# Patient Record
Sex: FEMALE | Race: WHITE | NOT HISPANIC OR LATINO | Employment: FULL TIME | URBAN - METROPOLITAN AREA
[De-identification: names, ages, dates, MRNs, and addresses within clinical notes are randomized per-mention and may not be internally consistent; named-entity substitution may affect disease eponyms.]

---

## 2023-05-16 ENCOUNTER — TRANSFERRED RECORDS (OUTPATIENT)
Dept: HEALTH INFORMATION MANAGEMENT | Facility: CLINIC | Age: 38
End: 2023-05-16
Payer: OTHER GOVERNMENT

## 2023-05-18 ENCOUNTER — MEDICAL CORRESPONDENCE (OUTPATIENT)
Dept: HEALTH INFORMATION MANAGEMENT | Facility: CLINIC | Age: 38
End: 2023-05-18
Payer: OTHER GOVERNMENT

## 2023-05-22 ENCOUNTER — TRANSCRIBE ORDERS (OUTPATIENT)
Dept: OTHER | Age: 38
End: 2023-05-22

## 2023-05-22 DIAGNOSIS — C02.9 MALIGNANT NEOPLASM OF TONGUE (H): Primary | ICD-10-CM

## 2023-05-22 NOTE — TELEPHONE ENCOUNTER
FUTURE VISIT INFORMATION:      FUTURE VISIT INFORMATION:    Date: 5/31/23    Time: 3 PM    [place] [facility] [place] INFORMATION:    Referring provider: Dr. 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 Ruby Huggins at [facility]    RECORDS REQUESTED FROM:       Clinic name Comments Records Status Imaging Status   [facility] *req for recs 5/22/23 5/16/23 OV with -  Dr. Ruby Huggins Pending    Send to scan 5/22    [facility]  MRN: [id number] 5/12/23 + 5/5/23 OV - Niall Montilla-Al Westfall MD  CE    [facility]  [address] [place], [place] [address]  [place] [address]  Phone [phone]  Fax [phone] 5/12/23 Case: [id number] Specimen: Tongue Bx    5/5/23 Case: [id number] Specimen: Tongue Bx    fedex track: [id number] CE    Path   req 5/22                         May 22, 2023 at 2:13 PM - STAT req for recs @ [facility] and send req to [facility] for path [facility]  * UPDATE 6 PM - Received OV note from [facility] and send to scanning -[place]

## 2023-05-23 DIAGNOSIS — C02.9 PRIMARY SQUAMOUS CELL CARCINOMA OF TONGUE (H): Primary | ICD-10-CM

## 2023-05-24 ENCOUNTER — ANCILLARY PROCEDURE (OUTPATIENT)
Dept: CT IMAGING | Facility: CLINIC | Age: 38
End: 2023-05-24
Attending: OTOLARYNGOLOGY
Payer: OTHER GOVERNMENT

## 2023-05-24 DIAGNOSIS — C02.9 PRIMARY SQUAMOUS CELL CARCINOMA OF TONGUE (H): ICD-10-CM

## 2023-05-24 PROCEDURE — 70491 CT SOFT TISSUE NECK W/DYE: CPT | Mod: TC | Performed by: RADIOLOGY

## 2023-05-24 RX ORDER — IOPAMIDOL 755 MG/ML
500 INJECTION, SOLUTION INTRAVASCULAR ONCE
Status: COMPLETED | OUTPATIENT
Start: 2023-05-24 | End: 2023-05-24

## 2023-05-24 RX ADMIN — IOPAMIDOL 100 ML: 755 INJECTION, SOLUTION INTRAVASCULAR at 15:04

## 2023-05-24 RX ADMIN — Medication 50 ML: at 15:04

## 2023-05-24 NOTE — TELEPHONE ENCOUNTER
Action May 24, 2023 3:23 PM Deepa Mason Taken Slides from Briseida received and taken to 5th floor path lab for review.

## 2023-05-25 ENCOUNTER — LAB REQUISITION (OUTPATIENT)
Dept: LAB | Facility: CLINIC | Age: 38
End: 2023-05-25
Payer: OTHER GOVERNMENT

## 2023-05-25 PROCEDURE — 88321 CONSLTJ&REPRT SLD PREP ELSWR: CPT | Performed by: STUDENT IN AN ORGANIZED HEALTH CARE EDUCATION/TRAINING PROGRAM

## 2023-05-30 LAB
PATH REPORT.COMMENTS IMP SPEC: ABNORMAL
PATH REPORT.COMMENTS IMP SPEC: ABNORMAL
PATH REPORT.COMMENTS IMP SPEC: YES
PATH REPORT.FINAL DX SPEC: ABNORMAL
PATH REPORT.GROSS SPEC: ABNORMAL
PATH REPORT.MICROSCOPIC SPEC OTHER STN: ABNORMAL
PATH REPORT.RELEVANT HX SPEC: ABNORMAL
PATH REPORT.RELEVANT HX SPEC: ABNORMAL
PATH REPORT.SITE OF ORIGIN SPEC: ABNORMAL

## 2023-05-31 ENCOUNTER — PRE VISIT (OUTPATIENT)
Dept: OTOLARYNGOLOGY | Facility: CLINIC | Age: 38
End: 2023-05-31

## 2023-05-31 ENCOUNTER — THERAPY VISIT (OUTPATIENT)
Dept: SPEECH THERAPY | Facility: CLINIC | Age: 38
End: 2023-05-31
Payer: OTHER GOVERNMENT

## 2023-05-31 ENCOUNTER — PREP FOR PROCEDURE (OUTPATIENT)
Dept: OTOLARYNGOLOGY | Facility: CLINIC | Age: 38
End: 2023-05-31

## 2023-05-31 ENCOUNTER — OFFICE VISIT (OUTPATIENT)
Dept: OTOLARYNGOLOGY | Facility: CLINIC | Age: 38
End: 2023-05-31
Payer: OTHER GOVERNMENT

## 2023-05-31 VITALS
HEART RATE: 52 BPM | OXYGEN SATURATION: 98 % | DIASTOLIC BLOOD PRESSURE: 71 MMHG | TEMPERATURE: 97.5 F | WEIGHT: 229 LBS | SYSTOLIC BLOOD PRESSURE: 128 MMHG

## 2023-05-31 DIAGNOSIS — C02.9 SQUAMOUS CELL CANCER OF TONGUE (H): Primary | ICD-10-CM

## 2023-05-31 DIAGNOSIS — C02.9 PRIMARY SQUAMOUS CELL CARCINOMA OF TONGUE (H): Primary | ICD-10-CM

## 2023-05-31 DIAGNOSIS — R13.11 ORAL PHASE DYSPHAGIA: Primary | ICD-10-CM

## 2023-05-31 PROCEDURE — 99204 OFFICE O/P NEW MOD 45 MIN: CPT | Mod: GC | Performed by: OTOLARYNGOLOGY

## 2023-05-31 PROCEDURE — 99207 PR NO CHARGE LOS: CPT | Performed by: SPEECH-LANGUAGE PATHOLOGIST

## 2023-05-31 RX ORDER — FAMOTIDINE 20 MG/1
TABLET, FILM COATED ORAL
COMMUNITY
Start: 2023-02-20 | End: 2024-08-21

## 2023-05-31 ASSESSMENT — PAIN SCALES - GENERAL: PAINLEVEL: MILD PAIN (2)

## 2023-05-31 NOTE — PROGRESS NOTES
Kayagerard Martolabi was seen for allied health care provider visit for introduction of self and SLP services. Pt with lingual tongue SCC and will undergo partial glossectomy. Provided information on trajectory of care and benefits of SLP services after surgery once medically cleared to participate in PO trials. Formal swallow evaluation indicated post once once medically cleared. All questions answered within scope of practice for pt. Encouraged pt to reach out with any questions or concerns. Time spent with patient 5 minutes.       MALLIKA Brasher MA (music), CCC-SLP   Speech Language Pathologist  XAVIER Trained Vocologist   [facility]  Dept. of Otolaryngology  Department of Rehabilitation Services  [address] [place] [id number]  Email: [email or web address]   Phone: [phone]  Pronouns: she/her/hers

## 2023-05-31 NOTE — NURSING NOTE
Teaching Flowsheet - ENT   Relevant Diagnosis: scc tongue  Teaching Topic: partial glossectomy, neck dissection   Person(s) involved in teaching: patient        Motivation Level:  Asks Questions:   Yes  Eager to Learn:   Yes  Cooperative:   Yes  Receptive (willing/able to accept information):   Yes  Comments: Reviewed pre-op H and P,  NPO prior to  surgery,  pre-op scrub (given Hibiclens)  Reviewed post-op  cares , activity and pain.     Patient demonstrates understanding of the following:  Reason for the appointment, diagnosis and treatment plan:   Yes  Knowledge of proper use of medications and conditions for which they are ordered (with special attention to potential side effects or drug interactions):  stop aspirin products 1 week before surgery Yes  Which situations necessitate calling provider and whom to contact:   Yes  Nutritional needs and diet plan:   Yes  Pain management techniques:   Yes  Patient instructed on hand hygiene:  Yes  How and/when to access community resources:   Yes     Infection Prevention:  Patient   demonstrates understanding of the following:  Surgical procedure site care taught Yes  Signs and symptoms of infection taught Yes  Wound care taught Yes  Instructional Materials Used/Given: pre- op booklet,verbal  Instruction.    Shivani Montalvo, RN, BSN

## 2023-05-31 NOTE — LETTER
2023       RE: Kaya Wagner  [address] [facility] [address]     Dear Colleague,    Thank you for referring your patient, Kaya Wagner, to the [facility] EAR NOSE AND [facility]ROAT CLINIC [place] at [facility]. Please see a copy of my visit note below.    Dear Dr. Huggins:    I had the pleasure of meeting Kaya Wagner in consultation today at the [facility]roat [facility] at your request.     History of Present Illness:   Ms. Wagner is a 37-year-old female who presents for evaluation of a right tongue squamous cell carcinoma.  She first noticed a sore on her right lateral tongue when she was 3 months pregnant, approximately 6 months ago.  She was observed and this lesion was monitored for her pregnancy however it did not resolve.  She notes that it has not grown much however she is noticed some pain that is bothersome at times.  She was then referred to [facility] who performed 2 biopsies of the lesion the initial being fibroma and the second being positive for invasive squamous cell carcinoma.  She was referred to Dr. Huggins however due to insurance limitations she was referred to the [place].  She has not noticed any neck lumps or bumps or any tongue movement abnormalities.  She does feel her speech is somewhat affected due to pain in her tongue.  Of note she has had numerous canker sores in this area in the past however she has had no other symptoms.  She is a non-smoker rarely drinks alcohol.    PMH: Prediabetes, depression, anxiety, PCOS  PSH:  section  Family history: No family history of head or neck cancer    MEDICATIONS:     Current Outpatient Medications   Medication Sig Dispense Refill    famotidine (PEPCID) 20 MG tablet       metFORMIN (GLUCOPHAGE) 1000 MG tablet       sertraline (ZOLOFT) 50 MG tablet Take 50 mg by mouth daily         ALLERGIES:  No Known Allergies    HABITS/SOCIAL HISTORY:  Non-smoker    PAST MEDICAL HISTORY: No past medical history on file.     FAMILY HISTORY:  No family history on file.    REVIEW OF SYSTEMS:  12 point ROS was negative other than the symptoms noted above in the HPI.    PHYSICAL EXAMINATION:   General: laying in bed, no acute distress  /71   Pulse 52   Temp 97.5  F (36.4  C)   Wt 103.9 kg (229 lb)   SpO2 98%   HEAD: normocephalic, atraumatic  Face: symmetrical, no swelling, edema, or erythema, no facial droop  Eyes: eomi, perrl, clear sclera  Ears: no tragal tenderness, external ear canal open and clear bilaterally  Nose: no anterior drainage, intact and midline septum without perforation or hematoma   Mouth: moist, right posterior lateral tongue with roughly 1.5 cm shallow ulceration that is tender to palpation, no jaw or tooth tenderness, tongue midline and symmetric.  This does not appear to extend into the posterior oropharynx.  Oropharynx: tonsils within normal limits, uvula midline, no oropharyngeal erythema  Neck: no LAD, trach midline  Neuro: cranial nerves 2-12 grossly intact    CT neck:FINDINGS: Evaluation of the oral cavity and floor of mouth is limited  by metallic artifact from the patient's dental amalgam. There is a  questionable soft tissue density mass obscuring the right  glossotonsillar sulcus measuring roughly 1.9 x 2.0 cm in size that  could represent the tongue lesion in question. Biopsy note describes a  lesion of the right posterolateral tongue.     There are no definite enlarged lymph nodes in the neck on either side.  Scattered mildly prominent suprahyoid lymph nodes on both sides of the  neck are noted but are morphologically within normal limits in  contour.     No evidence for mucosal space lesion other than the aforementioned  right posterolateral tongue lesion. The salivary glands are  unremarkable. The thyroid gland is within normal limits. The lung  apices are clear. No sinusitis or mastoiditis.                                                                       IMPRESSION:  1. Abnormally prominent soft tissue obscuring the glossotonsillar  sulcus on the right measuring roughly 1.9 x 2.0 cm in size. This may  correspond to the biopsied tongue lesion.  2. No other mucosal space abnormalities.  3. No definite cervical lymphadenopathy.  4. Otherwise, normal soft tissue neck CT.         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  We discussed surgical management of this which would involve a partial glossectomy as well as likely management of the lymph nodes involving a right neck dissection.  She notes that she has a tattoo on her right lateral neck that is very important to her that she would like to avoid if possible in the surgery.  As this is a rather shallow lesion she will not require free flap reconstruction.  We will also place a temporary NG tube during surgery.  She agrees with this plan.  She also have a PET scan and tumor board presentation prior to her surgery.    The patient was seen and discussed with Dr. London    Thank you very much for the opportunity to participate in the care of your patient.      Chani Reynoso MD  Otolaryngology- Head & Neck Surgery      I, Jabier London MD, saw this patient with the resident/fellow and agree with the resident's findings and plan of care as documented in the resident's/fellow's note. I was present with the patient for the entire viewing portion of the endoscopy procedure (including scope insertion and withdrawal) and agree with the interpretation and report as documented by the resident.        Again, thank you for allowing me to participate in the care of your patient.      Sincerely,    Jabier London MD

## 2023-05-31 NOTE — PATIENT INSTRUCTIONS
1. We will call you to schedule date for surgery   2. Please schedule a pre-op physical with their primary MD within 30 days of the procedure.   3. Please avoid blood thinning medications 1 week prior to surgery (Ibuprofen, Aleve, Aspirin, etc.)   4. Please review contents within the surgical packet & use the antiseptic scrub as directed.   5. Please call FROYLAN Parrish with any further questions [phone]

## 2023-05-31 NOTE — LETTER
May 31, 2023      Kaya Wagner  [address] [facility] [address]        To Whom It May Concern:    Kaya Wagner was seen in our clinic today. She will be scheduled for surgery within the next 30 days and will require assistance and care from her  following surgery.     Please do not hesitate to contact our clinic for additional information regarding this request or if additional paperwork is requested.       Sincerely,        Jabier London MD

## 2023-05-31 NOTE — PROGRESS NOTES
Dear Dr. Huggins:    I had the pleasure of meeting Kaya Wagner in consultation today at the [facility] [place] at your request.     History of Present Illness:   Ms. Wagner is a 37-year-old female who presents for evaluation of a right tongue squamous cell carcinoma.  She first noticed a sore on her right lateral tongue when she was 3 months pregnant, approximately 6 months ago.  She was observed and this lesion was monitored for her pregnancy however it did not resolve.  She notes that it has not grown much however she is noticed some pain that is bothersome at times.  She was then referred to [facility] who performed 2 biopsies of the lesion the initial being fibroma and the second being positive for invasive squamous cell carcinoma.  She was referred to Dr. Huggins however due to insurance limitations she was referred to the [place].  She has not noticed any neck lumps or bumps or any tongue movement abnormalities.  She does feel her speech is somewhat affected due to pain in her tongue.  Of note she has had numerous canker sores in this area in the past however she has had no other symptoms.  She is a non-smoker rarely drinks alcohol.    PMH: Prediabetes, depression, anxiety, PCOS  PSH:  section  Family history: No family history of head or neck cancer    MEDICATIONS:     Current Outpatient Medications   Medication Sig Dispense Refill     famotidine (PEPCID) 20 MG tablet        metFORMIN (GLUCOPHAGE) 1000 MG tablet        sertraline (ZOLOFT) 50 MG tablet Take 50 mg by mouth daily         ALLERGIES:  No Known Allergies    HABITS/SOCIAL HISTORY: Non-smoker    PAST MEDICAL HISTORY: No past medical history on file.     FAMILY HISTORY:  No family history on file.    REVIEW OF SYSTEMS:  12 point ROS was negative other than the symptoms noted above in the HPI.    PHYSICAL EXAMINATION:   General: laying in bed, no acute distress  /71   Pulse 52   Temp 97.5  F  (36.4  C)   Wt 103.9 kg (229 lb)   SpO2 98%   HEAD: normocephalic, atraumatic  Face: symmetrical, no swelling, edema, or erythema, no facial droop  Eyes: eomi, perrl, clear sclera  Ears: no tragal tenderness, external ear canal open and clear bilaterally  Nose: no anterior drainage, intact and midline septum without perforation or hematoma   Mouth: moist, right posterior lateral tongue with roughly 1.5 cm shallow ulceration that is tender to palpation, no jaw or tooth tenderness, tongue midline and symmetric.  This does not appear to extend into the posterior oropharynx.  Oropharynx: tonsils within normal limits, uvula midline, no oropharyngeal erythema  Neck: no LAD, trach midline  Neuro: cranial nerves 2-12 grossly intact    CT neck:FINDINGS: Evaluation of the oral cavity and floor of mouth is limited  by metallic artifact from the patient's dental amalgam. There is a  questionable soft tissue density mass obscuring the right  glossotonsillar sulcus measuring roughly 1.9 x 2.0 cm in size that  could represent the tongue lesion in question. Biopsy note describes a  lesion of the right posterolateral tongue.     There are no definite enlarged lymph nodes in the neck on either side.  Scattered mildly prominent suprahyoid lymph nodes on both sides of the  neck are noted but are morphologically within normal limits in  contour.     No evidence for mucosal space lesion other than the aforementioned  right posterolateral tongue lesion. The salivary glands are  unremarkable. The thyroid gland is within normal limits. The lung  apices are clear. No sinusitis or mastoiditis.                                                                      IMPRESSION:  1. Abnormally prominent soft tissue obscuring the glossotonsillar  sulcus on the right measuring roughly 1.9 x 2.0 cm in size. This may  correspond to the biopsied tongue lesion.  2. No other mucosal space abnormalities.  3. No definite cervical lymphadenopathy.  4.  Otherwise, normal soft tissue neck CT.         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  We discussed surgical management of this which would involve a partial glossectomy as well as likely management of the lymph nodes involving a right neck dissection.  She notes that she has a tattoo on her right lateral neck that is very important to her that she would like to avoid if possible in the surgery.  As this is a rather shallow lesion she will not require free flap reconstruction.  We will also place a temporary NG tube during surgery.  She agrees with this plan.  She also have a PET scan and tumor board presentation prior to her surgery.    The patient was seen and discussed with Dr. London    Thank you very much for the opportunity to participate in the care of your patient.      Chani Reynoso MD  Otolaryngology- Head & Neck Surgery      IJabier MD, saw this patient with the resident/fellow and agree with the resident's findings and plan of care as documented in the resident's/fellow's note. I was present with the patient for the entire viewing portion of the endoscopy procedure (including scope insertion and withdrawal) and agree with the interpretation and report as documented by the resident.

## 2023-06-01 ENCOUNTER — TELEPHONE (OUTPATIENT)
Dept: OTOLARYNGOLOGY | Facility: CLINIC | Age: 38
End: 2023-06-01
Payer: OTHER GOVERNMENT

## 2023-06-01 NOTE — TELEPHONE ENCOUNTER
Called patient to schedule surgery with Dr. London    Date of Surgery: 6/8    Location of surgery: [place] [facility]    Pre-Op H&P: PCP Storm Fitzpatrick MD    Pre/Post Imaging:  Not Applicable    Post-Op Appt Date: 1 week    Surgery Packet Mailed: chantel    Additional comments: MERNA Garcia on 6/1/2023 at 1:36 PM

## 2023-06-02 ENCOUNTER — TUMOR CONFERENCE (OUTPATIENT)
Dept: ONCOLOGY | Facility: CLINIC | Age: 38
End: 2023-06-02
Payer: OTHER GOVERNMENT

## 2023-06-02 NOTE — TUMOR CONFERENCE
Head & Neck Tumor Conference Note        Status: New  Staff: Dr. London    Tumor Site: Right lateral tongue   Tumor Pathology: SCC   Tumor Stage: T1 v T2 N0  Tumor Treatment:  n/a    Reason for Review: Review imaging, path, and POC    Brief History: Ms. Wagner is a 37-year-old female who presents for evaluation of a right tongue squamous cell carcinoma.  She first noticed a sore on her right lateral tongue when she was 3 months pregnant, approximately 6 months ago.  She was observed and this lesion was monitored for her pregnancy however it did not resolve.  She notes that it has not grown much however she is noticed some pain that is bothersome at times.  She was then referred to [facility] who performed 2 biopsies of the lesion the initial being fibroma and the second being positive for invasive squamous cell carcinoma.  She was referred to Dr. Huggins however due to insurance limitations she was referred to the [place].  She has not noticed any neck lumps or bumps or any tongue movement abnormalities.  She does feel her speech is somewhat affected due to pain in her tongue.  Of note she has had numerous canker sores in this area in the past however she has had no other symptoms.  She is a non-smoker rarely drinks alcohol.     PMH: Prediabetes, depression, anxiety, PCOS  PSH:  section  Family history: No family history of head or neck cancer    Pertinent PMH: No past medical history on file.     Smoking Hx:   Social History     Tobacco Use     Smoking status: Never     Smokeless tobacco: Never     Imaging:   CT Neck   FINDINGS: Evaluation of the oral cavity and floor of mouth is limited  by metallic artifact from the patient's dental amalgam. There is a  questionable soft tissue density mass obscuring the right  glossotonsillar sulcus measuring roughly 1.9 x 2.0 cm in size that  could represent the tongue lesion in question. Biopsy note describes a  lesion of the right posterolateral  tongue.     There are no definite enlarged lymph nodes in the neck on either side.  Scattered mildly prominent suprahyoid lymph nodes on both sides of the  neck are noted but are morphologically within normal limits in  contour.     No evidence for mucosal space lesion other than the aforementioned  right posterolateral tongue lesion. The salivary glands are  unremarkable. The thyroid gland is within normal limits. The lung  apices are clear. No sinusitis or mastoiditis.                                                                      IMPRESSION:  1. Abnormally prominent soft tissue obscuring the glossotonsillar  sulcus on the right measuring roughly 1.9 x 2.0 cm in size. This may  correspond to the biopsied tongue lesion.  2. No other mucosal space abnormalities.  3. No definite cervical lymphadenopathy.  4. Otherwise, normal soft tissue neck CT.        Radiation dose for this scan was reduced using automated exposure  control, adjustment of the mA and/or kV according to patient size, or  iterative reconstruction technique.     DEVIN MORGAN MD     Pathology:   ***    Tumor Board Recommendation:   Discussion: ***    Plan:   PET Scan  Partial glossectomy, R MRND     Chani Reynoso MD PGY-3  Otolaryngology- Head and Neck Surgery    Documentation / Disclaimer Cancer Tumor Board Note  Cancer tumor board recommendations do not override what is determined to be reasonable care and treatment, which is dependent on the circumstances of a patient's case; the patient's medical, social, and personal concerns; and the clinical judgment of the oncologist [physician].

## 2023-06-02 NOTE — TUMOR CONFERENCE
Called and spoke with patient. She will proceed with PET scan as scheduled on Monday and surgery as scheduled on 6/8 with Dr. London. All questions answered and patient encouraged to call with further questions or concerns.       Shivani Montalvo, RN, BSN

## 2023-06-04 ENCOUNTER — HEALTH MAINTENANCE LETTER (OUTPATIENT)
Age: 38
End: 2023-06-04

## 2023-06-05 ENCOUNTER — HOSPITAL ENCOUNTER (OUTPATIENT)
Dept: PET IMAGING | Facility: CLINIC | Age: 38
Discharge: HOME | End: 2023-06-05
Attending: OTOLARYNGOLOGY | Admitting: OTOLARYNGOLOGY
Payer: OTHER GOVERNMENT

## 2023-06-05 DIAGNOSIS — C02.9 PRIMARY SQUAMOUS CELL CARCINOMA OF TONGUE (H): ICD-10-CM

## 2023-06-05 PROCEDURE — 74177 CT ABD & PELVIS W/CONTRAST: CPT | Mod: 26 | Performed by: RADIOLOGY

## 2023-06-05 PROCEDURE — 71260 CT THORAX DX C+: CPT | Mod: 26 | Performed by: RADIOLOGY

## 2023-06-05 PROCEDURE — 78815 PET IMAGE W/CT SKULL-THIGH: CPT | Mod: PI

## 2023-06-05 PROCEDURE — 74177 CT ABD & PELVIS W/CONTRAST: CPT

## 2023-06-05 PROCEDURE — 250N000011 HC RX IP 250 OP 636: Performed by: OTOLARYNGOLOGY

## 2023-06-05 PROCEDURE — 78815 PET IMAGE W/CT SKULL-THIGH: CPT | Mod: 26 | Performed by: RADIOLOGY

## 2023-06-05 PROCEDURE — 343N000001 HC RX 343: Performed by: OTOLARYNGOLOGY

## 2023-06-05 PROCEDURE — A9552 F18 FDG: HCPCS | Performed by: OTOLARYNGOLOGY

## 2023-06-05 RX ORDER — IOPAMIDOL 755 MG/ML
10-135 INJECTION, SOLUTION INTRAVASCULAR ONCE
Status: COMPLETED | OUTPATIENT
Start: 2023-06-05 | End: 2023-06-05

## 2023-06-05 RX ADMIN — IOPAMIDOL 121 ML: 755 INJECTION, SOLUTION INTRAVENOUS at 14:54

## 2023-06-05 RX ADMIN — FLUDEOXYGLUCOSE F-18 13.69 MILLICURIE: 500 INJECTION, SOLUTION INTRAVENOUS at 13:54

## 2023-06-07 ENCOUNTER — ANESTHESIA EVENT (OUTPATIENT)
Dept: SURGERY | Facility: CLINIC | Age: 38
DRG: 141 | End: 2023-06-07
Payer: OTHER GOVERNMENT

## 2023-06-07 ENCOUNTER — PATIENT OUTREACH (OUTPATIENT)
Dept: OTOLARYNGOLOGY | Facility: CLINIC | Age: 38
End: 2023-06-07
Payer: OTHER GOVERNMENT

## 2023-06-07 NOTE — TELEPHONE ENCOUNTER
Called and spoke with patient regarding the following PET scan results:    IMPRESSION:   1. Hypermetabolic lesion within the right posterior lateral tongue  extending to retromolar trigone, biopsy-proven squamous cell  carcinoma.     2. No cervical lymphadenopathy.     3. No evidence for distant metastatic disease.      Reviewed with patient no evidence of any metastatic disease on PET scan and plan to proceed as scheduled tomorrow with surgery ( right partial glossectomy and neck dissection). All questions answered and patient encouraged to call with further questions or concerns.       Shivani Montalvo, RN, BSN

## 2023-06-08 ENCOUNTER — APPOINTMENT (OUTPATIENT)
Dept: GENERAL RADIOLOGY | Facility: CLINIC | Age: 38
DRG: 141 | End: 2023-06-08
Attending: OTOLARYNGOLOGY
Payer: OTHER GOVERNMENT

## 2023-06-08 ENCOUNTER — ANESTHESIA (OUTPATIENT)
Dept: SURGERY | Facility: CLINIC | Age: 38
DRG: 141 | End: 2023-06-08
Payer: OTHER GOVERNMENT

## 2023-06-08 ENCOUNTER — HOSPITAL ENCOUNTER (INPATIENT)
Facility: CLINIC | Age: 38
LOS: 2 days | Discharge: HOME | DRG: 141 | End: 2023-06-10
Attending: OTOLARYNGOLOGY | Admitting: OTOLARYNGOLOGY
Payer: OTHER GOVERNMENT

## 2023-06-08 DIAGNOSIS — C06.9 SQUAMOUS CELL CARCINOMA OF ORAL CAVITY (H): Primary | ICD-10-CM

## 2023-06-08 LAB
ABO/RH(D): NORMAL
ANTIBODY SCREEN: NEGATIVE
GLUCOSE BLDC GLUCOMTR-MCNC: 114 MG/DL (ref 70–99)
GLUCOSE BLDC GLUCOMTR-MCNC: 134 MG/DL (ref 70–99)
GLUCOSE BLDC GLUCOMTR-MCNC: 140 MG/DL (ref 70–99)
SARS-COV-2 RNA RESP QL NAA+PROBE: NEGATIVE
SPECIMEN EXPIRATION DATE: NORMAL

## 2023-06-08 PROCEDURE — 250N000011 HC RX IP 250 OP 636: Performed by: STUDENT IN AN ORGANIZED HEALTH CARE EDUCATION/TRAINING PROGRAM

## 2023-06-08 PROCEDURE — 258N000003 HC RX IP 258 OP 636

## 2023-06-08 PROCEDURE — 120N000002 HC R&B MED SURG/OB UMMC

## 2023-06-08 PROCEDURE — 74018 RADEX ABDOMEN 1 VIEW: CPT | Mod: 26 | Performed by: RADIOLOGY

## 2023-06-08 PROCEDURE — 258N000003 HC RX IP 258 OP 636: Performed by: ANESTHESIOLOGY

## 2023-06-08 PROCEDURE — 250N000009 HC RX 250: Performed by: OTOLARYNGOLOGY

## 2023-06-08 PROCEDURE — 87635 SARS-COV-2 COVID-19 AMP PRB: CPT | Performed by: STUDENT IN AN ORGANIZED HEALTH CARE EDUCATION/TRAINING PROGRAM

## 2023-06-08 PROCEDURE — 999N000065 XR ABDOMEN PORT 1 VIEW

## 2023-06-08 PROCEDURE — 88332 PATH CONSLTJ SURG EA ADD BLK: CPT | Mod: 26 | Performed by: STUDENT IN AN ORGANIZED HEALTH CARE EDUCATION/TRAINING PROGRAM

## 2023-06-08 PROCEDURE — 360N000077 HC SURGERY LEVEL 4, PER MIN: Performed by: OTOLARYNGOLOGY

## 2023-06-08 PROCEDURE — 250N000011 HC RX IP 250 OP 636: Performed by: PHYSICIAN ASSISTANT

## 2023-06-08 PROCEDURE — 258N000003 HC RX IP 258 OP 636: Performed by: OTOLARYNGOLOGY

## 2023-06-08 PROCEDURE — 272N000001 HC OR GENERAL SUPPLY STERILE: Performed by: OTOLARYNGOLOGY

## 2023-06-08 PROCEDURE — 250N000013 HC RX MED GY IP 250 OP 250 PS 637: Performed by: OTOLARYNGOLOGY

## 2023-06-08 PROCEDURE — 258N000003 HC RX IP 258 OP 636: Performed by: STUDENT IN AN ORGANIZED HEALTH CARE EDUCATION/TRAINING PROGRAM

## 2023-06-08 PROCEDURE — 250N000011 HC RX IP 250 OP 636

## 2023-06-08 PROCEDURE — 250N000013 HC RX MED GY IP 250 OP 250 PS 637

## 2023-06-08 PROCEDURE — 250N000013 HC RX MED GY IP 250 OP 250 PS 637: Performed by: STUDENT IN AN ORGANIZED HEALTH CARE EDUCATION/TRAINING PROGRAM

## 2023-06-08 PROCEDURE — 250N000009 HC RX 250: Performed by: ANESTHESIOLOGY

## 2023-06-08 PROCEDURE — 370N000017 HC ANESTHESIA TECHNICAL FEE, PER MIN: Performed by: OTOLARYNGOLOGY

## 2023-06-08 PROCEDURE — 250N000011 HC RX IP 250 OP 636: Performed by: OTOLARYNGOLOGY

## 2023-06-08 PROCEDURE — 0CB7XZZ EXCISION OF TONGUE, EXTERNAL APPROACH: ICD-10-PCS | Performed by: OTOLARYNGOLOGY

## 2023-06-08 PROCEDURE — 88305 TISSUE EXAM BY PATHOLOGIST: CPT | Mod: 26 | Performed by: STUDENT IN AN ORGANIZED HEALTH CARE EDUCATION/TRAINING PROGRAM

## 2023-06-08 PROCEDURE — 38724 REMOVAL OF LYMPH NODES NECK: CPT | Mod: RT | Performed by: OTOLARYNGOLOGY

## 2023-06-08 PROCEDURE — 07T10ZZ RESECTION OF RIGHT NECK LYMPHATIC, OPEN APPROACH: ICD-10-PCS | Performed by: OTOLARYNGOLOGY

## 2023-06-08 PROCEDURE — 88331 PATH CONSLTJ SURG 1 BLK 1SPC: CPT | Mod: TC | Performed by: OTOLARYNGOLOGY

## 2023-06-08 PROCEDURE — 250N000011 HC RX IP 250 OP 636: Performed by: ANESTHESIOLOGY

## 2023-06-08 PROCEDURE — 999N000141 HC STATISTIC PRE-PROCEDURE NURSING ASSESSMENT: Performed by: OTOLARYNGOLOGY

## 2023-06-08 PROCEDURE — 41120 PARTIAL REMOVAL OF TONGUE: CPT | Mod: 51 | Performed by: OTOLARYNGOLOGY

## 2023-06-08 PROCEDURE — 250N000025 HC SEVOFLURANE, PER MIN: Performed by: OTOLARYNGOLOGY

## 2023-06-08 PROCEDURE — 88307 TISSUE EXAM BY PATHOLOGIST: CPT | Mod: 26 | Performed by: STUDENT IN AN ORGANIZED HEALTH CARE EDUCATION/TRAINING PROGRAM

## 2023-06-08 PROCEDURE — 88331 PATH CONSLTJ SURG 1 BLK 1SPC: CPT | Mod: 26 | Performed by: STUDENT IN AN ORGANIZED HEALTH CARE EDUCATION/TRAINING PROGRAM

## 2023-06-08 PROCEDURE — 710N000010 HC RECOVERY PHASE 1, LEVEL 2, PER MIN: Performed by: OTOLARYNGOLOGY

## 2023-06-08 PROCEDURE — 88305 TISSUE EXAM BY PATHOLOGIST: CPT | Mod: TC | Performed by: OTOLARYNGOLOGY

## 2023-06-08 PROCEDURE — 86901 BLOOD TYPING SEROLOGIC RH(D): CPT | Performed by: ANESTHESIOLOGY

## 2023-06-08 PROCEDURE — 88309 TISSUE EXAM BY PATHOLOGIST: CPT | Mod: 26 | Performed by: STUDENT IN AN ORGANIZED HEALTH CARE EDUCATION/TRAINING PROGRAM

## 2023-06-08 RX ORDER — HYDROMORPHONE HYDROCHLORIDE 4 MG/ML
INJECTION, SOLUTION INTRAMUSCULAR; INTRAVENOUS; SUBCUTANEOUS PRN
Status: DISCONTINUED | OUTPATIENT
Start: 2023-06-08 | End: 2023-06-08

## 2023-06-08 RX ORDER — DEXAMETHASONE SODIUM PHOSPHATE 4 MG/ML
10 INJECTION, SOLUTION INTRA-ARTICULAR; INTRALESIONAL; INTRAMUSCULAR; INTRAVENOUS; SOFT TISSUE ONCE
Status: CANCELLED | OUTPATIENT
Start: 2023-06-08

## 2023-06-08 RX ORDER — CHLORHEXIDINE GLUCONATE ORAL RINSE 1.2 MG/ML
15 SOLUTION DENTAL 3 TIMES DAILY
Status: DISCONTINUED | OUTPATIENT
Start: 2023-06-08 | End: 2023-06-10 | Stop reason: HOSPADM

## 2023-06-08 RX ORDER — NALOXONE HYDROCHLORIDE 0.4 MG/ML
0.4 INJECTION, SOLUTION INTRAMUSCULAR; INTRAVENOUS; SUBCUTANEOUS
Status: DISCONTINUED | OUTPATIENT
Start: 2023-06-08 | End: 2023-06-10 | Stop reason: HOSPADM

## 2023-06-08 RX ORDER — ONDANSETRON 2 MG/ML
4 INJECTION INTRAMUSCULAR; INTRAVENOUS EVERY 6 HOURS PRN
Status: DISCONTINUED | OUTPATIENT
Start: 2023-06-08 | End: 2023-06-10 | Stop reason: HOSPADM

## 2023-06-08 RX ORDER — PROPOFOL 10 MG/ML
INJECTION, EMULSION INTRAVENOUS PRN
Status: DISCONTINUED | OUTPATIENT
Start: 2023-06-08 | End: 2023-06-08

## 2023-06-08 RX ORDER — ONDANSETRON 2 MG/ML
INJECTION INTRAMUSCULAR; INTRAVENOUS PRN
Status: DISCONTINUED | OUTPATIENT
Start: 2023-06-08 | End: 2023-06-08

## 2023-06-08 RX ORDER — ONDANSETRON 2 MG/ML
4 INJECTION INTRAMUSCULAR; INTRAVENOUS EVERY 30 MIN PRN
Status: CANCELLED | OUTPATIENT
Start: 2023-06-08

## 2023-06-08 RX ORDER — OXYCODONE HYDROCHLORIDE 5 MG/1
5 TABLET ORAL EVERY 4 HOURS PRN
Status: DISCONTINUED | OUTPATIENT
Start: 2023-06-08 | End: 2023-06-10 | Stop reason: HOSPADM

## 2023-06-08 RX ORDER — DEXAMETHASONE SODIUM PHOSPHATE 4 MG/ML
INJECTION, SOLUTION INTRA-ARTICULAR; INTRALESIONAL; INTRAMUSCULAR; INTRAVENOUS; SOFT TISSUE PRN
Status: DISCONTINUED | OUTPATIENT
Start: 2023-06-08 | End: 2023-06-08

## 2023-06-08 RX ORDER — OXYCODONE HYDROCHLORIDE 10 MG/1
10 TABLET ORAL EVERY 4 HOURS PRN
Status: DISCONTINUED | OUTPATIENT
Start: 2023-06-08 | End: 2023-06-10 | Stop reason: HOSPADM

## 2023-06-08 RX ORDER — PROCHLORPERAZINE MALEATE 10 MG
10 TABLET ORAL EVERY 6 HOURS PRN
Status: DISCONTINUED | OUTPATIENT
Start: 2023-06-08 | End: 2023-06-10 | Stop reason: HOSPADM

## 2023-06-08 RX ORDER — POLYETHYLENE GLYCOL 3350 17 G/17G
17 POWDER, FOR SOLUTION ORAL DAILY
Status: DISCONTINUED | OUTPATIENT
Start: 2023-06-09 | End: 2023-06-10 | Stop reason: HOSPADM

## 2023-06-08 RX ORDER — ACETAMINOPHEN 325 MG/1
975 TABLET ORAL EVERY 8 HOURS
Status: DISCONTINUED | OUTPATIENT
Start: 2023-06-08 | End: 2023-06-10 | Stop reason: HOSPADM

## 2023-06-08 RX ORDER — SODIUM CHLORIDE, SODIUM LACTATE, POTASSIUM CHLORIDE, CALCIUM CHLORIDE 600; 310; 30; 20 MG/100ML; MG/100ML; MG/100ML; MG/100ML
INJECTION, SOLUTION INTRAVENOUS CONTINUOUS
Status: DISCONTINUED | OUTPATIENT
Start: 2023-06-08 | End: 2023-06-10 | Stop reason: HOSPADM

## 2023-06-08 RX ORDER — AMPICILLIN AND SULBACTAM 1; .5 G/1; G/1
1.5 INJECTION, POWDER, FOR SOLUTION INTRAMUSCULAR; INTRAVENOUS SEE ADMIN INSTRUCTIONS
Status: DISCONTINUED | OUTPATIENT
Start: 2023-06-08 | End: 2023-06-08 | Stop reason: HOSPADM

## 2023-06-08 RX ORDER — LIDOCAINE 40 MG/G
CREAM TOPICAL
Status: DISCONTINUED | OUTPATIENT
Start: 2023-06-08 | End: 2023-06-10 | Stop reason: HOSPADM

## 2023-06-08 RX ORDER — SODIUM CHLORIDE, SODIUM LACTATE, POTASSIUM CHLORIDE, CALCIUM CHLORIDE 600; 310; 30; 20 MG/100ML; MG/100ML; MG/100ML; MG/100ML
INJECTION, SOLUTION INTRAVENOUS CONTINUOUS PRN
Status: DISCONTINUED | OUTPATIENT
Start: 2023-06-08 | End: 2023-06-08

## 2023-06-08 RX ORDER — NICOTINE POLACRILEX 4 MG
15-30 LOZENGE BUCCAL
Status: DISCONTINUED | OUTPATIENT
Start: 2023-06-08 | End: 2023-06-10 | Stop reason: HOSPADM

## 2023-06-08 RX ORDER — SODIUM CHLORIDE, SODIUM LACTATE, POTASSIUM CHLORIDE, CALCIUM CHLORIDE 600; 310; 30; 20 MG/100ML; MG/100ML; MG/100ML; MG/100ML
INJECTION, SOLUTION INTRAVENOUS CONTINUOUS
Status: DISCONTINUED | OUTPATIENT
Start: 2023-06-08 | End: 2023-06-08 | Stop reason: HOSPADM

## 2023-06-08 RX ORDER — ACETAMINOPHEN 325 MG/1
650 TABLET ORAL EVERY 4 HOURS PRN
Status: DISCONTINUED | OUTPATIENT
Start: 2023-06-11 | End: 2023-06-10 | Stop reason: HOSPADM

## 2023-06-08 RX ORDER — ONDANSETRON 4 MG/1
4 TABLET, ORALLY DISINTEGRATING ORAL EVERY 6 HOURS PRN
Status: DISCONTINUED | OUTPATIENT
Start: 2023-06-08 | End: 2023-06-10 | Stop reason: HOSPADM

## 2023-06-08 RX ORDER — HYDROMORPHONE HCL IN WATER/PF 6 MG/30 ML
0.2 PATIENT CONTROLLED ANALGESIA SYRINGE INTRAVENOUS
Status: DISCONTINUED | OUTPATIENT
Start: 2023-06-08 | End: 2023-06-10

## 2023-06-08 RX ORDER — FENTANYL CITRATE 50 UG/ML
25 INJECTION, SOLUTION INTRAMUSCULAR; INTRAVENOUS EVERY 5 MIN PRN
Status: CANCELLED | OUTPATIENT
Start: 2023-06-08

## 2023-06-08 RX ORDER — ONDANSETRON 4 MG/1
4 TABLET, ORALLY DISINTEGRATING ORAL EVERY 30 MIN PRN
Status: CANCELLED | OUTPATIENT
Start: 2023-06-08

## 2023-06-08 RX ORDER — HYDROMORPHONE HCL IN WATER/PF 6 MG/30 ML
0.2 PATIENT CONTROLLED ANALGESIA SYRINGE INTRAVENOUS EVERY 5 MIN PRN
Status: DISCONTINUED | OUTPATIENT
Start: 2023-06-08 | End: 2023-06-08 | Stop reason: HOSPADM

## 2023-06-08 RX ORDER — FENTANYL CITRATE 50 UG/ML
50 INJECTION, SOLUTION INTRAMUSCULAR; INTRAVENOUS EVERY 5 MIN PRN
Status: DISCONTINUED | OUTPATIENT
Start: 2023-06-08 | End: 2023-06-08 | Stop reason: HOSPADM

## 2023-06-08 RX ORDER — BISACODYL 10 MG
10 SUPPOSITORY, RECTAL RECTAL DAILY PRN
Status: DISCONTINUED | OUTPATIENT
Start: 2023-06-08 | End: 2023-06-10 | Stop reason: HOSPADM

## 2023-06-08 RX ORDER — HYDROMORPHONE HCL IN WATER/PF 6 MG/30 ML
0.4 PATIENT CONTROLLED ANALGESIA SYRINGE INTRAVENOUS EVERY 5 MIN PRN
Status: DISCONTINUED | OUTPATIENT
Start: 2023-06-08 | End: 2023-06-08 | Stop reason: HOSPADM

## 2023-06-08 RX ORDER — FENTANYL CITRATE 50 UG/ML
INJECTION, SOLUTION INTRAMUSCULAR; INTRAVENOUS PRN
Status: DISCONTINUED | OUTPATIENT
Start: 2023-06-08 | End: 2023-06-08

## 2023-06-08 RX ORDER — NALOXONE HYDROCHLORIDE 0.4 MG/ML
0.2 INJECTION, SOLUTION INTRAMUSCULAR; INTRAVENOUS; SUBCUTANEOUS
Status: DISCONTINUED | OUTPATIENT
Start: 2023-06-08 | End: 2023-06-10 | Stop reason: HOSPADM

## 2023-06-08 RX ORDER — ONDANSETRON 4 MG/1
4 TABLET, ORALLY DISINTEGRATING ORAL EVERY 30 MIN PRN
Status: DISCONTINUED | OUTPATIENT
Start: 2023-06-08 | End: 2023-06-08 | Stop reason: HOSPADM

## 2023-06-08 RX ORDER — DEXTROSE MONOHYDRATE 25 G/50ML
25-50 INJECTION, SOLUTION INTRAVENOUS
Status: DISCONTINUED | OUTPATIENT
Start: 2023-06-08 | End: 2023-06-10 | Stop reason: HOSPADM

## 2023-06-08 RX ORDER — HYDROMORPHONE HCL IN WATER/PF 6 MG/30 ML
0.4 PATIENT CONTROLLED ANALGESIA SYRINGE INTRAVENOUS
Status: DISCONTINUED | OUTPATIENT
Start: 2023-06-08 | End: 2023-06-10

## 2023-06-08 RX ORDER — AMPICILLIN AND SULBACTAM 2; 1 G/1; G/1
3 INJECTION, POWDER, FOR SOLUTION INTRAMUSCULAR; INTRAVENOUS
Status: COMPLETED | OUTPATIENT
Start: 2023-06-08 | End: 2023-06-08

## 2023-06-08 RX ORDER — CHLORHEXIDINE GLUCONATE ORAL RINSE 1.2 MG/ML
SOLUTION DENTAL PRN
Status: DISCONTINUED | OUTPATIENT
Start: 2023-06-08 | End: 2023-06-08 | Stop reason: HOSPADM

## 2023-06-08 RX ORDER — ENOXAPARIN SODIUM 100 MG/ML
40 INJECTION SUBCUTANEOUS EVERY 24 HOURS
Status: DISCONTINUED | OUTPATIENT
Start: 2023-06-09 | End: 2023-06-10 | Stop reason: HOSPADM

## 2023-06-08 RX ORDER — ONDANSETRON 2 MG/ML
4 INJECTION INTRAMUSCULAR; INTRAVENOUS EVERY 30 MIN PRN
Status: DISCONTINUED | OUTPATIENT
Start: 2023-06-08 | End: 2023-06-08 | Stop reason: HOSPADM

## 2023-06-08 RX ORDER — LIDOCAINE HYDROCHLORIDE 20 MG/ML
INJECTION, SOLUTION INFILTRATION; PERINEURAL PRN
Status: DISCONTINUED | OUTPATIENT
Start: 2023-06-08 | End: 2023-06-08

## 2023-06-08 RX ORDER — CEFAZOLIN SODIUM/WATER 2 G/20 ML
2 SYRINGE (ML) INTRAVENOUS
Status: CANCELLED | OUTPATIENT
Start: 2023-06-08

## 2023-06-08 RX ORDER — FENTANYL CITRATE 50 UG/ML
25 INJECTION, SOLUTION INTRAMUSCULAR; INTRAVENOUS EVERY 5 MIN PRN
Status: DISCONTINUED | OUTPATIENT
Start: 2023-06-08 | End: 2023-06-08 | Stop reason: HOSPADM

## 2023-06-08 RX ORDER — CEFAZOLIN SODIUM/WATER 2 G/20 ML
2 SYRINGE (ML) INTRAVENOUS SEE ADMIN INSTRUCTIONS
Status: CANCELLED | OUTPATIENT
Start: 2023-06-08

## 2023-06-08 RX ORDER — AMOXICILLIN 250 MG
1 CAPSULE ORAL 2 TIMES DAILY
Status: DISCONTINUED | OUTPATIENT
Start: 2023-06-08 | End: 2023-06-10 | Stop reason: HOSPADM

## 2023-06-08 RX ORDER — SODIUM CHLORIDE, SODIUM LACTATE, POTASSIUM CHLORIDE, CALCIUM CHLORIDE 600; 310; 30; 20 MG/100ML; MG/100ML; MG/100ML; MG/100ML
INJECTION, SOLUTION INTRAVENOUS CONTINUOUS
Status: CANCELLED | OUTPATIENT
Start: 2023-06-08

## 2023-06-08 RX ADMIN — HYDROMORPHONE HYDROCHLORIDE 0.2 MG: 4 INJECTION, SOLUTION INTRAMUSCULAR; INTRAVENOUS; SUBCUTANEOUS at 14:31

## 2023-06-08 RX ADMIN — SODIUM CHLORIDE, POTASSIUM CHLORIDE, SODIUM LACTATE AND CALCIUM CHLORIDE: 600; 310; 30; 20 INJECTION, SOLUTION INTRAVENOUS at 15:50

## 2023-06-08 RX ADMIN — OXYCODONE HYDROCHLORIDE 10 MG: 10 TABLET ORAL at 19:43

## 2023-06-08 RX ADMIN — HYDROMORPHONE HYDROCHLORIDE 0.2 MG: 0.2 INJECTION, SOLUTION INTRAMUSCULAR; INTRAVENOUS; SUBCUTANEOUS at 17:59

## 2023-06-08 RX ADMIN — PROPOFOL 200 MG: 10 INJECTION, EMULSION INTRAVENOUS at 10:36

## 2023-06-08 RX ADMIN — HYDROMORPHONE HYDROCHLORIDE 0.4 MG: 0.2 INJECTION, SOLUTION INTRAMUSCULAR; INTRAVENOUS; SUBCUTANEOUS at 15:53

## 2023-06-08 RX ADMIN — FENTANYL CITRATE 50 MCG: 50 INJECTION, SOLUTION INTRAMUSCULAR; INTRAVENOUS at 14:38

## 2023-06-08 RX ADMIN — Medication 50 MG: at 10:36

## 2023-06-08 RX ADMIN — AMPICILLIN SODIUM AND SULBACTAM SODIUM 3 G: 2; 1 INJECTION, POWDER, FOR SOLUTION INTRAMUSCULAR; INTRAVENOUS at 10:42

## 2023-06-08 RX ADMIN — SODIUM CHLORIDE, POTASSIUM CHLORIDE, SODIUM LACTATE AND CALCIUM CHLORIDE: 600; 310; 30; 20 INJECTION, SOLUTION INTRAVENOUS at 13:19

## 2023-06-08 RX ADMIN — AMPICILLIN SODIUM AND SULBACTAM SODIUM 1.5 G: 2; 1 INJECTION, POWDER, FOR SOLUTION INTRAMUSCULAR; INTRAVENOUS at 12:42

## 2023-06-08 RX ADMIN — ACETAMINOPHEN 975 MG: 325 TABLET, FILM COATED ORAL at 23:33

## 2023-06-08 RX ADMIN — HYDROMORPHONE HYDROCHLORIDE 0.2 MG: 0.2 INJECTION, SOLUTION INTRAMUSCULAR; INTRAVENOUS; SUBCUTANEOUS at 21:49

## 2023-06-08 RX ADMIN — FENTANYL CITRATE 25 MCG: 50 INJECTION, SOLUTION INTRAMUSCULAR; INTRAVENOUS at 14:47

## 2023-06-08 RX ADMIN — SENNOSIDES AND DOCUSATE SODIUM 1 TABLET: 50; 8.6 TABLET ORAL at 19:43

## 2023-06-08 RX ADMIN — OXYCODONE HYDROCHLORIDE 10 MG: 10 TABLET ORAL at 15:04

## 2023-06-08 RX ADMIN — SODIUM CHLORIDE, POTASSIUM CHLORIDE, SODIUM LACTATE AND CALCIUM CHLORIDE: 600; 310; 30; 20 INJECTION, SOLUTION INTRAVENOUS at 10:03

## 2023-06-08 RX ADMIN — FENTANYL CITRATE 25 MCG: 50 INJECTION, SOLUTION INTRAMUSCULAR; INTRAVENOUS at 14:53

## 2023-06-08 RX ADMIN — ACETAMINOPHEN 975 MG: 325 TABLET, FILM COATED ORAL at 15:04

## 2023-06-08 RX ADMIN — SODIUM CHLORIDE, SODIUM LACTATE, POTASSIUM CHLORIDE, CALCIUM CHLORIDE: 600; 310; 30; 20 INJECTION, SOLUTION INTRAVENOUS at 11:00

## 2023-06-08 RX ADMIN — LIDOCAINE HYDROCHLORIDE 100 MG: 20 INJECTION, SOLUTION INFILTRATION; PERINEURAL at 10:36

## 2023-06-08 RX ADMIN — FENTANYL CITRATE 100 MCG: 50 INJECTION, SOLUTION INTRAMUSCULAR; INTRAVENOUS at 10:36

## 2023-06-08 RX ADMIN — HYDROMORPHONE HYDROCHLORIDE 0.4 MG: 0.2 INJECTION, SOLUTION INTRAMUSCULAR; INTRAVENOUS; SUBCUTANEOUS at 15:09

## 2023-06-08 RX ADMIN — REMIFENTANIL HYDROCHLORIDE 0.05 MCG/KG/MIN: 1 INJECTION, POWDER, LYOPHILIZED, FOR SOLUTION INTRAVENOUS at 10:46

## 2023-06-08 RX ADMIN — FENTANYL CITRATE 50 MCG: 50 INJECTION, SOLUTION INTRAMUSCULAR; INTRAVENOUS at 11:19

## 2023-06-08 RX ADMIN — CHLORHEXIDINE GLUCONATE 15 ML: 1.2 SOLUTION ORAL at 19:42

## 2023-06-08 RX ADMIN — DEXAMETHASONE SODIUM PHOSPHATE 4 MG: 4 INJECTION, SOLUTION INTRA-ARTICULAR; INTRALESIONAL; INTRAMUSCULAR; INTRAVENOUS; SOFT TISSUE at 10:52

## 2023-06-08 RX ADMIN — ONDANSETRON 4 MG: 2 INJECTION INTRAMUSCULAR; INTRAVENOUS at 13:33

## 2023-06-08 RX ADMIN — SODIUM CHLORIDE, POTASSIUM CHLORIDE, SODIUM LACTATE AND CALCIUM CHLORIDE: 600; 310; 30; 20 INJECTION, SOLUTION INTRAVENOUS at 17:25

## 2023-06-08 RX ADMIN — SUGAMMADEX 200 MG: 100 INJECTION, SOLUTION INTRAVENOUS at 14:10

## 2023-06-08 RX ADMIN — MIDAZOLAM 2 MG: 1 INJECTION INTRAMUSCULAR; INTRAVENOUS at 10:15

## 2023-06-08 ASSESSMENT — ACTIVITIES OF DAILY LIVING (ADL)
ADLS_ACUITY_SCORE: 22
DIFFICULTY_COMMUNICATING: NO
DOING_ERRANDS_INDEPENDENTLY_DIFFICULTY: NO
DIFFICULTY_EATING/SWALLOWING: NO
ADLS_ACUITY_SCORE: 22
ADLS_ACUITY_SCORE: 20
TOILETING_ISSUES: NO
WEAR_GLASSES_OR_BLIND: YES
FALL_HISTORY_WITHIN_LAST_SIX_MONTHS: NO
WALKING_OR_CLIMBING_STAIRS_DIFFICULTY: NO
ADLS_ACUITY_SCORE: 22
CONCENTRATING,_REMEMBERING_OR_MAKING_DECISIONS_DIFFICULTY: NO
ADLS_ACUITY_SCORE: 22
DRESSING/BATHING_DIFFICULTY: NO

## 2023-06-08 ASSESSMENT — LIFESTYLE VARIABLES: TOBACCO_USE: 0

## 2023-06-08 ASSESSMENT — ENCOUNTER SYMPTOMS: SEIZURES: 0

## 2023-06-08 NOTE — PLAN OF CARE
Arrived from: PACU  Belongings/meds: Remains with patient, no meds  2 RN Skin Assessment Completed by: Haley Harper   Non-intact findings documented (yes/no/NA): Left neck incision site BRAVO with JPx1, right tongue incision. NG tube in place    Status: S/p Right partial glossectomy, nasogastric tube placement  right modfied radical neck dissection  Vitals: VSS on RA  Neuros: A&Ox4, strength 5/5   IV: PIV infusing  ml/hr   Labs/Electrolytes: BG checks   Resp/trach: WNL  Diet: Full liquid, Gluten free diet  Bowel status: LBM today   : Voids without difficulties   Skin: Left neck incision site BRAVO with JPx1, right tongue incision. NG tube in place   Pain: PRN IV Dilaudid and Oxy given for pain   Activity: SBA/GB  Social:  and family visited at bedside-supportive   Plan: PT/OT consulted, Continue to monitor and follow POC

## 2023-06-08 NOTE — ANESTHESIA PREPROCEDURE EVALUATION
Anesthesia Pre-Procedure Evaluation    Patient: Kaya Wagner   MRN: [id number] : 1985        Procedure : Procedure(s):  Right partial glossectomy, possible nasogastric tube placement  right modfied radical neck dissection          History reviewed. No pertinent past medical history.   Past Surgical History:   Procedure Laterality Date     AS ESOPHAGOSCOPY, DIAGNOSTIC        SECTION        Allergies   Allergen Reactions     Dust Mites      Gluten Meal Diarrhea, Nausea, Nausea and Vomiting and Other (See Comments)     Vomiting and diarrhea       Pollen Extract       Social History     Tobacco Use     Smoking status: Never     Smokeless tobacco: Never   Vaping Use     Vaping status: Not on file   Substance Use Topics     Alcohol use: Not Currently      Wt Readings from Last 1 Encounters:   23 103.9 kg (229 lb)        Anesthesia Evaluation   Pt has had prior anesthetic. Type: Regional.        ROS/MED HX  ENT/Pulmonary:    (-) tobacco use, asthma and sleep apnea   Neurologic: Comment: Hx of Narcolepsy    (+) migraines,  (-) no seizures   Cardiovascular:    (-) hypertension   METS/Exercise Tolerance: >4 METS    Hematologic:    (-) history of blood clots, anemia and history of blood transfusion   Musculoskeletal:    (-) muscular dystrophy   GI/Hepatic:    (-) GERD   Renal/Genitourinary:    (-) renal disease, nephrolithiasis, BPH and History of transplant   Endo:    (-) Type I DM and Type II DM   Psychiatric/Substance Use:     (+) psychiatric history anxiety and depression     Infectious Disease:  - neg infectious disease ROS     Malignancy: Comment: SCC of tongue      Other:            Physical Exam    Airway        Mallampati: II       Respiratory Devices and Support         Dental       (+) Completely normal teeth      Cardiovascular          Rhythm and rate: regular     Pulmonary           breath sounds clear to auscultation           OUTSIDE LABS:  CBC: No results found for: WBC, HGB, HCT,  PLT  BMP:   Lab Results   Component Value Date     (H) 06/08/2023     COAGS: No results found for: PTT, INR, FIBR  POC: No results found for: BGM, HCG, HCGS  HEPATIC: No results found for: ALBUMIN, PROTTOTAL, ALT, AST, GGT, ALKPHOS, BILITOTAL, BILIDIRECT, KARMA  OTHER: No results found for: PH, LACT, A1C, RY, PHOS, MAG, LIPASE, AMYLASE, TSH, T4, T3, CRP, SED    Anesthesia Plan    ASA Status:  2      Anesthesia Type: General.     - Airway: ETT   Induction: Propofol, Intravenous.   Maintenance: Balanced.        Consents    Anesthesia Plan(s) and associated risks, benefits, and realistic alternatives discussed. Questions answered and patient/representative(s) expressed understanding.     - Discussed: Risks, Benefits and Alternatives for BOTH SEDATION and the PROCEDURE were discussed     - Discussed with:  Patient      - Extended Intubation/Ventilatory Support Discussed: No.      - Patient is DNR/DNI Status: No    Use of blood products discussed: Yes.     - Discussed with: Patient.     Postoperative Care    Pain management: IV analgesics.   PONV prophylaxis: Dexamethasone or Solumedrol, Ondansetron (or other 5HT-3)     Comments:                Markie Kwok MD

## 2023-06-08 NOTE — LETTER
Kaya Wagner MRN# [id number]   YOB: 1985 Age: 37 year old     Date of Admission:  6/8/23  Date of Discharge:  6/10/2023  Admitting Physician:  Jabier London MD  Discharging Physician: Jabier London MD (Contact: [phone])  Discharging Service:  Otolaryngology  Hospitalization Status: Outpatient     Primary Care Provider: Storm Fitzpatrick     To Whom it May Concern:            You have been identified as the Primary Care Provider for Kaya Wagner, who was recently admitted to the [facility].  Thank you for the referral to our hospital.  It is our goal to provide the highest quality of care for our patients, including planning for seamless continuity of care by providing you with timely, accurate and concise information.  After reviewing the following combined discharge summary and final progress note, please contact us if you have any remaining questions.  The Discharging Physician will be the best informed, with their contact information listed above.  If unable to reach them, or if you have received this letter in error, please call [phone] and someone will try to help you.

## 2023-06-08 NOTE — ANESTHESIA CARE TRANSFER NOTE
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    Anesthesia Type:   General     Note:    Oropharynx: oropharynx clear of all foreign objects and spontaneously breathing  Level of Consciousness: awake  Oxygen Supplementation: face mask  Level of Supplemental Oxygen (L/min / FiO2): 6  Independent Airway: airway patency satisfactory and stable  Dentition: dentition unchanged  Vital Signs Stable: post-procedure vital signs reviewed and stable  Report to RN Given: handoff report given  Patient transferred to: PACU    Handoff Report: Identifed the Patient, Identified the Reponsible Provider, Reviewed the pertinent medical history, Discussed the surgical course, Reviewed Intra-OP anesthesia mangement and issues during anesthesia, Set expectations for post-procedure period and Allowed opportunity for questions and acknowledgement of understanding      Vitals:  Vitals Value Taken Time   BP     Temp     Pulse     Resp     SpO2 99 % 06/08/23 1421   Vitals shown include unvalidated device data.    Electronically Signed By: HUANG Bautista CRNA  June 8, 2023  2:22 PM

## 2023-06-08 NOTE — OR NURSING
Notified 6A, unit slotted for post op transfer, requested private room & refrigerator for breast milk.

## 2023-06-08 NOTE — BRIEF OP NOTE
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]  Post-operative diagnosis Same as pre-operative diagnosis    Procedure: Procedure(s):  Right partial glossectomy, nasogastric tube placement  right modfied radical neck dissection  Surgeon: Surgeon(s) and Role:     * Jabier London MD - Primary     * Mari Garcia MD - Resident - Assisting  Anesthesia: General   Estimated Blood Loss: 40 mL    Drains:  PHILLIP x1   Specimens:   ID Type Source Tests Collected by Time Destination   1 : Right Partial Glossectomy Tissue Other SURGICAL PATHOLOGY EXAM Jabier London MD 6/8/2023 11:28 AM    2 : additional deep margin Tissue Other SURGICAL PATHOLOGY EXAM Jabier London MD 6/8/2023 11:36 AM    3 : right level 1B Tissue Other SURGICAL PATHOLOGY EXAM Jabier London MD 6/8/2023 12:27 PM    4 : right level 2A Tissue Other SURGICAL PATHOLOGY EXAM Jabier London MD 6/8/2023  1:08 PM    5 : right level 3 Tissue Other SURGICAL PATHOLOGY EXAM Jabier London MD 6/8/2023  1:09 PM    6 : right level 4 Tissue Other SURGICAL PATHOLOGY EXAM Jabier London MD 6/8/2023  1:09 PM    7 : right level 2B Tissue Other SURGICAL PATHOLOGY EXAM Jabier London MD 6/8/2023  1:12 PM    8 : posterior lateral reresection Tissue Other SURGICAL PATHOLOGY EXAM Jabier London MD 6/8/2023  1:25 PM    9 : Posterior lateral margin #2 Tissue Other SURGICAL PATHOLOGY EXAM Jabier London MD 6/8/2023  1:25 PM      Findings: Right lateral oral tongue ulcerative lesion. Additional posterolateral margin sent, negative for malignancy. Right neck dissection levels 1B-4 completed.  Complications: None  Implants: * No implants in log *

## 2023-06-08 NOTE — ANESTHESIA PROCEDURE NOTES
Airway       Patient location during procedure: OR       Procedure Start/Stop Times: 6/8/2023 10:40 AM  Staff -        Other Anesthesia Staff: Tasha Hoyt       Performed By: SRNA  Consent for Airway        Urgency: elective  Indications and Patient Condition       Indications for airway management: denae-procedural         Mask difficulty assessment: 1 - vent by mask    Final Airway Details       Final airway type: endotracheal airway       Successful airway: ETT - single  Endotracheal Airway Details        ETT size (mm): 6.0       Cuffed: yes       Successful intubation technique: direct laryngoscopy       DL Blade Type: MAC 3       Grade View of Cords: 1       Adjucts: stylet       Position: Left       Measured from: lips       Secured at (cm): 24       Bite block used: None    Post intubation assessment        Placement verified by: capnometry, equal breath sounds and chest rise        Number of attempts at approach: 1       Number of other approaches attempted: 0       Secured with: pink tape       Ease of procedure: easy       Dentition: Intact and Unchanged    Medication(s) Administered   Medication Administration Time: 6/8/2023 10:40 AM    Additional Comments       6.0 ETT per ENT request

## 2023-06-08 NOTE — ANESTHESIA POSTPROCEDURE EVALUATION
Patient: Kaya Wagner    Procedure: Procedure(s):  Right partial glossectomy, nasogastric tube placement  right modfied radical neck dissection       Anesthesia Type:  General    Note:  Disposition: Outpatient   Postop Pain Control: Uneventful            Sign Out: Well controlled pain   PONV: No   Neuro/Psych: Uneventful            Sign Out: Acceptable/Baseline neuro status   Airway/Respiratory: Uneventful            Sign Out: Acceptable/Baseline resp. status   CV/Hemodynamics: Uneventful            Sign Out: Acceptable CV status; No obvious hypovolemia; No obvious fluid overload   Other NRE: NONE   DID A NON-ROUTINE EVENT OCCUR? No           Last vitals:  Vitals Value Taken Time   /83 06/08/23 1500   Temp 36.8  C (98.2  F) 06/08/23 1420   Pulse 70 06/08/23 1515   Resp 17 06/08/23 1515   SpO2 96 % 06/08/23 1515   Vitals shown include unvalidated device data.    Electronically Signed By: Markie Kwok MD  June 8, 2023  3:16 PM

## 2023-06-08 NOTE — OP NOTE
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      PROCEDURES PERFORMED:    1.  Right partial glossectomy.  2.  Right modified radical neck dissection, levels 1B-4.  3.  Nasogastric tube placement.      COMPLICATIONS:  None.       ESTIMATED BLOOD LOSS:  40 mL.       SPECIMENS:  Right partial glossectomy and right levels 1B, 2B, and 2A-4 sent for permanent pathology.      ANESTHESIA:  General.     OPERATIVE FINDINGS:  Right lateral oral tongue ulcerative lesion consistent with biopsy-proven squamous cell carcinoma.  Initial posterolateral margin positive with additional intraoperative posterolateral margin sent and confirmed negative for malignancy.  No enlarged nodes noted in right neck dissection levels 1B-4.     INDICATIONS FOR PROCEDURE:  Kaya Wagner is a 37-year-old woman recently diagnosed with squamous cell carcinoma of the right oral tongue.  Staging imaging was performed which demonstrated a 1.0 cm FDG avid lesion of the right posterior lateral tongue with no evidence of regional or distant disease.  Surgical management with partial glossectomy and selective neck dissection was discussed, and after discussion of the risks, benefits, and alternatives to surgery, the patient wished to proceed.     DESCRIPTION OF PROCEDURE:  The patient was met in the preoperative area and informed consent was verified.  The patient was then brought back to the operating room and placed supine on the operating room table.  General anesthesia was induced and the patient was orotracheally intubated without difficulty with the tube secured on the left.  Monitoring lines were placed as appropriate.  The patient's neck was prepped and draped in the usual sterile fashion.  An institutional timeout was performed and  everyone was in agreement with the patient identification, the procedure to be performed, and the site.     Starting with the partial glossectomy, the lesion was exposed with a blue cheek retractor and side biter.  A suture was placed through the midline anterior tongue for retraction.  The above findings were noted.  Monopolar cautery was used to carlos out appropriate 1-cm margins from the edges of the ulcerative tumor.  The lesion was then removed in its entirety and passed off the field for permanent pathology.  Hemostasis was achieved with bipolar cautery and clips.  Margins were taken sharply and sent for intraoperative frozen analysis.  The posterolateral margin was initially called with concern for malignancy, and so an additional resection and margin were sent in this quadrant.  After the final margins were confirmed negative, a xeroform bolster was fashioned and sutured to the tongue with silk sutures.  A nasogastric tube was placed and secured with a transseptal suture.     Moving to the neck dissection after confirmation of the primary tumor's depth of invasion, a transverse incision was marked in the right neck and infiltrated with 1:100,000 epinephrine.  Incision was made with a #15 blade and carried down through the subcutaneous tissue and platysma with monopolar cautery.  Superior and inferior subplatysmal flaps were raised to the level of the mandible and clavicle, respectively.  A #15 blade was used to carefully incise the fascia overlying the left submandibular gland.  The marginal mandibular nerve was identified and traced anteriorly and posteriorly.  The fascia was divided under this nerve and reflected superiorly in order to protect it.  The ramona packet was then released from the underside of the mandible.  The anterior belly of the digastric muscle was identified and traced in an anterior to posterior direction.  The fibrofatty tissue was then retracted laterally off the mylohyoid muscle until  its free margin was reached posteriorly.  This muscle was then retracted anteriorly with identification of the lingual nerve and submandibular ganglion, submandibular duct, and hypoglossal nerve.  The submandibular ganglion was clipped and divided.  [place]'s duct was then also divided and ligated.  The facial vessels were clipped at the submandibular gland.  The ramona packet continued to be mobilized posteriorly until the posterior belly of the digastric muscle was reached.  This tissue was then sent off the field for permanent pathology as level 1B.  Next, we turned our attention to the level 2A-4 dissection.  The fascia was released off the sternocleidomastoid muscle with monopolar cautery.  The anterior border of the SCM continued to be skeletonized until the floor of the neck was reached.  The spinal accessory nerve was identified and dissected superiorly towards the internal jugular vein.  The cervical rootlets were identified and we transitioned to a more anteriorly oriented dissection.  Inferiorly, the omohyoid was identified.  The contents of level IV were clipped to avoid a chyle leak.  The specimen continued to be dissected medially off the great vessels until the strap musculature was reached, then sent for permanent pathology.  Attention was then turned to level 2B.  The ramona contents were grasped with an Allis clamp and removed using scissors, ensuring preservation of the spinal accessory nerve.  This specimen was separately sent for permanent pathology.  Hemostasis was ensured with bipolar cautery.  A valsalva was performed with no evidence of chyle leak.  A 10-Fr Miguel-Downey drain was placed and secured with a 3-0 nylon suture.  The platysma was closed with a series of 3-0 Vicryl sutures.  The skin was closed with 3-0 Vicryls for the deep dermis and a simple running 4-0 nylon suture.  The incision was then covered with bacitracin.  This concluded the procedure.  There were no intraoperative  complications and all counts were correct.  The patient was then turned back to the Anesthesia team for uneventful wake-up and extubation.     Dr. London was present and participated in the entirety of the procedure.     Mari Garcia MD PGY-5  Otolaryngology - Head & Neck Surgery

## 2023-06-09 ENCOUNTER — APPOINTMENT (OUTPATIENT)
Dept: OCCUPATIONAL THERAPY | Facility: CLINIC | Age: 38
DRG: 141 | End: 2023-06-09
Attending: OTOLARYNGOLOGY
Payer: OTHER GOVERNMENT

## 2023-06-09 ENCOUNTER — APPOINTMENT (OUTPATIENT)
Dept: SPEECH THERAPY | Facility: CLINIC | Age: 38
DRG: 141 | End: 2023-06-09
Attending: OTOLARYNGOLOGY
Payer: OTHER GOVERNMENT

## 2023-06-09 LAB
ANION GAP SERPL CALCULATED.3IONS-SCNC: 10 MMOL/L (ref 7–15)
BUN SERPL-MCNC: 12.3 MG/DL (ref 6–20)
CALCIUM SERPL-MCNC: 9 MG/DL (ref 8.6–10)
CHLORIDE SERPL-SCNC: 102 MMOL/L (ref 98–107)
CREAT SERPL-MCNC: 0.75 MG/DL (ref 0.51–0.95)
DEPRECATED HCO3 PLAS-SCNC: 27 MMOL/L (ref 22–29)
ERYTHROCYTE [DISTWIDTH] IN BLOOD BY AUTOMATED COUNT: 13.1 % (ref 10–15)
GFR SERPL CREATININE-BSD FRML MDRD: >90 ML/MIN/1.73M2
GLUCOSE BLDC GLUCOMTR-MCNC: 136 MG/DL (ref 70–99)
GLUCOSE BLDC GLUCOMTR-MCNC: 138 MG/DL (ref 70–99)
GLUCOSE BLDC GLUCOMTR-MCNC: 151 MG/DL (ref 70–99)
GLUCOSE BLDC GLUCOMTR-MCNC: 151 MG/DL (ref 70–99)
GLUCOSE BLDC GLUCOMTR-MCNC: 153 MG/DL (ref 70–99)
GLUCOSE SERPL-MCNC: 114 MG/DL (ref 70–99)
HCT VFR BLD AUTO: 35.6 % (ref 35–47)
HGB BLD-MCNC: 11.6 G/DL (ref 11.7–15.7)
MCH RBC QN AUTO: 29.2 PG (ref 26.5–33)
MCHC RBC AUTO-ENTMCNC: 32.6 G/DL (ref 31.5–36.5)
MCV RBC AUTO: 90 FL (ref 78–100)
PLATELET # BLD AUTO: 249 10E3/UL (ref 150–450)
POTASSIUM SERPL-SCNC: 4.6 MMOL/L (ref 3.4–5.3)
RBC # BLD AUTO: 3.97 10E6/UL (ref 3.8–5.2)
SODIUM SERPL-SCNC: 139 MMOL/L (ref 136–145)
WBC # BLD AUTO: 10.5 10E3/UL (ref 4–11)

## 2023-06-09 PROCEDURE — 999N000147 HC STATISTIC PT IP EVAL DEFER

## 2023-06-09 PROCEDURE — 258N000003 HC RX IP 258 OP 636: Performed by: STUDENT IN AN ORGANIZED HEALTH CARE EDUCATION/TRAINING PROGRAM

## 2023-06-09 PROCEDURE — 92610 EVALUATE SWALLOWING FUNCTION: CPT | Mod: GN

## 2023-06-09 PROCEDURE — 250N000011 HC RX IP 250 OP 636: Performed by: STUDENT IN AN ORGANIZED HEALTH CARE EDUCATION/TRAINING PROGRAM

## 2023-06-09 PROCEDURE — 97165 OT EVAL LOW COMPLEX 30 MIN: CPT | Mod: GO

## 2023-06-09 PROCEDURE — 36415 COLL VENOUS BLD VENIPUNCTURE: CPT | Performed by: STUDENT IN AN ORGANIZED HEALTH CARE EDUCATION/TRAINING PROGRAM

## 2023-06-09 PROCEDURE — 250N000009 HC RX 250: Performed by: STUDENT IN AN ORGANIZED HEALTH CARE EDUCATION/TRAINING PROGRAM

## 2023-06-09 PROCEDURE — 97530 THERAPEUTIC ACTIVITIES: CPT | Mod: GO

## 2023-06-09 PROCEDURE — 92526 ORAL FUNCTION THERAPY: CPT | Mod: GN

## 2023-06-09 PROCEDURE — 120N000002 HC R&B MED SURG/OB UMMC

## 2023-06-09 PROCEDURE — 97110 THERAPEUTIC EXERCISES: CPT | Mod: GO

## 2023-06-09 PROCEDURE — 250N000011 HC RX IP 250 OP 636

## 2023-06-09 PROCEDURE — 250N000013 HC RX MED GY IP 250 OP 250 PS 637

## 2023-06-09 PROCEDURE — 80048 BASIC METABOLIC PNL TOTAL CA: CPT | Performed by: STUDENT IN AN ORGANIZED HEALTH CARE EDUCATION/TRAINING PROGRAM

## 2023-06-09 PROCEDURE — 250N000013 HC RX MED GY IP 250 OP 250 PS 637: Performed by: STUDENT IN AN ORGANIZED HEALTH CARE EDUCATION/TRAINING PROGRAM

## 2023-06-09 PROCEDURE — 85027 COMPLETE CBC AUTOMATED: CPT | Performed by: STUDENT IN AN ORGANIZED HEALTH CARE EDUCATION/TRAINING PROGRAM

## 2023-06-09 RX ORDER — GINSENG 100 MG
CAPSULE ORAL 3 TIMES DAILY
Status: COMPLETED | OUTPATIENT
Start: 2023-06-09 | End: 2023-06-09

## 2023-06-09 RX ORDER — MINERAL OIL/HYDROPHIL PETROLAT
OINTMENT (GRAM) TOPICAL 3 TIMES DAILY
Status: DISCONTINUED | OUTPATIENT
Start: 2023-06-10 | End: 2023-06-10 | Stop reason: HOSPADM

## 2023-06-09 RX ORDER — DEXAMETHASONE SODIUM PHOSPHATE 4 MG/ML
8 INJECTION, SOLUTION INTRA-ARTICULAR; INTRALESIONAL; INTRAMUSCULAR; INTRAVENOUS; SOFT TISSUE EVERY 8 HOURS
Status: COMPLETED | OUTPATIENT
Start: 2023-06-09 | End: 2023-06-10

## 2023-06-09 RX ADMIN — ACETAMINOPHEN 975 MG: 325 TABLET, FILM COATED ORAL at 08:38

## 2023-06-09 RX ADMIN — DEXAMETHASONE SODIUM PHOSPHATE 8 MG: 4 INJECTION, SOLUTION INTRA-ARTICULAR; INTRALESIONAL; INTRAMUSCULAR; INTRAVENOUS; SOFT TISSUE at 08:38

## 2023-06-09 RX ADMIN — CHLORHEXIDINE GLUCONATE 15 ML: 1.2 SOLUTION ORAL at 08:38

## 2023-06-09 RX ADMIN — CHLORHEXIDINE GLUCONATE 15 ML: 1.2 SOLUTION ORAL at 13:36

## 2023-06-09 RX ADMIN — BACITRACIN: 500 OINTMENT TOPICAL at 09:13

## 2023-06-09 RX ADMIN — BACITRACIN: 500 OINTMENT TOPICAL at 13:36

## 2023-06-09 RX ADMIN — OXYCODONE HYDROCHLORIDE 10 MG: 10 TABLET ORAL at 03:41

## 2023-06-09 RX ADMIN — ACETAMINOPHEN 975 MG: 325 TABLET, FILM COATED ORAL at 15:55

## 2023-06-09 RX ADMIN — ONDANSETRON 4 MG: 4 TABLET, ORALLY DISINTEGRATING ORAL at 00:23

## 2023-06-09 RX ADMIN — HYDROMORPHONE HYDROCHLORIDE 0.2 MG: 0.2 INJECTION, SOLUTION INTRAMUSCULAR; INTRAVENOUS; SUBCUTANEOUS at 00:34

## 2023-06-09 RX ADMIN — OXYCODONE HYDROCHLORIDE 10 MG: 10 TABLET ORAL at 19:59

## 2023-06-09 RX ADMIN — OXYCODONE HYDROCHLORIDE 10 MG: 10 TABLET ORAL at 13:36

## 2023-06-09 RX ADMIN — HYDROMORPHONE HYDROCHLORIDE 0.4 MG: 0.2 INJECTION, SOLUTION INTRAMUSCULAR; INTRAVENOUS; SUBCUTANEOUS at 02:10

## 2023-06-09 RX ADMIN — DEXAMETHASONE SODIUM PHOSPHATE 8 MG: 4 INJECTION, SOLUTION INTRA-ARTICULAR; INTRALESIONAL; INTRAMUSCULAR; INTRAVENOUS; SOFT TISSUE at 15:55

## 2023-06-09 RX ADMIN — SODIUM CHLORIDE, POTASSIUM CHLORIDE, SODIUM LACTATE AND CALCIUM CHLORIDE: 600; 310; 30; 20 INJECTION, SOLUTION INTRAVENOUS at 02:08

## 2023-06-09 RX ADMIN — SENNOSIDES AND DOCUSATE SODIUM 1 TABLET: 50; 8.6 TABLET ORAL at 08:38

## 2023-06-09 RX ADMIN — SENNOSIDES AND DOCUSATE SODIUM 1 TABLET: 50; 8.6 TABLET ORAL at 19:49

## 2023-06-09 RX ADMIN — OXYCODONE HYDROCHLORIDE 10 MG: 10 TABLET ORAL at 08:38

## 2023-06-09 RX ADMIN — SODIUM CHLORIDE, POTASSIUM CHLORIDE, SODIUM LACTATE AND CALCIUM CHLORIDE: 600; 310; 30; 20 INJECTION, SOLUTION INTRAVENOUS at 22:04

## 2023-06-09 RX ADMIN — ENOXAPARIN SODIUM 40 MG: 40 INJECTION SUBCUTANEOUS at 11:45

## 2023-06-09 RX ADMIN — BACITRACIN: 500 OINTMENT TOPICAL at 19:49

## 2023-06-09 RX ADMIN — HYDROMORPHONE HYDROCHLORIDE 0.2 MG: 0.2 INJECTION, SOLUTION INTRAMUSCULAR; INTRAVENOUS; SUBCUTANEOUS at 12:34

## 2023-06-09 RX ADMIN — HYDROMORPHONE HYDROCHLORIDE 0.2 MG: 0.2 INJECTION, SOLUTION INTRAMUSCULAR; INTRAVENOUS; SUBCUTANEOUS at 10:13

## 2023-06-09 RX ADMIN — CHLORHEXIDINE GLUCONATE 15 ML: 1.2 SOLUTION ORAL at 19:49

## 2023-06-09 RX ADMIN — SODIUM CHLORIDE, POTASSIUM CHLORIDE, SODIUM LACTATE AND CALCIUM CHLORIDE: 600; 310; 30; 20 INJECTION, SOLUTION INTRAVENOUS at 11:45

## 2023-06-09 RX ADMIN — HYDROMORPHONE HYDROCHLORIDE 0.2 MG: 0.2 INJECTION, SOLUTION INTRAMUSCULAR; INTRAVENOUS; SUBCUTANEOUS at 05:07

## 2023-06-09 ASSESSMENT — ACTIVITIES OF DAILY LIVING (ADL)
ADLS_ACUITY_SCORE: 20
DEPENDENT_IADLS:: INDEPENDENT
ADLS_ACUITY_SCORE: 23
ADLS_ACUITY_SCORE: 20
ADLS_ACUITY_SCORE: 20
ADLS_ACUITY_SCORE: 23
ADLS_ACUITY_SCORE: 20
ADLS_ACUITY_SCORE: 23
ADLS_ACUITY_SCORE: 20
ADLS_ACUITY_SCORE: 23
ADLS_ACUITY_SCORE: 20

## 2023-06-09 NOTE — PROGRESS NOTES
06/09/23 1324   Appointment Info   Signing Clinician's Name / Credentials (SLP) Estefani Zheng MA CCC-SLP   General Information   Onset of Illness/Injury or Date of Surgery 06/08/23   Referring Physician Mari Garcia MD   Patient/Family Therapy Goal Statement (SLP) None stated   Pertinent History of Current Problem Pt is a 37-year-old woman with a past medical history of prediabetes, depression, anxiety, and PCOS recently diagnosed with a 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 Clinical swallow eval completed per MD order.   General Observations Upon SLP arrival, pt positioned upright in bed, awake, and willing to participate. Per discussion w/ ENT, pt OK for full liquids at this time.   Type of Evaluation   Type of Evaluation Swallow Evaluation   Oral Motor   Oral Musculature anomalies present  (right partal glossectomy)   Structural Abnormalities none present   Mucosal Quality good   Dentition (Oral Motor)   Dentition (Oral Motor) natural dentition   Facial Symmetry (Oral Motor)   Facial Symmetry (Oral Motor) WNL   Lip Function (Oral Motor)   Lip Range of Motion (Oral Motor) WNL   Tongue Function (Oral Motor)   Tongue ROM (Oral Motor) depression is impaired;elevation is impaired;protrusion is impaired;lateralization is impaired;retraction is impaired   Lateralization, Tongue ROM Impairment (Oral Motor) bilateral;minimal impairment   Jaw Function (Oral Motor)   Jaw Function (Oral Motor) WNL   Cough/Swallow/Gag Reflex (Oral Motor)   Volitional Throat Clear/Cough (Oral Motor) reduced strength   Vocal Quality/Secretion Management (Oral Motor)   Vocal Quality (Oral Motor) WFL   General Swallowing Observations   Past History of Dysphagia No hx of dysphagia per pt or chart review.   Current Diet/Method of Nutritional Intake (General Swallowing Observations, NIS) full liquid  diet;nasogastric tube (NG)   Swallowing Evaluation Clinical swallow evaluation   Clinical Swallow Evaluation   Feeding Assistance no assistance needed   Clinical Swallow Evaluation Textures Trialed thin liquids;pureed   Clinical Swallow Eval: Thin Liquid Texture Trial   Mode of Presentation, Thin Liquids cup;self-fed   Volume of Liquid or Food Presented 4 oz   Oral Phase of Swallow effortful AP movement   Pharyngeal Phase of Swallow intact   Diagnostic Statement No overt s/sx of aspiration.   Clinical Swallow Evaluation: Puree Solid Texture Trial   Mode of Presentation, Puree spoon;self-fed   Volume of Puree Presented 2 oz   Oral Phase, Puree effortful AP movement   Pharyngeal Phase, Puree intact   Diagnostic Statement No overt s/sx of aspiration.   Esophageal Phase of Swallow   Patient reports or presents with symptoms of esophageal dysphagia No   Swallowing Recommendations   Diet Consistency Recommendations full liquid diet   Supervision Level for Intake patient independent   Medication Administration Recommendations, Swallowing (SLP) As tolerated   Instrumental Assessment Recommendations instrumental evaluation not recommended at this time   General Therapy Interventions   Planned Therapy Interventions Dysphagia Treatment   Dysphagia treatment Oropharyngeal exercise training;Modified diet education;Instruction of safe swallow strategies;Compensatory strategies for swallowing   Clinical Impression   Criteria for Skilled Therapeutic Interventions Met (SLP Eval) Yes, treatment indicated   SLP Diagnosis Mild oropharyngeal dysphagia   Problem List (SLP) Below baseline swallow mechanism   Risks & Benefits of therapy have been explained evaluation/treatment results reviewed;risks/benefits reviewed;care plan/treatment goals reviewed;current/potential barriers reviewed;participants voiced agreement with care plan;participants included;patient   Clinical Impression Comments Clinical swallow eval completed per MD order. Pt  presents w/ mild oropharyngeal dysphagia s/p right partal glossectomy. Oral mech exam remarkable for reduced cough strength and lateralization, elevation, depression, and protrusion of the tongue. Pt assessed w/ thin liquids via cup and puree. Oral phase remarkable for effortful A-P transfer. Pharyngeal phase unremarkable, no overt s/sx of aspiration across trials. Per ENT, recommend full liquid diet, meds OK as tolerated. Ensure pt is fully upright and alert, taking small sips/bites at a slow rate. SLP to follow.   SLP Total Evaluation Time   Eval: oral/pharyngeal swallow function, clinical swallow Minutes ([id number]) 14   SLP Discharge Planning   SLP Plan Diet tolerance/upgrade (check w/ ENT to determine when OK for solids)   SLP Discharge Recommendation home with outpatient speech therapy   SLP Rationale for DC Rec Dysphagia   SLP Brief overview of current status  Per ENT, recommend full liquid diet, meds OK as tolerated. Ensure pt is fully upright and alert, taking small sips/bites at a slow rate. SLP to follow.   Total Session Time   Total Session Time (sum of timed and untimed services) 19

## 2023-06-09 NOTE — CARE PLAN
Neuro: A&O x 4. Afebrile. Pleasant affect. Calls appropriately.   Cardiac: Denies dizziness, chest pain, or palpitations.   Respiratory: Sating well on RA. LS clear. Denies cough.   GI/: Good UOP. Denies nausea.   Endocrine: AC/HS Blood sugar checks with sliding scale insulin administration  Diet/Appetite: Patient tolerates liquid diet. Patient has been having Ice cream for meals    LDA: Patient has a patent PIV on the left infusing LR  Activity: Patient is a one person assist and able to make needs known  Pain: Patient complains of pain and received x 2 of dilaudid and x 2 of oxycodone for pain management.      Plan: Continue to monitor and alert team with any changes or concerns.

## 2023-06-09 NOTE — PROGRESS NOTES
"Otolaryngology - Head & Neck Surgery Progress Note  6/9/2023    S/Intvl: No acute events overnight. Afebrile and vitally stable on room air. Voiding, no bowel movement yet since surgery. She reports her pain is acceptably controlled, currently 5/10. Her neck and tongue are equally uncomfortable. She has noticed a sensation of her right tongue being very large in her mouth. Received multiple doses of IV Dilaudid overnight in addition to Tylenol and oxycodone. She has been drinking water and ate ice cream yesterday evening without difficulty.    O:  /61 (BP Location: Left arm, Patient Position: Semi-Chapman's, Cuff Size: Adult Large)   Pulse 57   Temp 98.1  F (36.7  C) (Axillary)   Resp 20   Ht 1.626 m (5' 4\")   Wt 103.6 kg (228 lb 6.3 oz)   LMP  (LMP Unknown)   SpO2 98%   Breastfeeding Yes   BMI 39.20 kg/m    General: Resting comfortably in bed, in no acute distress.  Face: Symmetric, no swelling, edema, or erythema, no facial droop.  Mouth: Moist mucous membranes, tongue midline, xeroform bolster in place and sutured to the right oral tongue defect, no evidence of purulence, progression of right tongue edema.  Neck: No palpable cervical LAD, trachea midline, right neck incision intact, PHILLIP drain x1 in place with appropriate serosanguinous output.  Respiratory: Breathing non-labored on RA, no stridor, no stertor, no accessory muscle use.     Intake/Output Summary (Last 24 hours) at 6/9/2023 0811  Last data filed at 6/9/2023 0635  Gross per 24 hour   Intake 2028.33 ml   Output 555 ml   Net 1473.33 ml     Drains:   Neck PHILLIP: 80/35    Labs:  BMP  Recent Labs   Lab 06/09/23  0627 06/08/23  2148 06/08/23  1753 06/08/23  0813     --   --   --    POTASSIUM 4.6  --   --   --    CHLORIDE 102  --   --   --    RY 9.0  --   --   --    CO2 27  --   --   --    BUN 12.3  --   --   --    CR 0.75  --   --   --    * 134* 140* 114*     CBC  Recent Labs   Lab 06/09/23  0627   WBC 10.5   RBC 3.97   HGB 11.6* "   HCT 35.6   MCV 90   MCH 29.2   MCHC 32.6   RDW 13.1        A/P: Kaya Wagner is a 37-year-old woman with a past medical history of prediabetes, depression, anxiety, and PCOS recently diagnosed with a 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    Neuro:  - Pain control: scheduled Tylenol, PRN oxycodone, PRN Dilaudid    HEENT:  - Wound cares: bactracin TID x24 hours, then Aquaphor TID thereafter for neck incision  - Intraoral bolster to remain in place  - Peridex TID  - Added Decadron 8 mg Q8H x3 doses given right tongue edema    Respiratory:  - Supplemental O2 PRN to keep sats >92%    CV:  - No issues    FEN/GI:  - Full liquid diet  - NG tube in place with AXR confirming gastric positioning, to leave in place for now pending oral intake  - Bowel regimen: senna-docusate BID, daily Miralax    :  - Voiding independently  - mIVF:  mL/hr, can discontinue when tolerating sufficient oral intake    Endo:  - Hx prediabetes  - Medium sliding scale insulin    ID:  - Peridex TID    Heme:  - LVX 40 mg daily    PPX:  - SCDs  - IS    Consults: OT, PT, SLP.    Dispo: To home pending control of pain on oral medications and sufficient oral intake (or conversion to tube feeds if unable to meet nutritional needs orally), likely 1-2 days.    Patient and above plan were discussed with staff surgeon, Dr. London.    Mari Garcia MD PGY-5  Otolaryngology - Head & Neck Surgery

## 2023-06-09 NOTE — CONSULTS
Care Management Initial Consult    General Information  Assessment completed with: Patient,    Type of CM/SW Visit: Initial Assessment    Primary Care Provider verified and updated as needed: Yes   Readmission within the last 30 days: no previous admission in last 30 days      Reason for Consult: discharge planning  Advance Care Planning:            Communication Assessment  Patient's communication style: spoken language (English or Bilingual)    Hearing Difficulty or Deaf: no   Wear Glasses or Blind: yes    Cognitive  Cognitive/Neuro/Behavioral: WDL  Level of Consciousness: alert  Arousal Level: opens eyes spontaneously  Orientation: oriented x 4     Best Language: 0 - No aphasia  Speech: garbled, spontaneous, logical    Living Environment:   People in home: child(eleuterio), dependent, spouse, other (see comments) (2 daughters , a 10 weeks old and 4 years old, her mom and her )  spouse, mom, 2 kids  Current living Arrangements: house      Able to return to prior arrangements: yes       Family/Social Support:  Care provided by: self  Provides care for: child(eleuterio) (dependants, 10 weeks old and a 4 years old)  Marital Status:   , Parent(s)  Dionisio       Description of Support System: Supportive, Involved    Support Assessment: Adequate family and caregiver support    Current Resources:   Patient receiving home care services: No     Community Resources:  none  Equipment currently used at home: none  Supplies currently used at home:  none    Employment/Financial:  Employment Status: employed full-time     Employment/ Comments:  is in   Financial Concerns: No concerns identified   Referral to Financial Worker: No       Does the patient's insurance plan have a 3 day qualifying hospital stay waiver?  No    Lifestyle & Psychosocial Needs:  Social Determinants of Health     Tobacco Use: Low Risk  (6/9/2023)    Patient History      Smoking Tobacco Use: Never      Smokeless Tobacco Use:  Never      Passive Exposure: Not on file   Alcohol Use: Not on file   Financial Resource Strain: Not on file   Food Insecurity: Not on file   Transportation Needs: Not on file   Physical Activity: Not on file   Stress: Not on file   Social Connections: Not on file   Intimate Partner Violence: Not on file   Depression: Not at risk (5/31/2023)    PHQ-2      PHQ-2 Score: 0   Housing Stability: Not on file       Functional Status:  Prior to admission patient needed assistance:   Dependent ADLs:: Independent  Dependent IADLs:: Independent       Mental Health Status:  Mental Health Status: Current Concern (PTSD, anxienty, depression,)  Mental Health Management: Individual Therapy, Other (see comment) (followed by [facility] Psychologist)    Chemical Dependency Status:  Chemical Dependency Status: No Current Concerns             Values/Beliefs:  Spiritual, Cultural Beliefs, Nondenominational Practices, Values that affect care: yes  Description of Beliefs that Will Affect Care: Andrey            Additional Information:    Met with patient today for initial assessment.   Patient is a 37 years old,  and has 2 daughters : a 10 weeks old and a 4 years old. Patient lives in a the [place] address in file  with her , her 2 daughters and her mom. So currently  and mom are taking care of the kids. Patient was independent with hr care and taking care of her 2 dependant daughters prior to admission. 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 She drives an I independent with mobility. Patient's  is in the  so insurance is [employer] insurance.  Patient said she has anxiety, depression, and PTSD. She is being followed by [facility] psychologist.Denied any substance abuse.    Patient's PCP is Storm Fitzpatrick MD from [facility]. Called patient registration and updated PCP.    RN CC will continue to follow up.    Jennifer Campbell RN, PHN, BSN   Float  Nurse Care Coordinator  Covering for [address]  Phone [phone]

## 2023-06-09 NOTE — PLAN OF CARE
Status: S/p Right partial glossectomy, nasogastric tube placement  right modfied radical neck dissection  Vitals: VSS, RA  Neuros: Intact.  IV: PIV infusing LR at 100 ml/hr.  Labs/Electrolytes: AM labs  Resp/trach: LS CTA, RA.  Diet: Tolerating clears, minimal po intake.   Bowel status: LBM 6/8. C/o nausea relieved with zofran. Passing flatus, BS +.  : Voids spon to b/r  Skin: R neck incision BRAVO, PHILLIP x1, R tongue incision. NG tube clamped.  Pain: C/o pain 4-8/10, prn IV dilaudid and oxycodone with relief.  Activity: SBA GB.   Social: Pt independently updating family. Assisted pt with breast pump set up, pt calling when needing to pump. Dumping breast milk d/t narcotic administration.  Plan: Continue current POC.

## 2023-06-09 NOTE — PLAN OF CARE
Physical Therapy: Orders received. Chart reviewed and discussed with care team.? Physical Therapy not indicated due to patient mobilizing independently. OT following and will assist with any discharge needs.? Defer discharge recommendations to OT and medical team.? Will complete orders.

## 2023-06-10 VITALS
OXYGEN SATURATION: 96 % | BODY MASS INDEX: 38.99 KG/M2 | DIASTOLIC BLOOD PRESSURE: 52 MMHG | RESPIRATION RATE: 16 BRPM | WEIGHT: 228.4 LBS | HEIGHT: 64 IN | TEMPERATURE: 97.9 F | HEART RATE: 50 BPM | SYSTOLIC BLOOD PRESSURE: 128 MMHG

## 2023-06-10 LAB
GLUCOSE BLDC GLUCOMTR-MCNC: 135 MG/DL (ref 70–99)
GLUCOSE BLDC GLUCOMTR-MCNC: 144 MG/DL (ref 70–99)
GLUCOSE BLDC GLUCOMTR-MCNC: 148 MG/DL (ref 70–99)

## 2023-06-10 PROCEDURE — 258N000003 HC RX IP 258 OP 636: Performed by: STUDENT IN AN ORGANIZED HEALTH CARE EDUCATION/TRAINING PROGRAM

## 2023-06-10 PROCEDURE — 250N000009 HC RX 250

## 2023-06-10 PROCEDURE — 250N000013 HC RX MED GY IP 250 OP 250 PS 637

## 2023-06-10 PROCEDURE — 250N000013 HC RX MED GY IP 250 OP 250 PS 637: Performed by: STUDENT IN AN ORGANIZED HEALTH CARE EDUCATION/TRAINING PROGRAM

## 2023-06-10 PROCEDURE — 250N000011 HC RX IP 250 OP 636: Performed by: STUDENT IN AN ORGANIZED HEALTH CARE EDUCATION/TRAINING PROGRAM

## 2023-06-10 RX ORDER — CHLORHEXIDINE GLUCONATE ORAL RINSE 1.2 MG/ML
15 SOLUTION DENTAL 3 TIMES DAILY
Qty: 473 ML | Refills: 0 | Status: SHIPPED | OUTPATIENT
Start: 2023-06-10 | End: 2024-02-18

## 2023-06-10 RX ORDER — AMOXICILLIN 250 MG
1 CAPSULE ORAL 2 TIMES DAILY
Qty: 50 TABLET | Refills: 0 | Status: SHIPPED | OUTPATIENT
Start: 2023-06-10 | End: 2024-02-18

## 2023-06-10 RX ORDER — MINERAL OIL/HYDROPHIL PETROLAT
OINTMENT (GRAM) TOPICAL 3 TIMES DAILY
Qty: 50 G | Refills: 0 | Status: SHIPPED | OUTPATIENT
Start: 2023-06-10 | End: 2024-02-18

## 2023-06-10 RX ORDER — OXYCODONE HYDROCHLORIDE 5 MG/1
5-10 TABLET ORAL EVERY 4 HOURS PRN
Qty: 40 TABLET | Refills: 0 | Status: SHIPPED | OUTPATIENT
Start: 2023-06-10 | End: 2024-02-18

## 2023-06-10 RX ORDER — POLYETHYLENE GLYCOL 3350 17 G/17G
17 POWDER, FOR SOLUTION ORAL DAILY
Qty: 510 G | Refills: 0 | Status: SHIPPED | OUTPATIENT
Start: 2023-06-10 | End: 2024-02-18

## 2023-06-10 RX ADMIN — WHITE PETROLATUM: 1.75 OINTMENT TOPICAL at 09:06

## 2023-06-10 RX ADMIN — ACETAMINOPHEN 975 MG: 325 TABLET, FILM COATED ORAL at 08:03

## 2023-06-10 RX ADMIN — TOPICAL ANESTHETIC 0.5 ML: 200 SPRAY DENTAL; PERIODONTAL at 04:33

## 2023-06-10 RX ADMIN — OXYCODONE HYDROCHLORIDE 10 MG: 10 TABLET ORAL at 13:16

## 2023-06-10 RX ADMIN — SODIUM CHLORIDE, POTASSIUM CHLORIDE, SODIUM LACTATE AND CALCIUM CHLORIDE: 600; 310; 30; 20 INJECTION, SOLUTION INTRAVENOUS at 08:01

## 2023-06-10 RX ADMIN — SENNOSIDES AND DOCUSATE SODIUM 1 TABLET: 50; 8.6 TABLET ORAL at 08:03

## 2023-06-10 RX ADMIN — OXYCODONE HYDROCHLORIDE 10 MG: 10 TABLET ORAL at 00:01

## 2023-06-10 RX ADMIN — ACETAMINOPHEN 975 MG: 325 TABLET, FILM COATED ORAL at 00:01

## 2023-06-10 RX ADMIN — OXYCODONE HYDROCHLORIDE 10 MG: 10 TABLET ORAL at 09:00

## 2023-06-10 RX ADMIN — OXYCODONE HYDROCHLORIDE 10 MG: 10 TABLET ORAL at 04:30

## 2023-06-10 RX ADMIN — DEXAMETHASONE SODIUM PHOSPHATE 8 MG: 4 INJECTION, SOLUTION INTRA-ARTICULAR; INTRALESIONAL; INTRAMUSCULAR; INTRAVENOUS; SOFT TISSUE at 00:03

## 2023-06-10 RX ADMIN — CHLORHEXIDINE GLUCONATE 15 ML: 1.2 SOLUTION ORAL at 08:04

## 2023-06-10 ASSESSMENT — ACTIVITIES OF DAILY LIVING (ADL)
ADLS_ACUITY_SCORE: 20

## 2023-06-10 NOTE — PLAN OF CARE
Status: POD#2 R partial glossectomy and NG tube placement  Vitals: VSS on RA   Neuros: Numbness to R tongue, otherwise intact.  IV: 1 PIV SL, 1 PIV infusing LR at 100 ml/hr   Labs/Electrolytes: ACHS BG checks  Resp/trach: LSC   Diet: Full liquid diet. Swallows whole pills one at a time.  Bowel status: LBM 6/8. Passing gas.  : Voiding spontaneously   Skin: R neck incision w/1 neck PHILLIP with 5ml serosanguinous output.   Pain: Tongue and neck pain managed with scheduled tylenol and PRN oxycodone. PRN Numbing spray given x1 for sore throat.   Activity: SBA, GB   Social: Pt uses breast pump, at bedside.   Plan: Continue to monitor and follow POC.

## 2023-06-10 NOTE — PLAN OF CARE
Status: S/p Right partial glossectomy, nasogastric tube placement, right modfied radical neck dissection 6/8.  Vitals: VSS, RA.  Neuros: Intact except R mouth numbness, unchanged.  IV: PIV removed at discharge.   Resp/trach: LS CTA.  Diet: Transitioned to pureed.   Bowel status: BS+, LBM 6/8.  : Voiding spontaneously.  Skin: R neck incision sutured, BRAVO/ R neck PHILLIP sutured in place. R tongue incision with bolster, WDL.   Pain: Managed with PRN oxycodone and scheduled tylenol.   Activity: Up ad radha.   Social: Family present for discharge.  Plan: Follow up with ENT outpatient. Appointment scheduled.   Discharge time/date: 1400 on Jazlyn 10, 2023  Walked or Wheelchair: Wheelchair.  PIV removed: Yes.  Reviewed AVS with patient: Yes. Reviewed with patient and family.  Medication due times added to AVS in EPIC: Yes.  Verbalized understanding of discharge with teachback: Yes.  Medications retrieved from pharmacy: Yes.  Supplies sent home: PHILLIP education handout, extra cups to collect PHILLIP drainage.   Belongings from security with patient: N/A.

## 2023-06-10 NOTE — PLAN OF CARE
Occupational Therapy Discharge Summary    Reason for therapy discharge:    Discharged to home.    Progress towards therapy goal(s). See goals on Care Plan in [facility] electronic health record for goal details.  Goals partially met.  Barriers to achieving goals:   discharge from facility.    Therapy recommendation(s):    Continue home exercise program.

## 2023-06-10 NOTE — DISCHARGE SUMMARY
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 A PET/CT was completed with no evidence of regional or distant metastatic disease. She was recommended to undergo surgical resection of her tumor with possible neck dissection given depth of invasion. She wished to proceed and underwent right partal glossectomy, right selective neck dissection levels 1B-4, and nasogastric tube placement on 6/8/2023.    Hospital Course: The patient was admitted to the hospital and underwent the above mentioned procedure. She tolerated the procedure without any intra- or denae-operative complications. Please see the operative report for full details of the procedure. The patient was admitted for post-operative monitoring. Her postoperative course was uneventful. Her pain was controlled on oral medications by the time of discharge. A Miguel-Downey drain was placed with appropriate instructions for care at home. Her local wound cares included Aquaphor for the incision and Peridex for oral rinses. Her cardiac and pulmonary status remained stable throughout hospitalization. A nasogastric tube was placed prophylactically but not required as she tolerated a full liquid diet throughout hospitalization. She was progressed to a pureed diet at discharge. She was voiding independently and discharged on an appropriate bowel regimen for the duration of her opioid usage. She was discharged to  home on POD #2 with appropriate follow-up in clinic scheduled for this coming week.    Discharge Exam:  Vitals:    06/09/23 0802 06/09/23 1500 06/09/23 2320 06/10/23 0723   BP: 125/62 120/59 122/63 128/52   BP Location: Left arm Left arm Left arm Left arm   Patient Position:       Cuff Size:       Pulse: 71 61 53 50   Resp: 20 16 16 16   Temp: 98  F (36.7  C) 97.7  F (36.5  C) 97.9  F (36.6  C) 97.9  F (36.6  C)   TempSrc: Axillary Axillary Axillary Axillary   SpO2: 95% 95% 94% 96%   Weight:       Height:         General: Resting comfortably in bed, in no acute distress.  Face: Symmetric, no swelling, edema, or erythema, no facial droop. NG tube removed at discharge.  Mouth: Moist mucous membranes, tongue midline, xeroform bolster in place and sutured to the right oral tongue defect, no evidence of purulence, significantly improved appearance of right tongue edema.  Neck: No palpable cervical LAD, trachea midline, right neck incision intact, PHILLIP drain x1 in place with appropriate minimal serosanguinous output.   Respiratory: Breathing non-labored on RA, no stridor, no stertor, no accessory muscle use.     Discharge Medications:     Medication List      Started    chlorhexidine 0.12 % solution  Commonly known as: PERIDEX  15 mLs, Swish & Spit, 3 TIMES DAILY     mineral oil-hydrophilic petrolatum external ointment  Topical, 3 TIMES DAILY     oxyCODONE 5 MG tablet  Commonly known as: ROXICODONE  5-10 mg, Oral, EVERY 4 HOURS PRN     polyethylene glycol 17 GM/Dose powder  Commonly known as: MIRALAX  17 g, Oral, DAILY     senna-docusate 8.6-50 MG tablet  Commonly known as: SENOKOT-S/PERICOLACE  1 tablet, Oral, 2 TIMES DAILY          Discharge Procedure Orders   Reason for your hospital stay   Order Comments: You were in the hospital to have surgery on your tongue and neck.     Activity   Order Comments: Your activity upon discharge: return to your regular activity as tolerated. Avoid strenuous activity or heavy lifting  greater than 15-20 pounds for two weeks after surgery.     Order Specific Question Answer Comments   Is discharge order? Yes      Adult [facility] Follow-up and recommended labs and tests   Order Comments: Follow-up with Dr. London on 6/14/2023 as scheduled. If your drain is putting out less than 30 mL/day prior to this clinic visit, you may call our clinic to schedule a nursing visit to get it removed during the week. Please call the clinic with any questions/concerns: [phone].    Otolaryngology/ENT Clinic:  [facility]  [address] [place], [place] [id number]     Tubes and drains   Order Comments: You are going home with the following tubes or drains: right neck PHILLIP drain. Two times daily, strip the drain tubing by holding it in both hands and pulling your hands away from each other to milk fluid towards the bulb. Take the bulb off suction by opening the stopper and empty the fluid. Measure this fluid and record the output, then it may be disposed of in a sink or the toilet. Squeeze the bulb and replace the stopper to put the bulb back on suction. When the output is less than 30 mL in 24 hours, please call our clinic to get this drain removed.     Wound care and dressings   Order Comments: Instructions to care for your wound at home: continue to use the Aquaphor ointment three times daily to the neck incision. Continue to use the prescribed Peridex mouth rinse three times daily. The bolster will remain in place on the right side of your tongue until you see Dr. London in clinic on Wednesday.     Diet   Order Comments: Follow this diet upon discharge: you may transition from a full liquid diet to a pureed diet on discharge. Pureed foods do not require any chewing and include foods such as yogurt, soups/broths, nutrition shakes, pureed fruits and vegetables, cooked cereals, mashed potatoes, popsicles, and ice cream.     Order Specific Question Answer  Comments   Is discharge order? Yes      Dispo: To home in good condition. All of the patient's questions/concerns have been addressed at this time.     Mari Garcia MD PGY-5  Otolaryngology-Head & Neck Surgery  Please contact ENT by dialing [phone] and entering job code [phone].

## 2023-06-10 NOTE — PLAN OF CARE
Status: POD 1 R partial glossectomy and NG tube placement  Vitals: VSS on RA   Neuros: Intact   IV: 1 PIV SL, 1 PIV infusing LR at 100 ml/hr   Labs/Electrolytes: BG checks   Resp/trach: LSC   Diet: Full liquid diet, fair intake. Tolerating PO meds.   Bowel status: LBM 6/8, bowel meds given   : Voiding spontaneously   Skin: Numbness to R tongue incision. R neck incision WDL. Neck PHILLIP with serosanguinous output.   Pain: Tongue and neck pain managed with scheduled tylenol and prn oxycodone. Numbing spray ordered for sore throat.   Activity: SBA GB   Social: Family visited this evening   Plan: Pt uses breast pump, at bedside.

## 2023-06-11 NOTE — CARE PLAN
Last refill Clarification on Rx.   Last office visit 1/9/23    Please resend this RX with Clarification of directions.   Script set to eprescribe  per protocol.      Patient takes:  6am 100mg   12pm 200mg  6pm 100mg    Midnight 300mg   Speech Language Therapy Discharge Summary    Reason for therapy discharge:    Discharged to home with outpatient therapy.    Progress towards therapy goal(s). See goals on Care Plan in [facility] electronic health record for goal details.  Goals not met.  Barriers to achieving goals:   discharge from facility.    Therapy recommendation(s):    Per ENT, recommend full liquid diet, meds OK as tolerated. Ensure pt is fully upright and alert, taking small sips/bites at a slow rate. Pt to follow-up at ENT clinic.

## 2023-06-12 ENCOUNTER — ALLIED HEALTH/NURSE VISIT (OUTPATIENT)
Dept: OTOLARYNGOLOGY | Facility: CLINIC | Age: 38
End: 2023-06-12
Payer: OTHER GOVERNMENT

## 2023-06-12 ENCOUNTER — NURSE TRIAGE (OUTPATIENT)
Dept: NURSING | Facility: CLINIC | Age: 38
End: 2023-06-12
Payer: OTHER GOVERNMENT

## 2023-06-12 DIAGNOSIS — C02.9 TONGUE CANCER (H): Primary | ICD-10-CM

## 2023-06-12 PROCEDURE — 99024 POSTOP FOLLOW-UP VISIT: CPT | Performed by: STUDENT IN AN ORGANIZED HEALTH CARE EDUCATION/TRAINING PROGRAM

## 2023-06-12 RX ORDER — PREDNISONE 5 MG/ML
20 SOLUTION ORAL DAILY
Status: DISCONTINUED | OUTPATIENT
Start: 2023-06-12 | End: 2023-06-12

## 2023-06-12 RX ORDER — OXYCODONE HCL 5 MG/5 ML
5-10 SOLUTION, ORAL ORAL EVERY 4 HOURS PRN
Qty: 500 ML | Refills: 0 | Status: SHIPPED | OUTPATIENT
Start: 2023-06-12 | End: 2023-06-15

## 2023-06-12 RX ORDER — ACETAMINOPHEN 160 MG/5ML
1000 LIQUID ORAL EVERY 4 HOURS PRN
Qty: 1000 ML | Refills: 3 | Status: SHIPPED | OUTPATIENT
Start: 2023-06-12 | End: 2024-08-21

## 2023-06-12 NOTE — TELEPHONE ENCOUNTER
"    Reason for Disposition    Sounds like a serious complication to the triager    Additional Information    Negative: Sounds like a life-threatening emergency to the triager    Negative: Chest pain    Negative: Difficulty breathing    Negative: Acting confused (e.g., disoriented, slurred speech) or excessively sleepy    Negative: Surgical incision symptoms and questions    Negative: Pain or burning with passing urine (urination) and male    Negative: Pain or burning with passing urine (urination) and female    Negative: Constipation    Negative: New or worsening leg (calf, thigh) pain    Negative: New or worsening leg swelling    Negative: Dizziness is severe, or persists > 24 hours after surgery    Negative: Symptoms arising from use of a urinary catheter (Thorne or Coude)    Negative: Cast problems or questions    Negative: Medication question    Negative: Bright red, wide-spread, sunburn-like rash    Negative: SEVERE headache and after spinal (epidural) anesthesia    Negative: Vomiting and abdomen looks much more swollen than usual    Negative: Vomiting and persists > 4 hours    Negative: Drinking very little and dehydration suspected (e.g., no urine > 12 hours, very dry mouth, very lightheaded)    Negative: Patient sounds very sick or weak to the triager    Answer Assessment - Initial Assessment Questions  1. SYMPTOM: \"What's the main symptom you're concerned about?\" (e.g., pain, fever, vomiting)   tonque swollen. Thicker, dry mouth, hard to swallow, decreased appetite  2. ONSET: \"When did *No Answer*  start?\"   yesterday mid day  3. SURGERY: \"What surgery did you have?\"   mass removed from tonque  4. DATE of SURGERY: \"When was the surgery?\"   Thursday June 8  5. ANESTHESIA: \" What type of anesthesia did you have?\" (e.g., general, spinal, epidural, local)     unknown  6. PAIN: \"Is there any pain?\" If Yes, ask: \"How bad is it?\"  (Scale 1-10; or mild, moderate, severe)      8/10 taking tylenol and oxycodone last " "dose 4 am  7. FEVER: \"Do you have a fever?\" If Yes, ask: \"What is your temperature, how was it measured, and when did it start?\"    98.6   8:30 am  8. VOMITING: \"Is there any vomiting?\" If Yes, ask: \"How many times?\"    none  9. BLEEDING: \"Is there any bleeding?\" If Yes, ask: \"How much?\" and \"Where?\"none    *No Answer*  10. OTHER SYMPTOMS: \"Do you have any other symptoms?\" (e.g., drainage from wound, painful urination, constipation)    no    Protocols used: POST-OP SYMPTOMS AND [id number]      "

## 2023-06-12 NOTE — NURSING NOTE
Patient in clinic today for PHILLIP drain removal. Drain output in last 24 hours was 10 ml, indicating PHILLIP removal as it was less than 30 ml in 24 hours. Incision site evaluated and it was clean, dry and intact without evidence of redness, swelling or drainage. PHILLIP site was cleansed, suture around tubing was removed, drain bulb was opened, and drain was removed easily. Patient educated on signs and symptoms of infection, site care and provided number to call with further questions or concerns. Patient verbalized understanding and was encouraged to call with any further questions or concerns    Shivani Montalvo, RN, BSN

## 2023-06-12 NOTE — TELEPHONE ENCOUNTER
Called and spoke with patient regarding her symptoms. Patient will return to clinic today for PHILLIP drain removal and we will evaluate her tongue at the same time.     Patient and spouse agreeable.     Shivani Montalvo, RN, BSN

## 2023-06-13 LAB
PATH REPORT.COMMENTS IMP SPEC: ABNORMAL
PATH REPORT.COMMENTS IMP SPEC: ABNORMAL
PATH REPORT.COMMENTS IMP SPEC: YES
PATH REPORT.FINAL DX SPEC: ABNORMAL
PATH REPORT.GROSS SPEC: ABNORMAL
PATH REPORT.INTRAOP OBS SPEC DOC: ABNORMAL
PATH REPORT.MICROSCOPIC SPEC OTHER STN: ABNORMAL
PATH REPORT.RELEVANT HX SPEC: ABNORMAL
PATHOLOGY SYNOPTIC REPORT: ABNORMAL
PHOTO IMAGE: ABNORMAL

## 2023-06-14 ENCOUNTER — THERAPY VISIT (OUTPATIENT)
Dept: SPEECH THERAPY | Facility: CLINIC | Age: 38
End: 2023-06-14
Payer: OTHER GOVERNMENT

## 2023-06-14 ENCOUNTER — PATIENT OUTREACH (OUTPATIENT)
Dept: ONCOLOGY | Facility: CLINIC | Age: 38
End: 2023-06-14

## 2023-06-14 ENCOUNTER — OFFICE VISIT (OUTPATIENT)
Dept: OTOLARYNGOLOGY | Facility: CLINIC | Age: 38
End: 2023-06-14
Payer: OTHER GOVERNMENT

## 2023-06-14 VITALS
SYSTOLIC BLOOD PRESSURE: 133 MMHG | HEIGHT: 64 IN | DIASTOLIC BLOOD PRESSURE: 79 MMHG | HEART RATE: 72 BPM | BODY MASS INDEX: 37.05 KG/M2 | TEMPERATURE: 97.8 F | WEIGHT: 217 LBS | OXYGEN SATURATION: 95 %

## 2023-06-14 DIAGNOSIS — C06.9 SQUAMOUS CELL CARCINOMA OF ORAL CAVITY (H): ICD-10-CM

## 2023-06-14 DIAGNOSIS — C02.9 PRIMARY SQUAMOUS CELL CARCINOMA OF TONGUE (H): Primary | ICD-10-CM

## 2023-06-14 DIAGNOSIS — R13.11 ORAL PHASE DYSPHAGIA: Primary | ICD-10-CM

## 2023-06-14 DIAGNOSIS — C02.9 TONGUE CANCER (H): ICD-10-CM

## 2023-06-14 PROCEDURE — 92610 EVALUATE SWALLOWING FUNCTION: CPT | Mod: GN | Performed by: SPEECH-LANGUAGE PATHOLOGIST

## 2023-06-14 PROCEDURE — 99024 POSTOP FOLLOW-UP VISIT: CPT | Performed by: OTOLARYNGOLOGY

## 2023-06-14 ASSESSMENT — PAIN SCALES - GENERAL: PAINLEVEL: MILD PAIN (2)

## 2023-06-14 NOTE — NURSING NOTE
"Chief Complaint   Patient presents with     RECHECK     1 week post op      Blood pressure 133/79, pulse 72, temperature 97.8  F (36.6  C), height 1.626 m (5' 4\"), weight 98.4 kg (217 lb), SpO2 95 %, currently breastfeeding.    Jonathan Moses LPN    "

## 2023-06-14 NOTE — PATIENT INSTRUCTIONS
Continue with liquids trying to use protein drinks to help get enough nutrition    Ok to start taking purees (applesauce, pudding, yogurt, mashed potatoes, baby food texture)   -place spoon on the left side of the mouth   -take a spoonful and swallow it down and then follow it up with a sip to help wash it down    Try to time eating/drinking 20-30 min after taking a dose of pain medication      Next appointment:  Tuesday 6/20 at 2:30pm, 4th floor [facility]      MALLIKA Brasher (carlos), MA, CCC-SLP   Speech Language Pathologist  [employer] Trained Vocologist   [facility]  Dept. of Otolaryngology  Department of Rehabilitation Services  [address] [place] [id number]  Email: [email or web address]   Phone: [phone]  Pronouns: she/her/hers

## 2023-06-14 NOTE — PROGRESS NOTES
"SPEECH LANGUAGE PATHOLOGY EVALUATION    Subjective   Presenting condition or subjective complaint: Pt presents today in conjunction with ENT clinic visit and is seen per MD request for dysphagia. Per MD, she will likely need post op XRT.   Date of onset: 06/08/23 (date of surgery)    Relevant medical history: Prediabetes, depression, anxiety, PCOS  Dates & types of surgery: Right partial glossectomy, right modified radical neck dissection on 6/8/23    Prior therapy history for the same diagnosis, illness or injury: Seen by IP SLP team with recommendation for full liquid diet, meds as tolerated.    Living Environment  Social support: , who is present today        Patient goals for therapy: To swallow without pain    Pain assessment: pt endorses mouth/tongue pain     Objective     SWALLOW EVALUTION  Dysphagia history: Pt reports she has been on a full liquid diet using Premier protein shakes to help maintain caloric intake. She denies coughing/TC and feeling of stasis. She is taking pills with thin liquid without difficulty. Endorses odynophagia and tongue pain. She tried some purees after coming home from the hospital, but reports \"it was too much and hurt too much\" so she hasn't tried these anymore.    Current Diet/Method of Nutritional Intake: full liquid diet        CLINICAL SWALLOW EVALUATION  Oral Motor Function: Dentition: natural dentition  Secretion management: WFL  Mucosal quality: adequate  Mandibular function: impaired likely d/t pain  Oral labial function: WFL  Lingual function: impaired strength and ROM  Velar function: intact   Buccal function: intact  Laryngeal function: cough, volitional swallow, voicing WFL     Level of assist required for feeding: no assistance needed   Textures Trialed:   Clinical Swallow Eval: Thin Liquids  Mode of presentation: cup, self-fed   Volume presented: 3oz  Preparatory Phase: WFL  Oral Phase: WFL  Pharyngeal phase of swallow: intact    Diagnostic statement: No " clinical s/sx of penetration/aspiration. Pt endorses odynophagia.    Clinical Swallow Eval: Purees  Mode of presentation: spoon, self-fed   Volume presented: 2 tablespoons; pt denied further trials d/t pain  Preparatory Phase: WFL  Oral Phase: pt benefited from placing spoon on the left side   Pharyngeal phase of swallow: feeling of something stuck in the mouth and throat   Strategies trialed during procedure: [facility]WARD SLP CLINICAL EVAL STRATEGIES: liquid wash   Diagnostic statement: No clinical s/sx of penetration/aspiration. Pt endorsed mild feeling of oral and pharyngeal stasis relieved with a liquid wash.     ESOPHAGEAL PHASE OF SWALLOW  no observed or reported concerns related to esophageal function     SWALLOW ASSESSMENT CLINICAL IMPRESSIONS AND RATIONALE  Diet Consistency Recommendations: thin liquids (level 0), pureed (level 4)    Recommended Feeding/Eating Techniques: place food on left side of mouth, slow rate of intake, alternate food and liquid intake, time PO after pain medication  Medication Administration Recommendations: whole with thin liquid  Instrumental Assessment Recommendations: instrumental evaluation not recommended at this time     Assessment & Plan   CLINICAL IMPRESSIONS   Medical Diagnosis: Squamous cell carcinoma of oral cavity    Treatment Diagnosis: Mild oral dysphagia   Impression/Assessment: Pt is a 37 year old female presenting today with mild oral dysphagia s/p partial glossectomy complaints. Identified deficits interfere with their ability to consume an oral diet and maintain nutrition as compared to previous level of function. Recommend diet advancement to purees and thin liquids.     PLAN OF CARE  Treatment Interventions:  Swallowing dysfunction and/or oral function for feeding    Prognosis to achieve stated therapy goals is good   Rehab potential is impacted by: pending medical intervention    Long Term Goals   SLP Goal 1  Goal Identifier: PO  Goal Description: 1. Pt will tolerate  regular, moist textures and thin liquids with no overt clinical s/sx of penetration/aspiration in 100% of PO trials.  Rationale: To maximize safety, ease and/or independence of oral intake  Target Date: 09/12/23  SLP Goal 2  Goal Identifier: Exercise  Goal Description: 2. Pt will maximize swallow function by completing 10 reps of 5/5 lingual strengthening/ROM exercises daily with 100% accuracy.  Rationale: To maximize safety, ease and/or independence of oral intake  Target Date: 09/12/23      Frequency of Treatment: 2x/month  Duration of Treatment: 3 months     Education Assessment:   Learner/Method: Patient;Significant Other    Risks and benefits of evaluation/treatment have been explained.   Patient/Family/caregiver agrees with Plan of Care.     Evaluation Time:     SLP Eval: oral/pharyngeal swallow function, clinical minutes (25840): 20      Signing Clinician: Christina Morrison SLP

## 2023-06-14 NOTE — LETTER
6/14/2023       RE: Kaya Wagner  [address] [facility] [address]     Dear Colleague,    Thank you for referring your patient, Kaya Wagner, to the [facility] [place] at [facility]. Please see a copy of my visit note below.    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  Margins were negative but there was small nerve perineural invasion present.    INTERVAL HISTORY: The patient is been doing fairly well but still has the bolster and she has been taking clear liquids and full liquids.  She has been using Peridex as well which she says has not been burning too much    PHYSICAL EXAMINATION: On examination, the bolster was removed which revealed the raw surface of the resected tongue on the right side.  It looks to be granulating as expected.  Her neck incision also looks excellent and the sutures were removed.    IMPRESSION: Doing well    PLAN: The patient will require postoperative radiation therapy, we will set up the appointment for this.  She also will be seen by speech speech and swallowing therapy to start advancing her diet.  I will see her back in about 6 weeks.    Jabier London M.D.

## 2023-06-15 NOTE — PROGRESS NOTES
New Patient Oncology Nurse Navigator Note     Referring provider: Dr. Jabier London     Referring Clinic/Organization: [facility]     Referred to (specialty): Radiation Oncology    Requested provider (if applicable): [facility] [place]: [phone]      Date Referral Received: 2023     Evaluation for : SCC base of tongue     Clinical History (per Nurse review of records provided):    **BOOK MARKED**   NOTES:  2023:  ENT follow up w/Dr. London and SLP  2023:  OP Note  2023:  ENT tumor board discussion    IMAGIN2023:  CT c/a/p & PET CT  2023:  CT Soft Tissue Neck    PATHOLOGY:  2023   Final Diagnosis  A. ORAL CAVITY, RIGHT PARTIAL GLOSSECTOMY:  - INVASIVE KERATINIZING SQUAMOUS CELL CARCINOMA, MODERATELY DIFFERENTIATED  - Tumor size: 2.0 cm x DOI: 11 mm  - Lymphovascular invasion is not identified  - Perineural invasion is present, focal (small nerves)  - Postero-lateral resection margin involved by carcinoma  - All other margins are negative for carcinoma (closest deep margin is 1mm)  - AJCC Pathologic Stage: pT3, N0     B. ADDITIONAL DEEP MARGIN:  - Negative for carcinoma     C. LYMPH NODE, RIGHT LEVEL 1B, EXCISION:  - Five lymph nodes, negative for carcinoma (0/5)  - Benign salivary gland tissue     D. LYMPH NODE, RIGHT LEVEL 2A, EXCISION:  - Thirteen lymph nodes, negative for carcinoma (0/13)     E. LYMPH NODE, RIGHT LEVEL 3, EXCISION:  -Eleven lymph nodes, negative for carcinoma (0/11)     F. LYMPH NODE, RIGHT LEVEL 4, EXCISION:  - Fourteen lymph nodes, negative for carcinoma (0/14)     G. LYMPH NODE, RIGHT LEVEL 2B, EXCISION:  - Two lymph nodes, negative for carcinoma (0/2)     H. POSTERIOR LATERAL, RERESECTION:  - Negative for high grade dysplasia/carcinoma     I. POSTERIOR LATERAL MARGIN #2:  - Negative for high grade dysplasia/carcinoma     Electronically signed by Cece Velez MD on 2023 at 11:30       Clinical Assessment /  Barriers to Care (Per Nurse): none noted       Records Location (Care Everywhere, Media, etc.): EPIC     Records Needed: none     Additional testing needed prior to consult: none

## 2023-06-17 NOTE — PROGRESS NOTES
Head and Neck Surgery  6/12/23    She is POD 4 from partial glossectomy and neck dissection with Dr London. Came in today for drain removal and concerns regarding tongue swelling/pain. I was asked to see her.    They noticed significant tongue swelling yesterday that has improved this am. No respiratory complaints. Has not been eating. Using pain meds regularly.     On exam, anticipated post op tongue swelling. Bolster in place. No fluid collections in neck.     A/P:  Doing as expected after surgery. We will transition her medications to liquids to improve tolerance. We will give her a few days of steroids which may help the swelling. She will continue to follow up with Dr London.     Ang Raya MD

## 2023-06-20 ENCOUNTER — THERAPY VISIT (OUTPATIENT)
Dept: SPEECH THERAPY | Facility: CLINIC | Age: 38
End: 2023-06-20
Payer: OTHER GOVERNMENT

## 2023-06-20 DIAGNOSIS — C06.9 SQUAMOUS CELL CARCINOMA OF ORAL CAVITY (H): ICD-10-CM

## 2023-06-20 DIAGNOSIS — R13.11 ORAL PHASE DYSPHAGIA: Primary | ICD-10-CM

## 2023-06-20 PROCEDURE — 92526 ORAL FUNCTION THERAPY: CPT | Mod: GN | Performed by: SPEECH-LANGUAGE PATHOLOGIST

## 2023-06-21 DIAGNOSIS — C02.9 PRIMARY SQUAMOUS CELL CARCINOMA OF TONGUE (H): Primary | ICD-10-CM

## 2023-06-21 NOTE — PROGRESS NOTES
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  Margins were negative but there was small nerve perineural invasion present.    INTERVAL HISTORY: The patient is been doing fairly well but still has the bolster and she has been taking clear liquids and full liquids.  She has been using Peridex as well which she says has not been burning too much    PHYSICAL EXAMINATION: On examination, the bolster was removed which revealed the raw surface of the resected tongue on the right side.  It looks to be granulating as expected.  Her neck incision also looks excellent and the sutures were removed.    IMPRESSION: Doing well    PLAN: The patient will require postoperative radiation therapy, we will set up the appointment for this.  She also will be seen by speech speech and swallowing therapy to start advancing her diet.  I will see her back in about 6 weeks.    Jabier London M.D.

## 2023-06-22 NOTE — TELEPHONE ENCOUNTER
RECORDS STATUS - ALL OTHER DIAGNOSIS      RECORDS RECEIVED FROM: Epic   DATE RECEIVED:    NOTES STATUS DETAILS   OFFICE NOTE from referring provider Jabier Kolb MD: 6/14/23   DISCHARGE SUMMARY from hospital [facility] 6/8/23   OPERATIVE REPORT Epic 6/8/23: Partial Glossectomy   MEDICATION LIST [facility]    LABS     PATHOLOGY REPORTS [facility]/ - [facility]  6/8/23: Surg Path  Path Consult  5/25/23:  Briseida  5/12/23: [id number]  --------------------------------------------------    Briseida  5/5/23: [id number]   ANYTHING RELATED TO DIAGNOSIS Epic 6/10/23   IMAGING (NEED IMAGES & REPORT)     CT SCANS PACS Epic   XR PACS Epic   PET PACS Epic

## 2023-06-23 ENCOUNTER — TUMOR CONFERENCE (OUTPATIENT)
Dept: ONCOLOGY | Facility: CLINIC | Age: 38
End: 2023-06-23
Payer: OTHER GOVERNMENT

## 2023-06-23 ENCOUNTER — ANCILLARY PROCEDURE (OUTPATIENT)
Dept: GENERAL RADIOLOGY | Facility: CLINIC | Age: 38
End: 2023-06-23
Payer: OTHER GOVERNMENT

## 2023-06-23 ENCOUNTER — THERAPY VISIT (OUTPATIENT)
Dept: SPEECH THERAPY | Facility: CLINIC | Age: 38
End: 2023-06-23
Payer: OTHER GOVERNMENT

## 2023-06-23 DIAGNOSIS — C02.9 PRIMARY SQUAMOUS CELL CARCINOMA OF TONGUE (H): ICD-10-CM

## 2023-06-23 DIAGNOSIS — R13.11 ORAL PHASE DYSPHAGIA: Primary | ICD-10-CM

## 2023-06-23 PROCEDURE — 74230 X-RAY XM SWLNG FUNCJ C+: CPT | Mod: GC | Performed by: RADIOLOGY

## 2023-06-23 PROCEDURE — 92611 MOTION FLUOROSCOPY/SWALLOW: CPT | Mod: GN | Performed by: SPEECH-LANGUAGE PATHOLOGIST

## 2023-06-23 PROCEDURE — 92526 ORAL FUNCTION THERAPY: CPT | Mod: GN | Performed by: SPEECH-LANGUAGE PATHOLOGIST

## 2023-06-23 RX ORDER — BARIUM SULFATE 400 MG/ML
SUSPENSION ORAL ONCE
Status: COMPLETED | OUTPATIENT
Start: 2023-06-23 | End: 2023-06-23

## 2023-06-23 RX ADMIN — BARIUM SULFATE: 400 SUSPENSION ORAL at 13:30

## 2023-06-23 NOTE — PROGRESS NOTES
SPEECH LANGUAGE PATHOLOGY EVALUATION    See electronic medical record for Abuse and Falls Screening details.    Subjective      Presenting condition or subjective complaint:   Pt presents today for video swallow study baseline exam prior to onset of radiation therapy.  Date of onset: 06/08/23 (date of surgery)    Relevant medical history:   Kaya Wagner is a 37-year-old woman who underwent surgery for a T3N0 SCC of the right tongue. She was recommended to undergo post-operative radiation. She was seen for clinical swallow evaluation on 6/14/23 and cleared for soft foods.   Dates & types of surgery:  6/8/23: Right partal glossectomy, right modified radical neck dissection    Prior diagnostic imaging/testing results:     Clinical swallow evaluation, no previous swallowing exams.   Prior therapy history for the same diagnosis, illness or injury:    No previous speech therapy reported.     Living Environment  Social support:   Lives at home with her  and children.     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  Current Diet/Method of Nutritional Intake: thin liquids (level 0), soft & bite-sized (level 6)        VIDEOFLUOROSCOPIC SWALLOW STUDY  Radiologist: Resident  Views Taken: left lateral, A/P   Physical location of procedure: [facility]  Patient sitting upright on chair/stool     VFSS textures trialed:   VFSS Eval: Thin Liquids  Mode of Presentation: cup, self-fed   Order of Presentation: series 1, 2, 9, 12 (A/P)  Preparatory Phase: WFL  Oral Phase: WFL  Bolus Location When Swallow Initiated: valleculae  Pharyngeal Phase: WFL  Rosenbeck's Penetration Aspiration Scale: 2 - contrast enters airway, remains above the vocal  cords, no residue remains (penetration)  Diagnostic Statement: Premature pharyngeal entry noted with flash penetration.  The penetrated material is cleared with the force of the swallow.  No pharyngeal residue after the completed swallow.    VFSS Eval: Mildly Thick Liquids  Mode of Presentation: cup, self-fed   Order of Presentation: Series 3, 4  Preparatory Phase: WFL  Oral Phase: WFL  Bolus Location When Swallow Initiated: valleculae  Pharyngeal Phase: WFL  Rosenbeck's Penetration Aspiration Scale: 1 - no aspiration, contrast does not enter airway  Diagnostic Statement: No aspiration/penetration noted on mildly thick liquid trials.  No pharyngeal residue after the completed swallow.  Adequate hyolaryngeal excursion noted.  Complete epiglottic inversion demonstrated.    VFSS Eval: Purees  Mode of Presentation: spoon, self-fed   Order of Presentation: series 5, 6, 11 (A/P)  Preparatory Phase: prolonged bolus preparation, poor bolus control  Oral Phase: impaired AP movement  Bolus Location When Swallow Initiated: valleculae  Pharyngeal Phase: WFL  Rosenbeck s Penetration Aspiration Scale: 1 - no aspiration, contrast does not enter airway  Diagnostic Statement: No aspiration/penetration noted on purée consistency trial.  Pharyngeal phase of the swallow is intact.  Patient demonstrates prolonged oral phase with decreased oral bolus cohesion due to lingual deficit.    VFSS Eval: Solids  Mode of Presentation: self-fed   Order of Presentation: series 7, 8  Preparatory Phase: prolonged bolus preparation, insufficient mastication  Oral Phase: impaired AP movement, residue in oral cavity  Bolus Location When Swallow Initiated: posterior angle of ramus  Pharyngeal Phase: WFL   Rosenbeck s Penetration Aspiration Scale: 1 - no aspiration, contrast does not enter airway  Diagnostic Statement: No aspiration/penetration noted on solid consistency trials.  Patient demonstrates decreased bolus cohesion due to lingual deficits.   Trace residue noted on the palate after the first swallow.  Patient is able to clear this with subsequent dry swallow.    VFSS Eval: Barium Tablet  Mode of Presentation: self-fed   Order of Presentation: series 10  Preparatory Phase: WFL  Oral Phase: WFL  Bolus Location When Swallow Initiated: posterior angle of ramus  Pharyngeal Phase: WFL  Rosenbeck s Penetration Aspiration Scale: 1 - no aspiration, contrast does not enter airway  Diagnostic Statement: Barium tablet passed through oral and pharyngeal phases easily.     ESOPHAGEAL PHASE OF SWALLOW  esophageal sweep performed during today's videofluoroscopic exam  with adequate bolus transit noted     SWALLOW ASSESSMENT CLINICAL IMPRESSIONS AND RATIONALE  Diet Consistency Recommendations: thin liquids (level 0), soft & bite-sized (level 6)  (medically cleared for soft foods)   Recommended Feeding/Eating Techniques: slow rate of intake, alternate food and liquid intake, effortful (hard) swallow, check mouth for oral residue/pocketing   Medication Administration Recommendations: Whole with liquid  Instrumental Assessment Recommendations: Repeat video swallow study if significant deficits reported after completion of radiation therapy     Assessment & Plan   CLINICAL IMPRESSIONS   Medical Diagnosis: Squamous cell carcinoma of oral cavity    Treatment Diagnosis: Oral dysphagia   Impression/Assessment: Pt is a 37 year old female with dysphagia complaints. The following significant findings have been identified: impaired swallowing, characterized by decreased oral bolus cohesion.  Patient also at high risk for radiation-induced fibrosis of the swallow musculature.  Identified deficits interfere with their ability to consume an oral diet as compared to previous level of function.    PLAN OF CARE  Treatment Interventions:  Swallowing dysfunction and/or oral function for feeding    Prognosis to achieve stated therapy goals is good   Rehab potential is impacted by: radiation  therapy    Long Term Goals   SLP Goal 1  Goal Identifier: PO  Goal Description: Pt will tolerate least restricitive diet while adhering to safe swallow precautions and without pulmonary compromise across 6 months time.  Rationale: To maximize safety, ease and/or independence of oral intake  Target Date: 09/21/23  SLP Goal 2  Goal Identifier: Exercise  Goal Description: Pt will improve and/or maintain ROM and strength of BOT, jaw and pharynx by completing 10 repetitions of 5/5 exercises 3 times daily with minimal written or verbal cues.  Rationale: To maximize safety, ease and/or independence of oral intake  Target Date: 09/21/23      Frequency of Treatment: once weekly  Duration of Treatment: 3 months     Recommended Referrals to Other Professionals: Dietician  Education Assessment:   Learner/Method: Patient    Risks and benefits of evaluation/treatment have been explained.   Patient/Family/caregiver agrees with Plan of Care.     Evaluation Time:     SLP Eval: VideoFluoroscopic Swallow function Minutes (50017): 15     Present: Not applicable     Signing Clinician: Dona Prabhakar SLP

## 2023-06-23 NOTE — TUMOR CONFERENCE
Head & Neck Tumor Conference Note         Status: established  Staff: Dr. London     Tumor Site: Right lateral tongue   Tumor Pathology: SCC   Tumor Stage: pT3N0  Tumor Treatment:   23 Partial glossectomy, R MRND      Reason for Review: Review imaging, path, and POC     Brief History: Ms. Wagner is a 37-year-old female who presents for evaluation of a right tongue squamous cell carcinoma.  She first noticed a sore on her right lateral tongue when she was 3 months pregnant, approximately 6 months ago.  She was observed and this lesion was monitored for her pregnancy however it did not resolve.  She notes that it has not grown much however she is noticed some pain that is bothersome at times.  She was then referred to [facility] who performed 2 biopsies of the lesion the initial being fibroma and the second being positive for invasive squamous cell carcinoma.  She was referred to Dr. Huggins however due to insurance limitations she was referred to the [facility].  She has not noticed any neck lumps or bumps or any tongue movement abnormalities.  She does feel her speech is somewhat affected due to pain in her tongue.  Of note she has had numerous canker sores in this area in the past however she has had no other symptoms.  She is a non-smoker rarely drinks alcohol. She underwent partial glossectomy with a neck dissection last week and has pathology available for review.      PMH: Prediabetes, depression, anxiety, PCOS  PSH:  section  Family history: No family history of head or neck cancer     Pertinent PMH:   Past Medical History   No past medical history on file.         Smoking Hx:   Social History           Tobacco Use     Smoking status: Never     Smokeless tobacco: Never      Imaging:   CT Neck   FINDINGS: Evaluation of the oral cavity and floor of mouth is limited  by metallic artifact from the patient's dental amalgam. There is a  questionable soft tissue density mass obscuring the  right  glossotonsillar sulcus measuring roughly 1.9 x 2.0 cm in size that  could represent the tongue lesion in question. Biopsy note describes a  lesion of the right posterolateral tongue.     There are no definite enlarged lymph nodes in the neck on either side.  Scattered mildly prominent suprahyoid lymph nodes on both sides of the  neck are noted but are morphologically within normal limits in  contour.     No evidence for mucosal space lesion other than the aforementioned  right posterolateral tongue lesion. The salivary glands are  unremarkable. The thyroid gland is within normal limits. The lung  apices are clear. No sinusitis or mastoiditis.                                                                      IMPRESSION:  1. Abnormally prominent soft tissue obscuring the glossotonsillar  sulcus on the right measuring roughly 1.9 x 2.0 cm in size. This may  correspond to the biopsied tongue lesion.  2. No other mucosal space abnormalities.  3. No definite cervical lymphadenopathy.  4. Otherwise, normal soft tissue neck CT.        Radiation dose for this scan was reduced using automated exposure  control, adjustment of the mA and/or kV according to patient size, or  iterative reconstruction technique.     DEVIN MORGAN MD      Pathology:   A. ORAL CAVITY, RIGHT PARTIAL GLOSSECTOMY:  - INVASIVE KERATINIZING SQUAMOUS CELL CARCINOMA, MODERATELY DIFFERENTIATED  - Tumor size: 2.0 cm x DOI: 11 mm  - Lymphovascular invasion is not identified  - Perineural invasion is present, focal (small nerves)  - Postero-lateral resection margin involved by carcinoma  - All other margins are negative for carcinoma (closest deep margin is 1mm)  - AJCC Pathologic Stage: pT3, N0     B. ADDITIONAL DEEP MARGIN:  - Negative for carcinoma     C. LYMPH NODE, RIGHT LEVEL 1B, EXCISION:  - Five lymph nodes, negative for carcinoma (0/5)  - Benign salivary gland tissue     D. LYMPH NODE, RIGHT LEVEL 2A, EXCISION:  - Thirteen lymph nodes,  negative for carcinoma (0/13)     E. LYMPH NODE, RIGHT LEVEL 3, EXCISION:  -Eleven lymph nodes, negative for carcinoma (0/11)     F. LYMPH NODE, RIGHT LEVEL 4, EXCISION:  - Fourteen lymph nodes, negative for carcinoma (0/14)     G. LYMPH NODE, RIGHT LEVEL 2B, EXCISION:  - Two lymph nodes, negative for carcinoma (0/2)     H. POSTERIOR LATERAL, RERESECTION:  - Negative for high grade dysplasia/carcinoma     I. POSTERIOR LATERAL MARGIN #2:  - Negative for high grade dysplasia/carcinoma     Tumor Board Recommendation:   Discussion: Pathology reveals invasive carcinoma with moderate differentiation. PNI is focal on small nerves, on the main specimen the posterolateral margin was intially positive then reresected and cleared.      Plan:   Adjuvant radiation       Chani Reynoso MD PGY-3  Otolaryngology- Head and Neck Surgery     Documentation / Disclaimer Cancer Tumor Board Note  Cancer tumor board recommendations do not override what is determined to be reasonable care and treatment, which is dependent on the circumstances of a patient's case; the patient's medical, social, and personal concerns; and the clinical judgment of the oncologist [physician].

## 2023-06-23 NOTE — TUMOR CONFERENCE
Called and spoke with patient regarding the following pathology results:      Final Diagnosis  A. ORAL CAVITY, RIGHT PARTIAL GLOSSECTOMY:  - INVASIVE KERATINIZING SQUAMOUS CELL CARCINOMA, MODERATELY DIFFERENTIATED  - Tumor size: 2.0 cm x DOI: 11 mm  - Lymphovascular invasion is not identified  - Perineural invasion is present, focal (small nerves)  - Postero-lateral resection margin involved by carcinoma  - All other margins are negative for carcinoma (closest deep margin is 1mm)  - AJCC Pathologic Stage: pT3, N0  B. ADDITIONAL DEEP MARGIN:  - Negative for carcinoma  C. LYMPH NODE, RIGHT LEVEL 1B, EXCISION:  - Five lymph nodes, negative for carcinoma (0/5)  - Benign salivary gland tissue  D. LYMPH NODE, RIGHT LEVEL 2A, EXCISION:  - Thirteen lymph nodes, negative for carcinoma (0/13)  E. LYMPH NODE, RIGHT LEVEL 3, EXCISION:  -Eleven lymph nodes, negative for carcinoma (0/11)  F. LYMPH NODE, RIGHT LEVEL 4, EXCISION:  - Fourteen lymph nodes, negative for carcinoma (0/14)  G. LYMPH NODE, RIGHT LEVEL 2B, EXCISION:  - Two lymph nodes, negative for carcinoma (0/2)      Reviewed that recommendation to proceed with post op radiation. Patient in agreement. She is scheduled to see radiation on Wednesday 6/28 and already saw dental yesterday. She has 2 cavities to be filled but not additional dental work needed.     Patient will return for follow-up exam with Dr. London next week as scheduled.     Patient encouraged to call with further questions or concerns.       Shivani Montalvo, RN, BSN

## 2023-06-24 NOTE — PROGRESS NOTES
RADIATION ONCOLOGY CONSULTATION  DATE OF VISIT: 2023    Name: Kaya Wagner MRN: [id number]   : 1985   Date of Service: 2023 Referring: Dr. London     Reason for consultation: Squamous cell carcinoma of the oral cavity    History of Present Illness   Ms. Wagner is a 37 year old female with a PMH of prediabetes, depression/anxiety, PCOS who presents to the radiation oncology clinic to discuss radiotherapy for squamous cell carcinoma of the tongue, T3 (DOI>1 cm: DOI 11 mm) N0 M0.    Oncologic History:  2023, CT neck:    Soft tissue density obscuring the right glossotonsillar sulcus measuring 1.9 x 2 cm    No definite enlarged lymph nodes on either side of the neck    2023, surgical pathology:    Tongue biopsy: Basosquamous cell carcinoma with focal keratinization.  Depth of invasion: At least 2.2 mm.    2023, initial consultation with Dr. Lnodon:     Patient first noticed a sore on the right lateral tongue on 2022.  Occurred when patient was 3 months pregnant.  Lesion monitored during pregnancy and never resolved.  Patient feels that speech is somewhat affected from tongue pain.  Initially referred to an outside ENT but due to insurance limitations, subsequently referred to [facility].  Patient is a non-smoker.  Rarely drinks alcohol.    Physical exam: Right posterolateral tongue lesion with roughly 1.5 cm shallow ulceration.  Tender to palpation.  No jaw or tooth tenderness.  Tongue midline and symmetric.  Does not extend into the posterior oropharynx.  Oropharynx is WNL.  No oropharyngeal erythema.    2023, PET/CT:    Asymmetric hypermetabolic area on the right posterolateral tongue.  1.6 cm.  SUV max 12.9.      Extends to right retromolar trigone.  SUV max 7.7.    No cervical lymphadenopathy    No evidence of distant metastatic disease    2023: Patient undergoes right partial glossectomy & right modified radical neck dissection of levels 1B-4    2023,  surgical pathology:    Right partial glossectomy: Invasive keratinizing squamous cell carcinoma, moderately differentiated.  2 cm in greatest dimension.  Depth of invasion: 11 mm.  No LVSI.  Perineural invasion present.  Posterolateral resection margin involved by carcinoma.  All other margins negative for carcinoma, closest deep margin is 1 mm.  PT3N0    Right level 1B, 2A, 2B, 3, 4 all negative for carcinoma.    Posterolateral margin reresection: Negative for carcinoma    Posterolateral margin negative for carcinoma    2023, video swallow test: No tracheal aspiration identified    2023, ENT tumor conference: Patient notices sore on the right lateral tongue around 2022.  This occurred when patient was 3 months pregnant.  The lesion was monitored during pregnancy and never resolved.  Patient feels that her speech is somewhat affected due to tongue pain.      Consensus: Adjuvant radiation    On interview, the patient was accompanied by her  Dionisio.  The patient reports that she is recovering well from surgery.  She has not been on any pain meds for over a week.  She does report some tenderness over her right neck.  The patient is scheduled to have 2 fillings performed with a dentist on 2023, and she is aware of needing trays and guards which she plans to get done this week as expeditiously as she can.  The patient continues to work with speech therapy.  The patient and her  live in [place].  They have 3-month-old  at home in addition to a 4-year-old child.  Dionisio has been working remotely for his job, which is located in [place], to be at home to help care for his wife. Dionisio did specify that his employer has been supportive of his remote work, but he did ask if the department could provide a letter to corroborate that he is working remotely and help support his wife through head and neck surgery and future radiotherapy.    Past Medical History:  Past Medical History:    Diagnosis Date     Squamous cell cancer of tongue (H)      Past Surgical History:  Past Surgical History:   Procedure Laterality Date     AS ESOPHAGOSCOPY, DIAGNOSTIC        SECTION       DISSECTION RADICAL NECK MODIFIED Right 2023    Procedure: right modfied radical neck dissection;  Surgeon: Jabier London MD;  Location: [facility] OR     GLOSSECTOMY PARTIAL Right 2023    Procedure: Right partial glossectomy, nasogastric tube placement;  Surgeon: Jabier London MD;  Location: [facility] OR     Chemotherapy History: None    Radiation History: None    Pregnant: No    Implanted Cardiac Devices: None    Autoimmune History: None    Medications:  Current Outpatient Medications   Medication     acetaminophen (TYLENOL) 160 MG/5ML solution     chlorhexidine (PERIDEX) 0.12 % solution     famotidine (PEPCID) 20 MG tablet     metFORMIN (GLUCOPHAGE) 1000 MG tablet     mineral oil-hydrophilic petrolatum (AQUAPHOR) external ointment     oxyCODONE (ROXICODONE) 5 MG tablet     polyethylene glycol (MIRALAX) 17 GM/Dose powder     senna-docusate (SENOKOT-S/PERICOLACE) 8.6-50 MG tablet     sertraline (ZOLOFT) 50 MG tablet     No current facility-administered medications for this visit.     Allergies:  Allergies   Allergen Reactions     Dust Mites      Gluten Meal Diarrhea, Nausea, Nausea and Vomiting and Other (See Comments)     Vomiting and diarrhea       Maple Tree Unknown     Pollen Extract      Social History:  Social History     Socioeconomic History     Marital status:      Spouse name: Not on file     Number of children: Not on file     Years of education: Not on file     Highest education level: Not on file   Occupational History     Not on file   Tobacco Use     Smoking status: Never     Smokeless tobacco: Never   Substance and Sexual Activity     Alcohol use: Not Currently     Drug use: Never     Sexual activity: Not on file   Other Topics Concern     Not on file   Social History Narrative     Not on file      Social Determinants of Health     Financial Resource Strain: Not on file   Food Insecurity: Not on file   Transportation Needs: Not on file   Physical Activity: Not on file   Stress: Not on file   Social Connections: Not on file   Intimate Partner Violence: Not on file   Housing Stability: Not on file     Family History:  History reviewed. No pertinent family history.     Review of Systems   A 12-point review of systems was performed. Pertinent findings are noted in the HPI.    Physical Exam   ECOG Status: 0    Vitals:  Wt 99.3 kg (219 lb)   LMP  (LMP Unknown)   BMI 37.59 kg/m      Gen: Alert, in no acute distress, very mild dysarthria  HEENT: Oral cavity with well-healed right partial glossectomy resection, no mucosal lesions noted, tongue protrudes past incisors  Neck: Healing surgical incision, no adenopathy or nodularity  Pulm: No wheezing, stridor or respiratory distress  CV: Extremities are warm and well-perfused, no cyanosis, no pedal edema  Musculoskeletal: Normal bulk and tone  Skin: Normal color and turgor  Neuro: A/Ox3, CN II-XII intact, normal gait    Imaging/Path/Labs   Imaging: Please refer to history of present illness    Path: Please refer to history of present illness    Assessment    Ms. Wagner is a 37 year old female with a PMH of prediabetes, depression/anxiety, PCOS who presents to the Radiation Oncology clinic to discuss adjuvant radiotherapy for squamous cell carcinoma of the tongue, T3 (DOI>1 cm: DOI 11 mm) N0 M0.    Plan   1.  We recommend adjuvant radiotherapy to the primary tumor and involved lymph node levels in the head and neck.    2.  Because the patient is now postoperative with an R0 resection, we would recommend treating with 60 Gray in 30 fractions (200 cGy/fraction) to the operative bed.  Because the depth of invasion is 11 mm (e.g. greater than 1 cm), we also recommend treating bilateral ramona levels 1B-4.    3.  We discussed in detail the natural history of oropharyngeal  cancer    5. Side effects of radiation therapy include, but are not limited to, dry mouth, changes in taste sensation, dysphagia that may require a feeding tube, voice changes such as hoarseness, mucositis, skin changes including blisters, bleeding, infection, fibrosis and stiffness in the head and neck, lymphedema, hypothyroidism, dental complications including caries and compromised oral cavity vascular supply, and rare risks such as osteoradionecrosis, injury to the spinal cord or other nerves or second malignancy. The patient is aware that the side effects of radiation therapy may be severe and permanent, although we expect that such risks would be low and that they are outweighed by the benefit of treatment.  Patient was given the opportunity to ask questions, which were answered.     Patient was seen and discussed with my attending physician, Dr. Dias.    Twin Hoover MD, MS PGY-2  PGY-2 Radiation Oncology  Department of Radiation Oncology  [facility]  Phone: [phone]    ATTENDING ATTESTATION  Ms. Wagner was seen and examined by me. Note above by Dr. Hoover was reviewed and edited by me and reflects our mutual findings and plan of care.  Kaya Wagner is a 37-year-old woman with new diagnosis of pT3 pN0 cM0 squamous cell carcinoma of the right lateral oral tongue.  Pathology shows tumor size measuring 2.0 cm in maximum dimension with depth of invasion 11 mm.  There was no lymphovascular invasion but perineural invasion was present involving small nerves.  The posterior lateral resection margin was involved by carcinoma although reresection was negative for carcinoma.  A total of 45 lymph nodes from right levels 1B- 4 were negative for metastatic disease.    Given tumor depth of invasion and perineural invasion, she is considered at increased risk for local regional recurrence.  Because depth of invasion was 11 mm, this places her contralateral neck at increased risk for  ramona recurrence.  We reviewed the rationale for recommending adjuvant radiation as well as the anticipated course of therapy, expected acute toxicities, potential long-term risks and expected outcome from treatment.  The patient and her  asked several questions which were answered such that they verbalized understanding of the issues.  Informed consent was obtained.    We will schedule treatment planning simulation once she is cleared with the dentist.    She is not breast-feeding.  She was prescribed gabapentin to start on the first day of radiation with dose taper up to 900 mg 3 times daily as part of a pain control regimen.  We discussed holding off placing a feeding tube until such time as she may need one.    We appreciate the opportunity to participate in Ms. Wagner's care.  Please do not hesitate to contact me should you have any questions regarding her visit with us today.    105 minutes spent by me on the date of the encounter doing chart review, history and exam, documentation and further activities per the note.    Mari Dias MD  Department of Radiation Oncology  [facility]    CC  Patient Care Team:  Storm Fitzpatrick MD as PCP - General (Family Medicine)  Jabier London MD as Assigned Surgical Provider  Mari Dias MD as MD (Radiation Oncology)  Jabier London MD as MD (Otolaryngology)

## 2023-06-28 ENCOUNTER — THERAPY VISIT (OUTPATIENT)
Dept: SPEECH THERAPY | Facility: CLINIC | Age: 38
End: 2023-06-28
Payer: OTHER GOVERNMENT

## 2023-06-28 ENCOUNTER — OFFICE VISIT (OUTPATIENT)
Dept: OTOLARYNGOLOGY | Facility: CLINIC | Age: 38
End: 2023-06-28
Payer: OTHER GOVERNMENT

## 2023-06-28 ENCOUNTER — OFFICE VISIT (OUTPATIENT)
Dept: RADIATION ONCOLOGY | Facility: CLINIC | Age: 38
End: 2023-06-28
Attending: OTOLARYNGOLOGY
Payer: OTHER GOVERNMENT

## 2023-06-28 ENCOUNTER — PRE VISIT (OUTPATIENT)
Dept: RADIATION ONCOLOGY | Facility: CLINIC | Age: 38
End: 2023-06-28

## 2023-06-28 VITALS — BODY MASS INDEX: 37.59 KG/M2 | WEIGHT: 219 LBS

## 2023-06-28 VITALS
SYSTOLIC BLOOD PRESSURE: 128 MMHG | OXYGEN SATURATION: 98 % | DIASTOLIC BLOOD PRESSURE: 69 MMHG | HEART RATE: 51 BPM | WEIGHT: 218 LBS | HEIGHT: 64 IN | BODY MASS INDEX: 37.22 KG/M2

## 2023-06-28 DIAGNOSIS — R13.11 ORAL PHASE DYSPHAGIA: ICD-10-CM

## 2023-06-28 DIAGNOSIS — C02.9 PRIMARY SQUAMOUS CELL CARCINOMA OF TONGUE (H): ICD-10-CM

## 2023-06-28 DIAGNOSIS — C02.9 PRIMARY SQUAMOUS CELL CARCINOMA OF TONGUE (H): Primary | ICD-10-CM

## 2023-06-28 DIAGNOSIS — C06.9 SQUAMOUS CELL CARCINOMA OF ORAL CAVITY (H): ICD-10-CM

## 2023-06-28 PROCEDURE — 99205 OFFICE O/P NEW HI 60 MIN: CPT | Mod: GC | Performed by: RADIOLOGY

## 2023-06-28 PROCEDURE — G2212 PROLONG OUTPT/OFFICE VIS: HCPCS | Performed by: RADIOLOGY

## 2023-06-28 PROCEDURE — 99024 POSTOP FOLLOW-UP VISIT: CPT | Mod: GC | Performed by: OTOLARYNGOLOGY

## 2023-06-28 PROCEDURE — G0463 HOSPITAL OUTPT CLINIC VISIT: HCPCS | Performed by: RADIOLOGY

## 2023-06-28 PROCEDURE — 92526 ORAL FUNCTION THERAPY: CPT | Mod: GN | Performed by: SPEECH-LANGUAGE PATHOLOGIST

## 2023-06-28 RX ORDER — GABAPENTIN 300 MG/1
300 CAPSULE ORAL 3 TIMES DAILY
Qty: 270 CAPSULE | Refills: 4 | Status: SHIPPED | OUTPATIENT
Start: 2023-06-28 | End: 2024-02-18

## 2023-06-28 ASSESSMENT — ENCOUNTER SYMPTOMS
CONSTITUTIONAL NEGATIVE: 1
GASTROINTESTINAL NEGATIVE: 1
CARDIOVASCULAR NEGATIVE: 1
NEUROLOGICAL NEGATIVE: 1
NERVOUS/ANXIOUS: 1
DEPRESSION: 1
RESPIRATORY NEGATIVE: 1
MUSCULOSKELETAL NEGATIVE: 1
EYES NEGATIVE: 1

## 2023-06-28 ASSESSMENT — PAIN SCALES - GENERAL: PAINLEVEL: NO PAIN (0)

## 2023-06-28 NOTE — PROGRESS NOTES
Head and Neck Cancer Clinic Progress Note  June 28, 2023    Tumor Site: Right lateral tongue  Tumor Pathology: SCC   Tumor Stage: pT3N0  Tumor Treatment:   6/8/23 - Partial glossectomy, R MRND (IB-IV)     Brief History: Ms. Wagner is a 37-year-old female with a history of a right tongue squamous cell carcinoma.  She first noticed a sore on her right lateral tongue when she was 3 months pregnant, approximately 6 months ago. She was observed and this lesion was monitored for her pregnancy however it did not resolve. She underwent partial glossectomy with b/l neck dissection on 6/8and has pathology available for review.     Subjective/Intvl events: No issues since prior visit. Soft diet intake is going well.    Pathology:   A. ORAL CAVITY, RIGHT PARTIAL GLOSSECTOMY:  - INVASIVE KERATINIZING SQUAMOUS CELL CARCINOMA, MODERATELY DIFFERENTIATED  - Tumor size: 2.0 cm x DOI: 11 mm  - Lymphovascular invasion is not identified  - Perineural invasion is present, focal (small nerves)  - Postero-lateral resection margin involved by carcinoma  - All other margins are negative for carcinoma (closest deep margin is 1mm)  - AJCC Pathologic Stage: pT3, N0     B. ADDITIONAL DEEP MARGIN:  - Negative for carcinoma     C. LYMPH NODE, RIGHT LEVEL 1B, EXCISION:  - Five lymph nodes, negative for carcinoma (0/5)  - Benign salivary gland tissue     D. LYMPH NODE, RIGHT LEVEL 2A, EXCISION:  - Thirteen lymph nodes, negative for carcinoma (0/13)     E. LYMPH NODE, RIGHT LEVEL 3, EXCISION:  -Eleven lymph nodes, negative for carcinoma (0/11)     F. LYMPH NODE, RIGHT LEVEL 4, EXCISION:  - Fourteen lymph nodes, negative for carcinoma (0/14)     G. LYMPH NODE, RIGHT LEVEL 2B, EXCISION:  - Two lymph nodes, negative for carcinoma (0/2)     H. POSTERIOR LATERAL, RERESECTION:  - Negative for high grade dysplasia/carcinoma     I. POSTERIOR LATERAL MARGIN #2:  - Negative for high grade dysplasia/carcinoma    O: /69 (BP Location: Right arm, Patient  "Position: Sitting, Cuff Size: Adult Large)   Pulse 51   Ht 1.626 m (5' 4\")   Wt 98.9 kg (218 lb)   LMP  (LMP Unknown)   SpO2 98%   BMI 37.42 kg/m    General: Alert and oriented x 3, No acute distress. Accompanied.  Oral cavity: Mucosa moist. Right tongue raw surface appears well-healed. Neck: incision well-healed with one small vicryl stitch tail protruding; this was cut.  Pulmonary: Breathing non-labored, no stridor, no accessory muscle use.    A/P: Kaya Wagner is a 37 year old female with a past medical history of pT3N0 right tongue SCC now s/p wide local excision with right MRND (IB-IV). We will see her back in 2-3 months after the completion of radiation therapy, or earlier if any issues arise.    - RTC in 2-3 months after completion of radiation    -- Patient and above plan discussed with Dr. Surya Triplett MD  Otolaryngology-Head & Neck Surgery PGY3      I, Jabier London MD, saw this patient with the resident/fellow and agree with the resident's findings and plan of care as documented in the resident's/fellow's note.      "

## 2023-06-28 NOTE — PROGRESS NOTES
HPI    INITIAL PATIENT ASSESSMENT    Diagnosis: Cancer    Prior radiation therapy: None    Prior chemotherapy: None    Prior hormonal therapy:No    Pain Eval:  Denies    Psychosocial  Living arrangements: Lives with  with children  Fall Risk: independent   referral needs: Not needed    Advanced Directive: No  Implantable Cardiac Device? No      Review of Systems   Constitutional: Negative.    HENT: Negative.    Eyes: Negative.    Respiratory: Negative.    Cardiovascular: Negative.    Gastrointestinal: Negative.    Genitourinary: Negative.    Musculoskeletal: Negative.    Skin: Negative.    Neurological: Negative.    Endo/Heme/Allergies: Negative.    Psychiatric/Behavioral: Positive for depression. The patient is nervous/anxious.

## 2023-06-28 NOTE — PATIENT INSTRUCTIONS
1. Please follow-up in clinic after completion of radiation.   2. Please call the ENT clinic with any questions,concerns, new or worsening symptoms.    -Clinic number is [phone]   - Shivani's direct line (Dr. London's nurse) [phone]

## 2023-06-28 NOTE — NURSING NOTE
"Chief Complaint   Patient presents with     RECHECK     2 week follow up       Blood pressure 128/69, pulse 51, height 1.626 m (5' 4\"), weight 98.9 kg (218 lb), SpO2 98 %, currently breastfeeding.    Susan Quintana, EMT    "

## 2023-06-28 NOTE — LETTER
2023         RE: Kaya Wagner  [address] [facility] [place] [address]        Dear Colleague,    Thank you for referring your patient, Kaay Wagner, to the [facility] RADIATION ONCOLOGY. Please see a copy of my visit note below.    RADIATION ONCOLOGY CONSULTATION  DATE OF VISIT: 2023    Name: Kaya Wagner MRN: [id number]   : 1985   Date of Service: 2023 Referring: Dr. London     Reason for consultation: Squamous cell carcinoma of the oral cavity    History of Present Illness   Ms. Wagner is a 37 year old female with a PMH of prediabetes, depression/anxiety, PCOS who presents to the radiation oncology clinic to discuss radiotherapy for squamous cell carcinoma of the tongue, T3 (DOI>1 cm: DOI 11 mm) N0 M0.    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  Depth of invasion: At least 2.2 mm.    2023, initial consultation with Dr. London:   Patient first noticed a sore on the right lateral tongue on 2022.  Occurred when patient was 3 months pregnant.  Lesion monitored during pregnancy and never resolved.  Patient feels that speech is somewhat affected from tongue pain.  Initially referred to an outside ENT but due to insurance limitations, subsequently referred to [facility].  Patient is a non-smoker.  Rarely drinks alcohol.  Physical exam: Right posterolateral tongue lesion with roughly 1.5 cm shallow ulceration.  Tender to palpation.  No jaw or tooth tenderness.  Tongue midline and symmetric.  Does not extend into the posterior oropharynx.  Oropharynx is WNL.  No oropharyngeal erythema.    2023, PET/CT:  Asymmetric hypermetabolic area on the right posterolateral tongue.  1.6 cm.  SUV max 12.9.    Extends to  right retromolar trigone.  SUV max 7.7.  No cervical lymphadenopathy  No evidence of distant metastatic disease    2023: Patient undergoes right partial glossectomy & right modified radical neck dissection of levels 1B-4    2023, surgical pathology:  Right partial glossectomy: Invasive keratinizing squamous cell carcinoma, moderately differentiated.  2 cm in greatest dimension.  Depth of invasion: 11 mm.  No LVSI.  Perineural invasion present.  Posterolateral resection margin involved by carcinoma.  All other margins negative for carcinoma, closest deep margin is 1 mm.  PT3N0  Right level 1B, 2A, 2B, 3, 4 all negative for carcinoma.  Posterolateral margin reresection: Negative for carcinoma  Posterolateral margin negative for carcinoma    2023, video swallow test: No tracheal aspiration identified    2023, ENT tumor conference: Patient notices sore on the right lateral tongue around 2022.  This occurred when patient was 3 months pregnant.  The lesion was monitored during pregnancy and never resolved.  Patient feels that her speech is somewhat affected due to tongue pain.    Consensus: Adjuvant radiation    On interview, the patient was accompanied by her  Dionisio.  The patient reports that she is recovering well from surgery.  She has not been on any pain meds for over a week.  She does report some tenderness over her right neck.  The patient is scheduled to have 2 fillings performed with a dentist on 2023, and she is aware of needing trays and guards which she plans to get done this week as expeditiously as she can.  The patient continues to work with speech therapy.  The patient and her  live in [place].  They have 3-month-old  at home in addition to a 4-year-old child.  Dionisio has been working remotely for his job, which is located in [place], to be at home to help care for his wife. Dionisio did specify that his employer has been supportive of his remote work,  but he did ask if the department could provide a letter to corroborate that he is working remotely and help support his wife through head and neck surgery and future radiotherapy.    Past Medical History:  Past Medical History:   Diagnosis Date    Squamous cell cancer of tongue (H)      Past Surgical History:  Past Surgical History:   Procedure Laterality Date    AS ESOPHAGOSCOPY, DIAGNOSTIC       SECTION      DISSECTION RADICAL NECK MODIFIED Right 2023    Procedure: right modfied radical neck dissection;  Surgeon: Jabier London MD;  Location: [facility] OR    GLOSSECTOMY PARTIAL Right 2023    Procedure: Right partial glossectomy, nasogastric tube placement;  Surgeon: Jabier London MD;  Location: [facility] OR     Chemotherapy History: None    Radiation History: None    Pregnant: No    Implanted Cardiac Devices: None    Autoimmune History: None    Medications:  Current Outpatient Medications   Medication    acetaminophen (TYLENOL) 160 MG/5ML solution    chlorhexidine (PERIDEX) 0.12 % solution    famotidine (PEPCID) 20 MG tablet    metFORMIN (GLUCOPHAGE) 1000 MG tablet    mineral oil-hydrophilic petrolatum (AQUAPHOR) external ointment    oxyCODONE (ROXICODONE) 5 MG tablet    polyethylene glycol (MIRALAX) 17 GM/Dose powder    senna-docusate (SENOKOT-S/PERICOLACE) 8.6-50 MG tablet    sertraline (ZOLOFT) 50 MG tablet     No current facility-administered medications for this visit.     Allergies:  Allergies   Allergen Reactions    Dust Mites     Gluten Meal Diarrhea, Nausea, Nausea and Vomiting and Other (See Comments)     Vomiting and diarrhea      Maple Tree Unknown    Pollen Extract      Social History:  Social History     Socioeconomic History    Marital status:      Spouse name: Not on file    Number of children: Not on file    Years of education: Not on file    Highest education level: Not on file   Occupational History    Not on file   Tobacco Use    Smoking status: Never    Smokeless tobacco:  Never   Substance and Sexual Activity    Alcohol use: Not Currently    Drug use: Never    Sexual activity: Not on file   Other Topics Concern    Not on file   Social History Narrative    Not on file     Social Determinants of Health     Financial Resource Strain: Not on file   Food Insecurity: Not on file   Transportation Needs: Not on file   Physical Activity: Not on file   Stress: Not on file   Social Connections: Not on file   Intimate Partner Violence: Not on file   Housing Stability: Not on file     Family History:  History reviewed. No pertinent family history.     Review of Systems   A 12-point review of systems was performed. Pertinent findings are noted in the HPI.    Physical Exam   ECOG Status: 0    Vitals:  Wt 99.3 kg (219 lb)   LMP  (LMP Unknown)   BMI 37.59 kg/m      Gen: Alert, in no acute distress, very mild dysarthria  HEENT: Oral cavity with well-healed right partial glossectomy resection, no mucosal lesions noted, tongue protrudes past incisors  Neck: Healing surgical incision, no adenopathy or nodularity  Pulm: No wheezing, stridor or respiratory distress  CV: Extremities are warm and well-perfused, no cyanosis, no pedal edema  Musculoskeletal: Normal bulk and tone  Skin: Normal color and turgor  Neuro: A/Ox3, CN II-XII intact, normal gait    Imaging/Path/Labs   Imaging: Please refer to history of present illness    Path: Please refer to history of present illness    Assessment    Ms. Wagner is a 37 year old female with a PMH of prediabetes, depression/anxiety, PCOS who presents to the Radiation Oncology clinic to discuss adjuvant radiotherapy for squamous cell carcinoma of the tongue, T3 (DOI>1 cm: DOI 11 mm) N0 M0.    Plan   1.  We recommend adjuvant radiotherapy to the primary tumor and involved lymph node levels in the head and neck.    2.  Because the patient is now postoperative with an R0 resection, we would recommend treating with 60 Gray in 30 fractions (200 cGy/fraction) to the  operative bed.  Because the depth of invasion is 11 mm (e.g. greater than 1 cm), we also recommend treating bilateral ramona levels 1B-4.    3.  We discussed in detail the natural history of oropharyngeal cancer    5. Side effects of radiation therapy include, but are not limited to, dry mouth, changes in taste sensation, dysphagia that may require a feeding tube, voice changes such as hoarseness, mucositis, skin changes including blisters, bleeding, infection, fibrosis and stiffness in the head and neck, lymphedema, hypothyroidism, dental complications including caries and compromised oral cavity vascular supply, and rare risks such as osteoradionecrosis, injury to the spinal cord or other nerves or second malignancy. The patient is aware that the side effects of radiation therapy may be severe and permanent, although we expect that such risks would be low and that they are outweighed by the benefit of treatment.  Patient was given the opportunity to ask questions, which were answered.     Patient was seen and discussed with my attending physician, Dr. Dias.    Twin Hoover MD, MS PGY-2  PGY-2 Radiation Oncology  Department of Radiation Oncology  [facility]  Phone: [phone]    ATTENDING ATTESTATION  Ms. Wagner was seen and examined by me. Note above by Dr. Hoover was reviewed and edited by me and reflects our mutual findings and plan of care.  Kaya Wagner is a 37-year-old woman with new diagnosis of pT3 pN0 cM0 squamous cell carcinoma of the right lateral oral tongue.  Pathology shows tumor size measuring 2.0 cm in maximum dimension with depth of invasion 11 mm.  There was no lymphovascular invasion but perineural invasion was present involving small nerves.  The posterior lateral resection margin was involved by carcinoma although reresection was negative for carcinoma.  A total of 45 lymph nodes from right levels 1B- 4 were negative for metastatic disease.    Given tumor depth  of invasion and perineural invasion, she is considered at increased risk for local regional recurrence.  Because depth of invasion was 11 mm, this places her contralateral neck at increased risk for ramona recurrence.  We reviewed the rationale for recommending adjuvant radiation as well as the anticipated course of therapy, expected acute toxicities, potential long-term risks and expected outcome from treatment.  The patient and her  asked several questions which were answered such that they verbalized understanding of the issues.  Informed consent was obtained.    We will schedule treatment planning simulation once she is cleared with the dentist.    She is not breast-feeding.  She was prescribed gabapentin to start on the first day of radiation with dose taper up to 900 mg 3 times daily as part of a pain control regimen.  We discussed holding off placing a feeding tube until such time as she may need one.    We appreciate the opportunity to participate in Ms. Wagner's care.  Please do not hesitate to contact me should you have any questions regarding her visit with us today.    105 minutes spent by me on the date of the encounter doing chart review, history and exam, documentation and further activities per the note.    Mari Dias MD  Department of Radiation Oncology  [facility]    CC  Patient Care Team:  Storm Fitzpatrick MD as PCP - General (Family Medicine)  Jabier London MD as Assigned Surgical Provider  Mari Dias MD as MD (Radiation Oncology)  Jabier London MD as MD (Otolaryngology)                HPI    INITIAL PATIENT ASSESSMENT    Diagnosis: Cancer    Prior radiation therapy: None    Prior chemotherapy: None    Prior hormonal therapy:No    Pain Eval:  Denies    Psychosocial  Living arrangements: Lives with  with children  Fall Risk: independent   referral needs: Not needed    Advanced Directive: No  Implantable  Cardiac Device? No      Review of Systems   Constitutional: Negative.    HENT: Negative.    Eyes: Negative.    Respiratory: Negative.    Cardiovascular: Negative.    Gastrointestinal: Negative.    Genitourinary: Negative.    Musculoskeletal: Negative.    Skin: Negative.    Neurological: Negative.    Endo/Heme/Allergies: Negative.    Psychiatric/Behavioral: Positive for depression. The patient is nervous/anxious.          Again, thank you for allowing me to participate in the care of your patient.        Sincerely,        Mari Dias MD

## 2023-06-28 NOTE — LETTER
6/28/2023       RE: Kaya Wagner  [address] [facility] [address]     Dear Colleague,    Thank you for referring your patient, Kaya Wagner, to the [facility] EAR NOSE AND THROAT CLINIC [place] [facility]. Please see a copy of my visit note below.    Head and Neck Cancer Clinic Progress Note  June 28, 2023    Tumor Site: Right lateral tongue  Tumor Pathology: SCC   Tumor Stage: pT3N0  Tumor Treatment:   6/8/23 - Partial glossectomy, R MRND (IB-IV)     Brief History: Ms. Wagner is a 37-year-old female with a history of a right tongue squamous cell carcinoma.  She first noticed a sore on her right lateral tongue when she was 3 months pregnant, approximately 6 months ago. She was observed and this lesion was monitored for her pregnancy however it did not resolve. She underwent partial glossectomy with b/l neck dissection on 6/8and has pathology available for review.     Subjective/Intvl events: No issues since prior visit. Soft diet intake is going well.    Pathology:   A. ORAL CAVITY, RIGHT PARTIAL GLOSSECTOMY:  - INVASIVE KERATINIZING SQUAMOUS CELL CARCINOMA, MODERATELY DIFFERENTIATED  - Tumor size: 2.0 cm x DOI: 11 mm  - Lymphovascular invasion is not identified  - Perineural invasion is present, focal (small nerves)  - Postero-lateral resection margin involved by carcinoma  - All other margins are negative for carcinoma (closest deep margin is 1mm)  - AJCC Pathologic Stage: pT3, N0     B. ADDITIONAL DEEP MARGIN:  - Negative for carcinoma     C. LYMPH NODE, RIGHT LEVEL 1B, EXCISION:  - Five lymph nodes, negative for carcinoma (0/5)  - Benign salivary gland tissue     D. LYMPH NODE, RIGHT LEVEL 2A, EXCISION:  - Thirteen lymph nodes, negative for carcinoma (0/13)     E. LYMPH NODE, RIGHT LEVEL 3, EXCISION:  -Eleven lymph nodes, negative for carcinoma (0/11)     F. LYMPH NODE, RIGHT LEVEL 4, EXCISION:  - Fourteen lymph nodes, negative for carcinoma  "(0/14)     G. LYMPH NODE, RIGHT LEVEL 2B, EXCISION:  - Two lymph nodes, negative for carcinoma (0/2)     H. POSTERIOR LATERAL, RERESECTION:  - Negative for high grade dysplasia/carcinoma     I. POSTERIOR LATERAL MARGIN #2:  - Negative for high grade dysplasia/carcinoma    O: /69 (BP Location: Right arm, Patient Position: Sitting, Cuff Size: Adult Large)   Pulse 51   Ht 1.626 m (5' 4\")   Wt 98.9 kg (218 lb)   LMP  (LMP Unknown)   SpO2 98%   BMI 37.42 kg/m    General: Alert and oriented x 3, No acute distress. Accompanied.  Oral cavity: Mucosa moist. Right tongue raw surface appears well-healed. Neck: incision well-healed with one small vicryl stitch tail protruding; this was cut.  Pulmonary: Breathing non-labored, no stridor, no accessory muscle use.    A/P: Kaya Wagner is a 37 year old female with a past medical history of pT3N0 right tongue SCC now s/p wide local excision with right MRND (IB-IV). We will see her back in 2-3 months after the completion of radiation therapy, or earlier if any issues arise.    - RTC in 2-3 months after completion of radiation    -- Patient and above plan discussed with Dr. Surya Triplett MD  Otolaryngology-Head & Neck Surgery PGY3      I, Jabier London MD, saw this patient with the resident/fellow and agree with the resident's findings and plan of care as documented in the resident's/fellow's note.        Again, thank you for allowing me to participate in the care of your patient.      Sincerely,    Jabier London MD      "

## 2023-07-05 ENCOUNTER — OFFICE VISIT (OUTPATIENT)
Dept: RADIATION ONCOLOGY | Facility: CLINIC | Age: 38
End: 2023-07-05
Attending: OTOLARYNGOLOGY
Payer: OTHER GOVERNMENT

## 2023-07-05 ENCOUNTER — HOSPITAL ENCOUNTER (OUTPATIENT)
Dept: CT IMAGING | Facility: CLINIC | Age: 38
Discharge: HOME | End: 2023-07-05
Attending: RADIOLOGY | Admitting: RADIOLOGY
Payer: OTHER GOVERNMENT

## 2023-07-05 DIAGNOSIS — C02.9 PRIMARY SQUAMOUS CELL CARCINOMA OF TONGUE (H): ICD-10-CM

## 2023-07-05 DIAGNOSIS — C06.9 SQUAMOUS CELL CARCINOMA OF ORAL CAVITY (H): Primary | ICD-10-CM

## 2023-07-05 PROCEDURE — 77334 RADIATION TREATMENT AID(S): CPT | Mod: 26 | Performed by: RADIOLOGY

## 2023-07-05 PROCEDURE — 250N000011 HC RX IP 250 OP 636: Performed by: RADIOLOGY

## 2023-07-05 PROCEDURE — 999N000127 HC STATISTIC PERIPHERAL IV START W US GUIDANCE

## 2023-07-05 PROCEDURE — 77014 CT THERAPY CORRELATE: CPT | Mod: TC

## 2023-07-05 PROCEDURE — 77334 RADIATION TREATMENT AID(S): CPT | Performed by: RADIOLOGY

## 2023-07-05 RX ORDER — IOPAMIDOL 755 MG/ML
100 INJECTION, SOLUTION INTRAVASCULAR ONCE
Status: COMPLETED | OUTPATIENT
Start: 2023-07-05 | End: 2023-07-05

## 2023-07-05 RX ADMIN — IOPAMIDOL 100 ML: 755 INJECTION, SOLUTION INTRAVENOUS at 12:11

## 2023-07-05 NOTE — PROGRESS NOTES
A radiation therapy treatment planning simulation was performed.  Please see the [employer] record for documentation.    Mari Dias MD  Radiation Oncology

## 2023-07-05 NOTE — LETTER
7/5/2023         RE: Kaya Wagner  [address] [facility] [address]        Dear Colleague,    Thank you for referring your patient, Kaya Wagner, to the [facility] RADIATION ONCOLOGY. Please see a copy of my visit note below.    Radiation Therapy Patient Education    Person involved with teaching: Patient    Patient educational needs for self management of treatment-related side effects assessment completed.  EPIC Patient Ed tab contains Patient Learning Assessment    Education Materials Given  Radiation Therapy for Head and Neck    Educational Topics Discussed  Side effects expected, Pain management, Skin care, Nutrition and weight loss and When to call MD/RN    Response To Teaching  Verbalizes understanding    GYN Only  Vaginal Dilator-given and educated: N/A    Referrals sent: Dental, Speech and Swallowing and Nutrition    Chemotherapy?  No        A radiation therapy treatment planning simulation was performed.  Please see the [facility] record for documentation.    Mari Dias MD  Radiation Oncology      Again, thank you for allowing me to participate in the care of your patient.        Sincerely,        Mari Dias MD

## 2023-07-05 NOTE — PROGRESS NOTES
Radiation Therapy Patient Education    Person involved with teaching: Patient    Patient educational needs for self management of treatment-related side effects assessment completed.  [facility] Patient Ed tab contains Patient Learning Assessment    Education Materials Given  Radiation Therapy for Head and Neck    Educational Topics Discussed  Side effects expected, Pain management, Skin care, Nutrition and weight loss and When to call MD/RN    Response To Teaching  Verbalizes understanding    GYN Only  Vaginal Dilator-given and educated: N/A    Referrals sent: Dental, Speech and Swallowing and Nutrition    Chemotherapy?  No       Recommended OBSERVATION and MONITORING for change.

## 2023-07-17 ENCOUNTER — APPOINTMENT (OUTPATIENT)
Dept: RADIATION ONCOLOGY | Facility: CLINIC | Age: 38
End: 2023-07-17
Attending: RADIOLOGY
Payer: OTHER GOVERNMENT

## 2023-07-17 PROCEDURE — 77014 PR CT GUIDE FOR PLACEMENT RADIATION THERAPY FIELDS: CPT | Mod: 26 | Performed by: RADIOLOGY

## 2023-07-17 PROCEDURE — 77386 HC IMRT TREATMENT DELIVERY, COMPLEX: CPT | Performed by: RADIOLOGY

## 2023-07-17 PROCEDURE — 77332 RADIATION TREATMENT AID(S): CPT | Mod: 26 | Performed by: RADIOLOGY

## 2023-07-17 PROCEDURE — 77332 RADIATION TREATMENT AID(S): CPT | Performed by: RADIOLOGY

## 2023-07-18 ENCOUNTER — OFFICE VISIT (OUTPATIENT)
Dept: RADIATION ONCOLOGY | Facility: CLINIC | Age: 38
End: 2023-07-18
Attending: RADIOLOGY
Payer: OTHER GOVERNMENT

## 2023-07-18 VITALS
WEIGHT: 226.1 LBS | HEART RATE: 72 BPM | SYSTOLIC BLOOD PRESSURE: 124 MMHG | DIASTOLIC BLOOD PRESSURE: 77 MMHG | BODY MASS INDEX: 38.81 KG/M2

## 2023-07-18 DIAGNOSIS — C06.9 SQUAMOUS CELL CARCINOMA OF ORAL CAVITY (H): Primary | ICD-10-CM

## 2023-07-18 PROCEDURE — 77386 HC IMRT TREATMENT DELIVERY, COMPLEX: CPT | Performed by: RADIOLOGY

## 2023-07-18 NOTE — LETTER
2023         RE: Kaya Wagner  [address] [facility] [address]        Dear Colleague,    Thank you for referring your patient, Kaya Wagner, to the [facility] RADIATION ONCOLOGY. Please see a copy of my visit note below.    RADIATION ONCOLOGY WEEKLY ON TREATMENT VISIT   Encounter Date:     Patient Name: Kaya Wagner  MRN: [id number]  : 1985     Disease and Stage: pT3 pN0 cM0 squamous cell carcinoma of the right lateral oral tongue  Treatment Site: Right oral cavity and bilateral neck  Current Dose/Planned Total Dose: 400/6000 cGy  Current Fraction/Planned Total fractions:   Concurrent Chemotherapy: No  Drug and Frequency: Not applicable      ED visits/Hospitalizations:  None    Missed Treatments:  None    Subjective: Ms. Wagner presents to clinic today for her weekly on-treatment visit.  She is doing generally well with no specific concerns at this time.    Nursing ROS:   Nutrition Alteration  Diet Type: Patient's Preference  Skin  Skin Reaction: 0 - No changes     ENT and Mouth Exam  Mucositis - Current: 0 - None   Cardiovascular  Respiratory effort: 1 - Normal - without distress           Pain Assessment  0-10 Pain Scale: 0    Objective:   /77   Pulse 72   Wt 102.6 kg (226 lb 1.6 oz)   LMP  (LMP Unknown)   BMI 38.81 kg/m     KPS 90  ECOG 0  Pain Score 0 out of 10  General: Alert and in no acute distress.  HEENT: Normocephalic, atraumatic. No visible scleral icterus.  No mucositis in the oral cavity.  No erythema on the neck.  Neck: Apparent full range of motion.  CV: Appears well-perfused, with no visible cyanosis.  Lungs: Breathing easily on room air, with no difficulty completing full sentences  Extremities:  No visible edema of the upper extremities.   Neuro: Alert and oriented; grossly nonfocal. Normal speech. Moving upper and lower extremities equally.  Gait within normal limits.  Skin: No visible jaundice. No suspicious lesions of the  visualized integuments.  Psych: Mood and affect are appropriate to given situation. Answers questions appropriately.        Treatment-related toxicities (CTCAE v5.0):  Anorexia: Grade 0: No toxicity  Fatigue: Grade 0: No toxicity  Nausea: Grade 0: No toxicity  Pain: Grade 0: No toxicity  Dry mouth: Grade 0: No toxicity  Dysphagia: Grade 0: No toxicity  Mucositis: Grade 0: No toxicity    Assessment:    Ms. 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  Pathology shows tumor size measuring 2.0 cm in maximum dimension with depth of invasion 11 mm.  There was no lymphovascular invasion but perineural invasion was present involving small nerves.  The posterior lateral resection margin was involved by carcinoma although reresection was negative for carcinoma.  A total of 45 lymph nodes from right levels 1B- 4 were negative for metastatic disease.  As such, she is being managed with adjuvant radiotherapy to the right oral cavity and bilateral neck.    Plan:   Right lateral oral tongue cancer:  Continue adjuvant radiotherapy    Pain management:  None presently indicated    Fluids/Electrolytes/Nutrition:  Diet as per patient tolerance    Dermatitis:  None.  Reviewed skin care.    [facility] chart and setup information reviewed  IGRT images reviewed    Medication Review  Med list reviewed with patient?: Yes    Mini Bridges MD, MPH, PhD     Department of Radiation Oncology  [facility]             Again, thank you for allowing me to participate in the care of your patient.        Sincerely,        Mini Bridges

## 2023-07-18 NOTE — PROGRESS NOTES
RADIATION ONCOLOGY WEEKLY ON TREATMENT VISIT   Encounter Date: July 18, 2023    Patient Name: Kaya Wagner  MRN: [id number]    Disease and Stage: pT3 pN0 cM0 squamous cell carcinoma of the right lateral oral tongue   Treatment Site: Head and necks  Current Dose/Planned Total Dose: 400 cGy / 6000 cGy                 Daily Fraction Size:     200  cGy/ day,  5 times/week  Concurrent Chemotherapy: No    PEG Tube: No      SUBJECTIVE: Ms. Wagner presents to clinic today for her weekly on-treatment visit. ***      Nursing ROS:                                OBJECTIVE:    Vital signs: LMP  (LMP Unknown)   General:   HEENT:  Neck:      Lab Results   Component Value Date    WBC 10.5 06/09/2023    HGB 11.6 (L) 06/09/2023    HCT 35.6 06/09/2023    MCV 90 06/09/2023     06/09/2023     Lab Results   Component Value Date     06/09/2023    POTASSIUM 4.6 06/09/2023    CHLORIDE 102 06/09/2023    CO2 27 06/09/2023     (H) 06/10/2023       Treatment-related toxicities (CTCAE v5.0):  ***      IMPRESSION:  Ms. Wagner is a 37 year old woman with diagnosis of a pT3 pN0 cM0 squamous cell carcinoma of the right lateral oral tongue. She is currently undergoing adjuvant radiotherapy.        PLAN:   1. Continue radiation therapy.  2. Continue oral cavity care with salt and soda rinse  3. Continue skin care with aquaphor  4. Continue to press PO intake, including fluids and soft foods as tolerated.  5. Continue gabapentin - taper up to 900 mg TID.      [facility] chart and setup information reviewed  IGRT images reviewed         Karishma Aden MD PGY5  ***

## 2023-07-19 ENCOUNTER — APPOINTMENT (OUTPATIENT)
Dept: RADIATION ONCOLOGY | Facility: CLINIC | Age: 38
End: 2023-07-19
Attending: RADIOLOGY
Payer: OTHER GOVERNMENT

## 2023-07-19 PROCEDURE — 77386 HC IMRT TREATMENT DELIVERY, COMPLEX: CPT | Performed by: RADIOLOGY

## 2023-07-19 PROCEDURE — 77014 PR CT GUIDE FOR PLACEMENT RADIATION THERAPY FIELDS: CPT | Mod: 26 | Performed by: RADIOLOGY

## 2023-07-20 ENCOUNTER — APPOINTMENT (OUTPATIENT)
Dept: RADIATION ONCOLOGY | Facility: CLINIC | Age: 38
End: 2023-07-20
Attending: RADIOLOGY
Payer: OTHER GOVERNMENT

## 2023-07-20 ENCOUNTER — THERAPY VISIT (OUTPATIENT)
Dept: SPEECH THERAPY | Facility: CLINIC | Age: 38
End: 2023-07-20
Payer: OTHER GOVERNMENT

## 2023-07-20 DIAGNOSIS — C02.9 PRIMARY SQUAMOUS CELL CARCINOMA OF TONGUE (H): Primary | ICD-10-CM

## 2023-07-20 DIAGNOSIS — R13.11 ORAL PHASE DYSPHAGIA: ICD-10-CM

## 2023-07-20 DIAGNOSIS — C06.9 SQUAMOUS CELL CARCINOMA OF ORAL CAVITY (H): ICD-10-CM

## 2023-07-20 PROCEDURE — 77386 HC IMRT TREATMENT DELIVERY, COMPLEX: CPT | Performed by: RADIOLOGY

## 2023-07-20 PROCEDURE — 77014 PR CT GUIDE FOR PLACEMENT RADIATION THERAPY FIELDS: CPT | Mod: 26 | Performed by: STUDENT IN AN ORGANIZED HEALTH CARE EDUCATION/TRAINING PROGRAM

## 2023-07-20 PROCEDURE — 92526 ORAL FUNCTION THERAPY: CPT | Mod: GN | Performed by: SPEECH-LANGUAGE PATHOLOGIST

## 2023-07-21 ENCOUNTER — APPOINTMENT (OUTPATIENT)
Dept: RADIATION ONCOLOGY | Facility: CLINIC | Age: 38
End: 2023-07-21
Attending: RADIOLOGY
Payer: OTHER GOVERNMENT

## 2023-07-21 PROCEDURE — 77014 PR CT GUIDE FOR PLACEMENT RADIATION THERAPY FIELDS: CPT | Mod: 26 | Performed by: RADIOLOGY

## 2023-07-21 PROCEDURE — 77427 RADIATION TX MANAGEMENT X5: CPT | Performed by: RADIOLOGY

## 2023-07-21 PROCEDURE — 77336 RADIATION PHYSICS CONSULT: CPT | Performed by: RADIOLOGY

## 2023-07-21 PROCEDURE — 77386 HC IMRT TREATMENT DELIVERY, COMPLEX: CPT | Performed by: RADIOLOGY

## 2023-07-24 ENCOUNTER — APPOINTMENT (OUTPATIENT)
Dept: RADIATION ONCOLOGY | Facility: CLINIC | Age: 38
End: 2023-07-24
Attending: RADIOLOGY
Payer: OTHER GOVERNMENT

## 2023-07-24 PROCEDURE — 77014 PR CT GUIDE FOR PLACEMENT RADIATION THERAPY FIELDS: CPT | Mod: 26 | Performed by: RADIOLOGY

## 2023-07-24 PROCEDURE — 77386 HC IMRT TREATMENT DELIVERY, COMPLEX: CPT | Performed by: RADIOLOGY

## 2023-07-24 NOTE — PROGRESS NOTES
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 Wagner presents to clinic today for her weekly on-treatment visit.  She is doing generally well with no specific concerns at this time.    Nursing ROS:   Nutrition Alteration  Diet Type: Patient's Preference  Skin  Skin Reaction: 0 - No changes     ENT and Mouth Exam  Mucositis - Current: 0 - None   Cardiovascular  Respiratory effort: 1 - Normal - without distress           Pain Assessment  0-10 Pain Scale: 0    Objective:   /77   Pulse 72   Wt 102.6 kg (226 lb 1.6 oz)   LMP  (LMP Unknown)   BMI 38.81 kg/m     KPS 90  ECOG 0  Pain Score 0 out of 10  General: Alert and in no acute distress.  HEENT: Normocephalic, atraumatic. No visible scleral icterus.  No mucositis in the oral cavity.  No erythema on the neck.  Neck: Apparent full range of motion.  CV: Appears well-perfused, with no visible cyanosis.  Lungs: Breathing easily on room air, with no difficulty completing full sentences  Extremities:  No visible edema of the upper extremities.   Neuro: Alert and oriented; grossly nonfocal. Normal speech. Moving upper and lower extremities equally.  Gait within normal limits.  Skin: No visible jaundice. No suspicious lesions of the visualized integuments.  Psych: Mood and affect are appropriate to given situation. Answers questions appropriately.        Treatment-related toxicities (CTCAE v5.0):  1. Anorexia: Grade 0: No toxicity  2. Fatigue: Grade 0: No toxicity  3. Nausea: Grade 0: No  toxicity  4. Pain: Grade 0: No toxicity  5. Dry mouth: Grade 0: No toxicity  6. Dysphagia: Grade 0: No toxicity  7. Mucositis: Grade 0: No toxicity    Assessment:    Ms. 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  Pathology shows tumor size measuring 2.0 cm in maximum dimension with depth of invasion 11 mm.  There was no lymphovascular invasion but perineural invasion was present involving small nerves.  The posterior lateral resection margin was involved by carcinoma although reresection was negative for carcinoma.  A total of 45 lymph nodes from right levels 1B- 4 were negative for metastatic disease.  As such, she is being managed with adjuvant radiotherapy to the right oral cavity and bilateral neck.    Plan:   Right lateral oral tongue cancer:    Continue adjuvant radiotherapy    Pain management:    None presently indicated    Fluids/Electrolytes/Nutrition:    Diet as per patient tolerance    Dermatitis:    None.  Reviewed skin care.    Mosaiq chart and setup information reviewed  IGRT images reviewed    Medication Review  Med list reviewed with patient?: Yes    Mini Bridges MD, MPH, PhD     Department of Radiation Oncology  [facility]

## 2023-07-25 ENCOUNTER — APPOINTMENT (OUTPATIENT)
Dept: RADIATION ONCOLOGY | Facility: CLINIC | Age: 38
End: 2023-07-25
Attending: RADIOLOGY
Payer: OTHER GOVERNMENT

## 2023-07-25 VITALS
BODY MASS INDEX: 38.71 KG/M2 | DIASTOLIC BLOOD PRESSURE: 74 MMHG | HEART RATE: 63 BPM | WEIGHT: 225.5 LBS | SYSTOLIC BLOOD PRESSURE: 108 MMHG | OXYGEN SATURATION: 98 %

## 2023-07-25 DIAGNOSIS — C06.9 SQUAMOUS CELL CARCINOMA OF ORAL CAVITY (H): Primary | ICD-10-CM

## 2023-07-25 PROCEDURE — 77386 HC IMRT TREATMENT DELIVERY, COMPLEX: CPT | Performed by: RADIOLOGY

## 2023-07-25 NOTE — LETTER
2023         RE: Kaya Wagner  [address] [facility] [address]        Dear Colleague,    Thank you for referring your patient, Kaya Wagner, to the [facility] RADIATION ONCOLOGY. Please see a copy of my visit note below.      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 Wagner presents to clinic today for her weekly on-treatment visit.  Overall, she continues to tolerate adjuvant radiation well.  She is not experiencing any pain.  She reports biting the side of her tongue when eating toast.  It bled and has since then been tender.    Nursing ROS:   Nutrition Alteration  Diet Type: Patient's Preference  Nutrition Note: Supplementing with IV hydration  Skin  Skin Reaction: 0 - No changes  Skin Intervention: Using aquaphor     ENT and Mouth Exam  Mucositis - Current: 0 - None   ENT/Mouth Note: Bit the side of her tongue and has a sore now  Cardiovascular  Respiratory effort: 1 - Normal - without distress        Psychosocial  Psychosocial Note: feeling well, has help from family  Pain Assessment  0-10 Pain Scale: 0    PEG Tube:  No  Electronic Cardiac Implant: No     Objective:   /74   Pulse 63   Wt 102.3 kg (225 lb 8 oz)   LMP  (LMP Unknown)   SpO2 98%   BMI 38.71 kg/m    Gen: Appears well, NAD  HEENT: 0.7 x 8.7 cm superficial ulceration along the right lateral oral tongue without associated induration or stigmata of bleeding, no mucositis, no thrush  Skin: No erythema    Laboratory:  Lab Results   Component Value Date    WBC 10.5 2023    HGB 11.6 (L)  06/09/2023    HCT 35.6 06/09/2023    MCV 90 06/09/2023     06/09/2023     Lab Results   Component Value Date     06/09/2023    POTASSIUM 4.6 06/09/2023    CHLORIDE 102 06/09/2023    CO2 27 06/09/2023     (H) 06/10/2023     No results found for: MAG    Treatment-related toxicities (CTCAE v5.0):  Anorexia: Grade 0: No toxicity  Fatigue: Grade 0: No toxicity  Nausea: Grade 0: No toxicity  Pain: Grade 0: No toxicity  Dry mouth: Grade 0: No toxicity  Dysphagia: Grade 0: No toxicity  Mucositis: Grade 0: No toxicity  Dermatitis: Grade 0: No toxicity      ED visits/Hospitalizations: None    Missed Treatments: None    Mosaiq chart and setup information reviewed  IGRT images reviewed    Medication Review  Med Note: No changes indicated    Assessment:    Ms. 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      Plan:   1.  Continue treatment as planned  2.  Area where she bit her tongue appears to be healing and we will continue to monitor.      Mari Dias  Department of Radiation Oncology  [facility]

## 2023-07-26 ENCOUNTER — APPOINTMENT (OUTPATIENT)
Dept: RADIATION ONCOLOGY | Facility: CLINIC | Age: 38
End: 2023-07-26
Attending: RADIOLOGY
Payer: OTHER GOVERNMENT

## 2023-07-26 PROCEDURE — 77386 HC IMRT TREATMENT DELIVERY, COMPLEX: CPT | Performed by: RADIOLOGY

## 2023-07-26 PROCEDURE — 77014 PR CT GUIDE FOR PLACEMENT RADIATION THERAPY FIELDS: CPT | Mod: 26 | Performed by: RADIOLOGY

## 2023-07-26 NOTE — PROGRESS NOTES
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 Wagner presents to clinic today for her weekly on-treatment visit.  Overall, she continues to tolerate adjuvant radiation well.  She is not experiencing any pain.  She reports biting the side of her tongue when eating toast.  It bled and has since then been tender.    Nursing ROS:   Nutrition Alteration  Diet Type: Patient's Preference  Nutrition Note: Supplementing with IV hydration  Skin  Skin Reaction: 0 - No changes  Skin Intervention: Using aquaphor     ENT and Mouth Exam  Mucositis - Current: 0 - None   ENT/Mouth Note: Bit the side of her tongue and has a sore now  Cardiovascular  Respiratory effort: 1 - Normal - without distress        Psychosocial  Psychosocial Note: feeling well, has help from family  Pain Assessment  0-10 Pain Scale: 0    PEG Tube:  No  Electronic Cardiac Implant: No     Objective:   /74   Pulse 63   Wt 102.3 kg (225 lb 8 oz)   LMP  (LMP Unknown)   SpO2 98%   BMI 38.71 kg/m    Gen: Appears well, NAD  HEENT: 0.7 x 8.7 cm superficial ulceration along the right lateral oral tongue without associated induration or stigmata of bleeding, no mucositis, no thrush  Skin: No erythema    Laboratory:  Lab Results   Component Value Date    WBC 10.5 2023    HGB 11.6 (L) 2023    HCT 35.6 2023    MCV 90 2023     2023     Lab Results   Component Value Date     2023    POTASSIUM 4.6 2023    CHLORIDE 102 2023    CO2 27 2023     (H) 06/10/2023     No results  found for: MAG    Treatment-related toxicities (CTCAE v5.0):  Anorexia: Grade 0: No toxicity  Fatigue: Grade 0: No toxicity  Nausea: Grade 0: No toxicity  Pain: Grade 0: No toxicity  Dry mouth: Grade 0: No toxicity  Dysphagia: Grade 0: No toxicity  Mucositis: Grade 0: No toxicity  Dermatitis: Grade 0: No toxicity      ED visits/Hospitalizations: None    Missed Treatments: None    Mosaiq chart and setup information reviewed  IGRT images reviewed    Medication Review  Med Note: No changes indicated    Assessment:    Ms. 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      Plan:   1.  Continue treatment as planned  2.  Area where she bit her tongue appears to be healing and we will continue to monitor.      aMri Dias  Department of Radiation Oncology  [facility]

## 2023-07-27 ENCOUNTER — THERAPY VISIT (OUTPATIENT)
Dept: SPEECH THERAPY | Facility: CLINIC | Age: 38
End: 2023-07-27
Payer: OTHER GOVERNMENT

## 2023-07-27 ENCOUNTER — APPOINTMENT (OUTPATIENT)
Dept: RADIATION ONCOLOGY | Facility: CLINIC | Age: 38
End: 2023-07-27
Attending: RADIOLOGY
Payer: OTHER GOVERNMENT

## 2023-07-27 ENCOUNTER — ALLIED HEALTH/NURSE VISIT (OUTPATIENT)
Dept: RADIATION ONCOLOGY | Facility: CLINIC | Age: 38
End: 2023-07-27
Attending: OTOLARYNGOLOGY
Payer: OTHER GOVERNMENT

## 2023-07-27 DIAGNOSIS — C06.9 SQUAMOUS CELL CARCINOMA OF ORAL CAVITY (H): Primary | ICD-10-CM

## 2023-07-27 DIAGNOSIS — C02.9 PRIMARY SQUAMOUS CELL CARCINOMA OF TONGUE (H): Primary | ICD-10-CM

## 2023-07-27 DIAGNOSIS — C06.9 SQUAMOUS CELL CARCINOMA OF ORAL CAVITY (H): ICD-10-CM

## 2023-07-27 DIAGNOSIS — R13.11 ORAL PHASE DYSPHAGIA: ICD-10-CM

## 2023-07-27 PROBLEM — K90.0 CELIAC DISEASE: Status: ACTIVE | Noted: 2020-10-24

## 2023-07-27 PROBLEM — G43.909 MIGRAINE SYNDROME: Status: ACTIVE | Noted: 2017-06-30

## 2023-07-27 PROBLEM — R73.03 PREDIABETES: Status: ACTIVE | Noted: 2020-08-18

## 2023-07-27 PROBLEM — K76.0 NAFLD (NONALCOHOLIC FATTY LIVER DISEASE): Status: ACTIVE | Noted: 2022-04-04

## 2023-07-27 PROBLEM — O40.9XX0 POLYHYDRAMNIOS, ANTEPARTUM COMPLICATION: Status: ACTIVE | Noted: 2023-02-22

## 2023-07-27 PROBLEM — M51.369 DDD (DEGENERATIVE DISC DISEASE), LUMBAR: Status: ACTIVE | Noted: 2017-06-24

## 2023-07-27 PROBLEM — F33.2 SEVERE EPISODE OF RECURRENT MAJOR DEPRESSIVE DISORDER, WITHOUT PSYCHOTIC FEATURES (H): Status: ACTIVE | Noted: 2021-04-05

## 2023-07-27 PROBLEM — O99.212 MATERNAL MORBID OBESITY IN SECOND TRIMESTER, ANTEPARTUM (H): Status: ACTIVE | Noted: 2023-07-27

## 2023-07-27 PROBLEM — E66.01 MATERNAL MORBID OBESITY IN SECOND TRIMESTER, ANTEPARTUM (H): Status: ACTIVE | Noted: 2023-07-27

## 2023-07-27 PROBLEM — E55.9 VITAMIN D DEFICIENCY: Status: ACTIVE | Noted: 2021-06-29

## 2023-07-27 PROBLEM — E66.01 MORBID OBESITY WITH BMI OF 40.0-44.9, ADULT (H): Status: ACTIVE | Noted: 2018-08-31

## 2023-07-27 PROBLEM — F43.10 PTSD (POST-TRAUMATIC STRESS DISORDER): Status: ACTIVE | Noted: 2021-06-10

## 2023-07-27 PROCEDURE — 92526 ORAL FUNCTION THERAPY: CPT | Mod: GN | Performed by: SPEECH-LANGUAGE PATHOLOGIST

## 2023-07-27 PROCEDURE — 97802 MEDICAL NUTRITION INDIV IN: CPT | Mod: 59 | Performed by: DIETITIAN, REGISTERED

## 2023-07-27 PROCEDURE — 77386 HC IMRT TREATMENT DELIVERY, COMPLEX: CPT | Performed by: RADIOLOGY

## 2023-07-27 PROCEDURE — 77014 PR CT GUIDE FOR PLACEMENT RADIATION THERAPY FIELDS: CPT | Mod: 26 | Performed by: RADIOLOGY

## 2023-07-27 NOTE — PROGRESS NOTES
"CLINICAL NUTRITION SERVICES - ASSESSMENT NOTE    Kaya Wagner 37 year old referred for MNT related to head/neck cancer     Time Spent: 30 minutes  Visit Type: initial  Pt accompanied by: self  Referring Physician: Arun 7/5/23  C06.9 (ICD-10-CM) - Squamous cell carcinoma of oral cavity (H)     NUTRITION HISTORY  Factors affecting nutrition intake include:none at this time  Current diet/appetite: general diet/good appetite and intake  Chemotherapy: No  Radiation: Started 7/17/23  Surgery history: R Neck dissection, partial glossectomy 6/8/23  PEG tube: No; previous NG tube    Kaya tells me that her appetite and intake have been good.  She has been eating all types of foods and textures without difficulty.   She has been noticing taste changes, mostly with water.  She tells me that water has been tasting like salt.    She feels like she has been hydrating well despite water tasting poorly.   She tends to eat 2 meals/day and snacks most days.   She had a Naked drink smoothie yesterday that had 30 grams of protein.      Diet Recall  Brunch Eggs, toast with butter OR Oatmeal with butter, brown sugar, +/- milk   Dinner Mac and cheese OR burger with grilled potatoes, corn   Snacks Peanut butter toast, pudding, applesauce, Naked drink   Beverages Water       ANTHROPOMETRICS  Height: 64\"  Weight: 219 lbs/99kg  BMI: 37  Weight Status:  Obesity Grade II BMI 35-39.9  IBW: 120 lbs (182%)  Weight History: up ~7 lb x past one month  Wt Readings from Last 10 Encounters:   07/25/23 102.3 kg (225 lb 8 oz)   07/18/23 102.6 kg (226 lb 1.6 oz)   06/28/23 98.9 kg (218 lb)   06/28/23 99.3 kg (219 lb)   06/14/23 98.4 kg (217 lb)   06/09/23 103.6 kg (228 lb 6.3 oz)   05/31/23 103.9 kg (229 lb)     Dosing Weight: 65kg    Medications/vitamins/minerals/herbals:   Reviewed    Labs:   Labs reviewed    NUTRITION FOCUSED PHYSICAL ASSESSMENT FOR DIAGNOSING MALNUTRITION:  Consult for education only      ASSESSED NUTRITION NEEDS:  Estimated " Energy Needs: 5698-8520 kcals (25-30 Kcal/Kg)  Justification: maintenance  Estimated Protein Needs: 80 grams protein (1.2 g pro/Kg)  Justification: increased needs with radiation   Estimated Fluid Needs: 2000  mL   Justification: maintenance    NUTRITION DIAGNOSIS:  Predicted suboptimal nutrient intake related to cancer treatment to head/neck region    INTERVENTIONS  Provided written & verbal education:     - Reviewed nutrition and hydration needs.   Advised pt to aim for at least 1600kcal and 80g protein daily.    Advised pt to aim for 8 cups non-caffeine containing beverages (water/electrolytes) daily.    - Discussed strategies to help fortify meals and snacks. Encouraged to focus on small, frequent meals.    - Reviewed sources of protein. Encouraged to have a protein source with each meal and snack.    - Reviewed common barriers to eating with cancer treatment.  Discussed ways to cope with mucositis.   - Reviewed high calorie/high protein oral nutrition supplement options (Ensure Complete, Ensure Plus/Boost Plus, Weight deepa powders etc).   - Encouraged utilizing these ONS in home made shakes/smoothies to prevent flavor fatigue.      Provided pt with corresponding education materials/handouts on:  Academy of Nutrition and Dietetics High mary/High protein recipes, Academy of Nutrition and Dietetics High protein list, Sources of Protein, Nutrition considerations for head/neck cancer, Tips to increase calories in your diet    Pt verbalize understanding of materials provided during consult.   Patient Understanding: good  Expected patient engagement: good     Goals  1.  Aim for 5-6 small frequent meals  2.  Aim for 1600kcal and 80g protein/day  3. Weight maintenance/no more than 2 lb wt loss per week.     Follow-Up Plans: Pt has RD contact information for questions.  Follow up scheduled for one week, 8/3    MONITORING AND EVALUATION:  -Food/beverage intake  -Weight trends    Kiarra Grayson RD, ,  LD

## 2023-07-28 ENCOUNTER — APPOINTMENT (OUTPATIENT)
Dept: RADIATION ONCOLOGY | Facility: CLINIC | Age: 38
End: 2023-07-28
Attending: RADIOLOGY
Payer: OTHER GOVERNMENT

## 2023-07-28 PROCEDURE — 77427 RADIATION TX MANAGEMENT X5: CPT | Performed by: RADIOLOGY

## 2023-07-28 PROCEDURE — 77336 RADIATION PHYSICS CONSULT: CPT | Performed by: RADIOLOGY

## 2023-07-28 PROCEDURE — 77014 PR CT GUIDE FOR PLACEMENT RADIATION THERAPY FIELDS: CPT | Mod: 26 | Performed by: RADIOLOGY

## 2023-07-28 PROCEDURE — 77386 HC IMRT TREATMENT DELIVERY, COMPLEX: CPT | Performed by: RADIOLOGY

## 2023-07-31 ENCOUNTER — APPOINTMENT (OUTPATIENT)
Dept: RADIATION ONCOLOGY | Facility: CLINIC | Age: 38
End: 2023-07-31
Attending: RADIOLOGY
Payer: OTHER GOVERNMENT

## 2023-07-31 PROCEDURE — 77014 PR CT GUIDE FOR PLACEMENT RADIATION THERAPY FIELDS: CPT | Mod: 26 | Performed by: RADIOLOGY

## 2023-07-31 PROCEDURE — 77386 HC IMRT TREATMENT DELIVERY, COMPLEX: CPT | Performed by: RADIOLOGY

## 2023-08-01 ENCOUNTER — APPOINTMENT (OUTPATIENT)
Dept: RADIATION ONCOLOGY | Facility: CLINIC | Age: 38
End: 2023-08-01
Attending: RADIOLOGY
Payer: OTHER GOVERNMENT

## 2023-08-01 VITALS
DIASTOLIC BLOOD PRESSURE: 72 MMHG | SYSTOLIC BLOOD PRESSURE: 136 MMHG | WEIGHT: 223 LBS | BODY MASS INDEX: 38.28 KG/M2 | HEART RATE: 62 BPM

## 2023-08-01 DIAGNOSIS — G89.3 CANCER RELATED PAIN: ICD-10-CM

## 2023-08-01 DIAGNOSIS — C06.9 SQUAMOUS CELL CARCINOMA OF ORAL CAVITY (H): Primary | ICD-10-CM

## 2023-08-01 PROCEDURE — 77386 HC IMRT TREATMENT DELIVERY, COMPLEX: CPT | Performed by: RADIOLOGY

## 2023-08-01 RX ORDER — DIPHENHYDRAMINE HYDROCHLORIDE AND LIDOCAINE HYDROCHLORIDE AND ALUMINUM HYDROXIDE AND MAGNESIUM HYDRO
5-10 KIT EVERY 4 HOURS PRN
Qty: 237 ML | Refills: 11 | Status: SHIPPED | OUTPATIENT
Start: 2023-08-01 | End: 2024-02-18

## 2023-08-01 RX ORDER — GUAIFENESIN 200 MG/10ML
200 LIQUID ORAL EVERY 4 HOURS PRN
Qty: 236 ML | Refills: 11 | Status: SHIPPED | OUTPATIENT
Start: 2023-08-01 | End: 2024-02-18

## 2023-08-01 NOTE — PROGRESS NOTES
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 Wagner presents to clinic today for her weekly on-treatment visit.  In the past week, she has begun to experience more intraoral pain and has had difficulty with drinking and eating over the past 2 days.  She is taking gabapentin 900 mg 3 times daily.  She also has oxycodone from her postoperative medication list and has tried taking 1 tablet but did not have any pain relief.  She is also experiencing increased phlegm production which is difficult to cough up secondary to pain.    Nursing ROS:   Nutrition Alteration  Diet Type: Patient's Preference  Skin  Skin Reaction: 0 - No changes  Skin Intervention: Using aquaphor     ENT and Mouth Exam  Mucositis - Current: 0 - None   Cardiovascular  Respiratory effort: 1 - Normal - without distress    Pain Assessment  0-10 Pain Scale: 3  Pain Treatment: Gabapentin/Tylenol     PEG Tube:  No  Electronic Cardiac Implant: No     Objective:   /72   Pulse 62   Wt 101.2 kg (223 lb)   LMP  (LMP Unknown)   BMI 38.28 kg/m    Gen: Appears well, NAD  HEENT: 0.7 x 8.7 cm superficial ulceration along the right lateral oral tongue without associated induration or stigmata of bleeding, no mucositis, no thrush  Skin: Minimal erythema    Laboratory:  Lab Results   Component Value Date    WBC 10.5 2023    HGB 11.6 (L) 2023    HCT 35.6 2023    MCV 90 2023     2023     Lab Results   Component Value Date     2023    POTASSIUM 4.6 2023    CHLORIDE 102 2023    CO2 27  06/09/2023     (H) 06/10/2023     No results found for: MAG    Treatment-related toxicities (CTCAE v5.0):  Anorexia: Grade 1: Loss of appetite without alteration in eating habits  Fatigue: Grade 1: Fatigue relieved by rest  Nausea: Grade 0: No toxicity  Pain: Grade 1: Mild pain  Dry mouth: Grade 1: Symptomatic without significant dietary alteration; unstimulated saliva flow >0.2 mL/min  Dysphagia: Grade 1: Symptomatic, able to eat regular diet  Mucositis: Grade 2: Moderate pain; not interfering with oral intake; modified diet indicated  Dermatitis: Grade 0: No toxicity      ED visits/Hospitalizations: None    Missed Treatments: None    Mosaiq chart and setup information reviewed  IGRT images reviewed    Medication Review  Med Note: No changes indicated    Assessment:    Ms. 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  She is experiencing escalation of pain.    Plan:   1.  Continue treatment as planned  2.  Increase gabapentin to 900 mg 4 times daily  3.  Acetaminophen 1000 mg 3 times daily  4.  Magic mouthwash every 4 hours as needed for pain  5.  Guaifenesin 200 mg every 4 hours as needed phlegm  6.  Continue to monitor for pain control.  Patient may take an oxycodone tablet at night as needed.      Mari Dias  Department of Radiation Oncology  [facility]

## 2023-08-01 NOTE — LETTER
2023         RE: Kaya Wagner  [id number] [facility] [address]        Dear Colleague,    Thank you for referring your patient, Kaya Wagner, to the [facility] RADIATION ONCOLOGY. Please see a copy of my visit note below.    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 Wagner presents to clinic today for her weekly on-treatment visit.  In the past week, she has begun to experience more intraoral pain and has had difficulty with drinking and eating over the past 2 days.  She is taking gabapentin 900 mg 3 times daily.  She also has oxycodone from her postoperative medication list and has tried taking 1 tablet but did not have any pain relief.  She is also experiencing increased phlegm production which is difficult to cough up secondary to pain.    Nursing ROS:   Nutrition Alteration  Diet Type: Patient's Preference  Skin  Skin Reaction: 0 - No changes  Skin Intervention: Using aquaphor     ENT and Mouth Exam  Mucositis - Current: 0 - None   Cardiovascular  Respiratory effort: 1 - Normal - without distress    Pain Assessment  0-10 Pain Scale: 3  Pain Treatment: Gabapentin/Tylenol     PEG Tube:  No  Electronic Cardiac Implant: No     Objective:   /72   Pulse 62   Wt 101.2 kg (223 lb)   LMP  (LMP Unknown)   BMI 38.28 kg/m    Gen: Appears well, NAD  HEENT: 0.7 x 8.7 cm superficial ulceration along the right lateral oral tongue without associated induration or stigmata of bleeding, no mucositis, no thrush  Skin: Minimal erythema    Laboratory:  Lab Results   Component  Value Date    WBC 10.5 06/09/2023    HGB 11.6 (L) 06/09/2023    HCT 35.6 06/09/2023    MCV 90 06/09/2023     06/09/2023     Lab Results   Component Value Date     06/09/2023    POTASSIUM 4.6 06/09/2023    CHLORIDE 102 06/09/2023    CO2 27 06/09/2023     (H) 06/10/2023     No results found for: MAG    Treatment-related toxicities (CTCAE v5.0):  Anorexia: Grade 1: Loss of appetite without alteration in eating habits  Fatigue: Grade 1: Fatigue relieved by rest  Nausea: Grade 0: No toxicity  Pain: Grade 1: Mild pain  Dry mouth: Grade 1: Symptomatic without significant dietary alteration; unstimulated saliva flow >0.2 mL/min  Dysphagia: Grade 1: Symptomatic, able to eat regular diet  Mucositis: Grade 2: Moderate pain; not interfering with oral intake; modified diet indicated  Dermatitis: Grade 0: No toxicity      ED visits/Hospitalizations: None    Missed Treatments: None    Mosaiq chart and setup information reviewed  IGRT images reviewed    Medication Review  Med Note: No changes indicated    Assessment:    Ms. 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  She is experiencing escalation of pain.    Plan:   1.  Continue treatment as planned  2.  Increase gabapentin to 900 mg 4 times daily  3.  Acetaminophen 1000 mg 3 times daily  4.  Magic mouthwash every 4 hours as needed for pain  5.  Guaifenesin 200 mg every 4 hours as needed phlegm  6.  Continue to monitor for pain control.  Patient may take an oxycodone tablet at night as needed.      Mari Dias  Department of Radiation Oncology  [facility]

## 2023-08-02 ENCOUNTER — APPOINTMENT (OUTPATIENT)
Dept: RADIATION ONCOLOGY | Facility: CLINIC | Age: 38
End: 2023-08-02
Attending: RADIOLOGY
Payer: OTHER GOVERNMENT

## 2023-08-02 PROCEDURE — 77014 PR CT GUIDE FOR PLACEMENT RADIATION THERAPY FIELDS: CPT | Mod: 26 | Performed by: RADIOLOGY

## 2023-08-02 PROCEDURE — 77386 HC IMRT TREATMENT DELIVERY, COMPLEX: CPT | Performed by: RADIOLOGY

## 2023-08-03 ENCOUNTER — APPOINTMENT (OUTPATIENT)
Dept: RADIATION ONCOLOGY | Facility: CLINIC | Age: 38
End: 2023-08-03
Attending: RADIOLOGY
Payer: OTHER GOVERNMENT

## 2023-08-03 ENCOUNTER — ALLIED HEALTH/NURSE VISIT (OUTPATIENT)
Dept: RADIATION ONCOLOGY | Facility: CLINIC | Age: 38
End: 2023-08-03
Attending: DIETITIAN, REGISTERED
Payer: OTHER GOVERNMENT

## 2023-08-03 ENCOUNTER — VIRTUAL VISIT (OUTPATIENT)
Dept: SPEECH THERAPY | Facility: CLINIC | Age: 38
End: 2023-08-03
Payer: OTHER GOVERNMENT

## 2023-08-03 DIAGNOSIS — C02.9 PRIMARY SQUAMOUS CELL CARCINOMA OF TONGUE (H): Primary | ICD-10-CM

## 2023-08-03 DIAGNOSIS — C06.9 SQUAMOUS CELL CARCINOMA OF ORAL CAVITY (H): ICD-10-CM

## 2023-08-03 DIAGNOSIS — C06.9 SQUAMOUS CELL CARCINOMA OF ORAL CAVITY (H): Primary | ICD-10-CM

## 2023-08-03 PROCEDURE — 77386 HC IMRT TREATMENT DELIVERY, COMPLEX: CPT | Performed by: RADIOLOGY

## 2023-08-03 PROCEDURE — 77014 PR CT GUIDE FOR PLACEMENT RADIATION THERAPY FIELDS: CPT | Mod: 26 | Performed by: RADIOLOGY

## 2023-08-03 PROCEDURE — 92526 ORAL FUNCTION THERAPY: CPT | Mod: GN | Performed by: SPEECH-LANGUAGE PATHOLOGIST

## 2023-08-03 NOTE — PROGRESS NOTES
Nutrition services:     Brief conversation with Kaya today after radiation.   She tells me that her throat pain has increased but she continues to eat pureed foods and oatmeal without difficulty.    She tells me that she has been feeling well thus far.     Weight trends: stable   Wt Readings from Last 10 Encounters:   08/01/23 101.2 kg (223 lb)   07/25/23 102.3 kg (225 lb 8 oz)   07/18/23 102.6 kg (226 lb 1.6 oz)   06/28/23 98.9 kg (218 lb)   06/28/23 99.3 kg (219 lb)   06/14/23 98.4 kg (217 lb)   06/09/23 103.6 kg (228 lb 6.3 oz)   05/31/23 103.9 kg (229 lb)     Monitoring/follow-up: one week    Kiarra Grayson RD, , LD  [facility]  [phone]

## 2023-08-04 ENCOUNTER — APPOINTMENT (OUTPATIENT)
Dept: RADIATION ONCOLOGY | Facility: CLINIC | Age: 38
End: 2023-08-04
Attending: RADIOLOGY
Payer: OTHER GOVERNMENT

## 2023-08-04 PROCEDURE — 77427 RADIATION TX MANAGEMENT X5: CPT | Performed by: RADIOLOGY

## 2023-08-04 PROCEDURE — 77336 RADIATION PHYSICS CONSULT: CPT | Performed by: RADIOLOGY

## 2023-08-04 PROCEDURE — 77014 PR CT GUIDE FOR PLACEMENT RADIATION THERAPY FIELDS: CPT | Mod: 26 | Performed by: RADIOLOGY

## 2023-08-04 PROCEDURE — 77386 HC IMRT TREATMENT DELIVERY, COMPLEX: CPT | Performed by: RADIOLOGY

## 2023-08-07 ENCOUNTER — APPOINTMENT (OUTPATIENT)
Dept: RADIATION ONCOLOGY | Facility: CLINIC | Age: 38
End: 2023-08-07
Attending: RADIOLOGY
Payer: OTHER GOVERNMENT

## 2023-08-07 PROCEDURE — 77014 PR CT GUIDE FOR PLACEMENT RADIATION THERAPY FIELDS: CPT | Mod: 26 | Performed by: RADIOLOGY

## 2023-08-07 PROCEDURE — 77386 HC IMRT TREATMENT DELIVERY, COMPLEX: CPT | Performed by: RADIOLOGY

## 2023-08-08 ENCOUNTER — APPOINTMENT (OUTPATIENT)
Dept: RADIATION ONCOLOGY | Facility: CLINIC | Age: 38
End: 2023-08-08
Attending: RADIOLOGY
Payer: OTHER GOVERNMENT

## 2023-08-08 VITALS — WEIGHT: 211 LBS | BODY MASS INDEX: 36.22 KG/M2

## 2023-08-08 DIAGNOSIS — C06.9 SQUAMOUS CELL CARCINOMA OF ORAL CAVITY (H): ICD-10-CM

## 2023-08-08 DIAGNOSIS — E86.0 DEHYDRATION: Primary | ICD-10-CM

## 2023-08-08 PROCEDURE — 77386 HC IMRT TREATMENT DELIVERY, COMPLEX: CPT | Performed by: RADIOLOGY

## 2023-08-08 RX ORDER — MEPERIDINE HYDROCHLORIDE 25 MG/ML
25 INJECTION INTRAMUSCULAR; INTRAVENOUS; SUBCUTANEOUS EVERY 30 MIN PRN
Status: CANCELLED | OUTPATIENT
Start: 2023-08-11

## 2023-08-08 RX ORDER — ALBUTEROL SULFATE 0.83 MG/ML
2.5 SOLUTION RESPIRATORY (INHALATION)
Status: CANCELLED | OUTPATIENT
Start: 2023-08-11

## 2023-08-08 RX ORDER — DIPHENHYDRAMINE HYDROCHLORIDE 50 MG/ML
50 INJECTION INTRAMUSCULAR; INTRAVENOUS
Status: CANCELLED
Start: 2023-08-11

## 2023-08-08 RX ORDER — ALBUTEROL SULFATE 90 UG/1
1-2 AEROSOL, METERED RESPIRATORY (INHALATION)
Status: CANCELLED
Start: 2023-08-11

## 2023-08-08 RX ORDER — METHYLPREDNISOLONE SODIUM SUCCINATE 125 MG/2ML
125 INJECTION, POWDER, LYOPHILIZED, FOR SOLUTION INTRAMUSCULAR; INTRAVENOUS
Status: CANCELLED
Start: 2023-08-11

## 2023-08-08 RX ORDER — EPINEPHRINE 1 MG/ML
0.3 INJECTION, SOLUTION, CONCENTRATE INTRAVENOUS EVERY 5 MIN PRN
Status: CANCELLED | OUTPATIENT
Start: 2023-08-11

## 2023-08-08 NOTE — PROGRESS NOTES
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 Wagner presents to clinic today for her weekly on-treatment visit.  She is having more pain which is limiting her oral intake.  She has increased her gabapentin to 900 mg 4 times daily.  She is using Magic mouthwash.  She is taking acetaminophen.  She is most bothered by increased mucus production.  She finds sugar-free Sprite to be helpful in loosening some of the mucoid secretions.  She is also taking guaifenesin.    Nursing ROS:   Nutrition Alteration  Diet Type: Patient's Preference  Skin  Skin Reaction: 0 - No changes  Skin Intervention: Using aquaphor     ENT and Mouth Exam  Mucositis - Current: 0 - None   Cardiovascular  Respiratory effort: 1 - Normal - without distress     Pain Assessment  0-10 Pain Scale: 3  Pain Treatment: Gabapentin/Tylenol     PEG Tube:  No  Electronic Cardiac Implant: No     Objective:   Wt 95.7 kg (211 lb)   BMI 36.22 kg/m  , initial weight 102.56 kg (226 pounds)  Gen: Appears well, fatigued  HEENT: She continues to have shallow ulceration along the lateral aspect of the left oral tongue now measuring approximately 2 cm in length without induration, no thrush, minimal diffuse mucositis  Skin: Minimal erythema    Laboratory:  No new studies    Treatment-related toxicities (CTCAE v5.0):  Anorexia: Grade 2: Oral intake altered without significant weight loss or malnutrition; oral nutritional supplements indicated  Fatigue: Grade 2: Fatigue not relieved by rest; limiting instrumental ADL  Nausea: Grade 0: No toxicity  Pain: Grade  2: Moderate pain; limiting instrumental ADL  Dry mouth: Grade 1: Symptomatic without significant dietary alteration; unstimulated saliva flow >0.2 mL/min  Dysphagia: Grade 2: Symptomatic and altered eating/swallowing  Mucositis: Grade 2: Moderate pain; not interfering with oral intake; modified diet indicated  Dermatitis: Grade 0: No toxicity      ED visits/Hospitalizations: None    Missed Treatments: None    Mosaiq chart and setup information reviewed  IGRT images reviewed    Medication Review  Med Note: No changes indicated    Assessment:    Ms. 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  She is experiencing escalation of pain with associated weight loss..    Plan:   1.  Continue treatment as planned  2.  Continue gabapentin 900 mg 4 times daily  3.  Continue acetaminophen 1000 mg 3 times daily  4.  Continue Magic mouthwash every 4 hours as needed for pain  5.  Continue guaifenesin 200 mg every 4 hours as needed phlegm  6.  Continue to monitor for pain control.  Patient may take an oxycodone tablet at night as needed.  She has some oxycodone at home from her postoperative pain management.  7.  Discussed increase caloric intake.      Mari Dias  Department of Radiation Oncology  [facility]

## 2023-08-08 NOTE — LETTER
2023         RE: Kaya Wagner  [id number] [address] [address]        Dear Colleague,    Thank you for referring your patient, Kaya Wagner, to the [facility] RADIATION ONCOLOGY. Please see a copy of my visit note below.    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 Wagner presents to clinic today for her weekly on-treatment visit.  She is having more pain which is limiting her oral intake.  She has increased her gabapentin to 900 mg 4 times daily.  She is using Magic mouthwash.  She is taking acetaminophen.  She is most bothered by increased mucus production.  She finds sugar-free Sprite to be helpful in loosening some of the mucoid secretions.  She is also taking guaifenesin.    Nursing ROS:   Nutrition Alteration  Diet Type: Patient's Preference  Skin  Skin Reaction: 0 - No changes  Skin Intervention: Using aquaphor     ENT and Mouth Exam  Mucositis - Current: 0 - None   Cardiovascular  Respiratory effort: 1 - Normal - without distress     Pain Assessment  0-10 Pain Scale: 3  Pain Treatment: Gabapentin/Tylenol     PEG Tube:  No  Electronic Cardiac Implant: No     Objective:   Wt 95.7 kg (211 lb)   BMI 36.22 kg/m  , initial weight 102.56 kg (226 pounds)  Gen: Appears well, fatigued  HEENT: She continues to have shallow ulceration along the lateral aspect of the left oral tongue now measuring approximately 2 cm in length without induration, no thrush, minimal diffuse mucositis  Skin: Minimal erythema    Laboratory:  No new studies    Treatment-related toxicities  (CTCAE v5.0):  Anorexia: Grade 2: Oral intake altered without significant weight loss or malnutrition; oral nutritional supplements indicated  Fatigue: Grade 2: Fatigue not relieved by rest; limiting instrumental ADL  Nausea: Grade 0: No toxicity  Pain: Grade 2: Moderate pain; limiting instrumental ADL  Dry mouth: Grade 1: Symptomatic without significant dietary alteration; unstimulated saliva flow >0.2 mL/min  Dysphagia: Grade 2: Symptomatic and altered eating/swallowing  Mucositis: Grade 2: Moderate pain; not interfering with oral intake; modified diet indicated  Dermatitis: Grade 0: No toxicity      ED visits/Hospitalizations: None    Missed Treatments: None    Mosaiq chart and setup information reviewed  IGRT images reviewed    Medication Review  Med Note: No changes indicated    Assessment:    Ms. 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  She is experiencing escalation of pain with associated weight loss..    Plan:   1.  Continue treatment as planned  2.  Continue gabapentin 900 mg 4 times daily  3.  Continue acetaminophen 1000 mg 3 times daily  4.  Continue Magic mouthwash every 4 hours as needed for pain  5.  Continue guaifenesin 200 mg every 4 hours as needed phlegm  6.  Continue to monitor for pain control.  Patient may take an oxycodone tablet at night as needed.  She has some oxycodone at home from her postoperative pain management.  7.  Discussed increase caloric intake.      Mari Dias  Department of Radiation Oncology  [facility]

## 2023-08-09 ENCOUNTER — APPOINTMENT (OUTPATIENT)
Dept: RADIATION ONCOLOGY | Facility: CLINIC | Age: 38
End: 2023-08-09
Attending: RADIOLOGY
Payer: OTHER GOVERNMENT

## 2023-08-09 ENCOUNTER — VIRTUAL VISIT (OUTPATIENT)
Dept: SPEECH THERAPY | Facility: CLINIC | Age: 38
End: 2023-08-09
Payer: OTHER GOVERNMENT

## 2023-08-09 DIAGNOSIS — C02.9 PRIMARY SQUAMOUS CELL CARCINOMA OF TONGUE (H): Primary | ICD-10-CM

## 2023-08-09 DIAGNOSIS — R13.11 ORAL PHASE DYSPHAGIA: ICD-10-CM

## 2023-08-09 DIAGNOSIS — C06.9 SQUAMOUS CELL CARCINOMA OF ORAL CAVITY (H): ICD-10-CM

## 2023-08-09 PROCEDURE — 77386 HC IMRT TREATMENT DELIVERY, COMPLEX: CPT | Performed by: RADIOLOGY

## 2023-08-09 PROCEDURE — 77014 PR CT GUIDE FOR PLACEMENT RADIATION THERAPY FIELDS: CPT | Mod: 26 | Performed by: RADIOLOGY

## 2023-08-09 PROCEDURE — 92526 ORAL FUNCTION THERAPY: CPT | Mod: GN | Performed by: SPEECH-LANGUAGE PATHOLOGIST

## 2023-08-10 ENCOUNTER — APPOINTMENT (OUTPATIENT)
Dept: RADIATION ONCOLOGY | Facility: CLINIC | Age: 38
End: 2023-08-10
Attending: RADIOLOGY
Payer: OTHER GOVERNMENT

## 2023-08-10 PROCEDURE — 77386 HC IMRT TREATMENT DELIVERY, COMPLEX: CPT | Performed by: RADIOLOGY

## 2023-08-10 PROCEDURE — 77014 PR CT GUIDE FOR PLACEMENT RADIATION THERAPY FIELDS: CPT | Mod: 26 | Performed by: RADIOLOGY

## 2023-08-11 ENCOUNTER — APPOINTMENT (OUTPATIENT)
Dept: RADIATION ONCOLOGY | Facility: CLINIC | Age: 38
End: 2023-08-11
Attending: RADIOLOGY
Payer: OTHER GOVERNMENT

## 2023-08-11 PROCEDURE — 77014 PR CT GUIDE FOR PLACEMENT RADIATION THERAPY FIELDS: CPT | Mod: 26 | Performed by: RADIOLOGY

## 2023-08-11 PROCEDURE — 77427 RADIATION TX MANAGEMENT X5: CPT | Performed by: RADIOLOGY

## 2023-08-11 PROCEDURE — 77386 HC IMRT TREATMENT DELIVERY, COMPLEX: CPT | Performed by: RADIOLOGY

## 2023-08-11 PROCEDURE — 77336 RADIATION PHYSICS CONSULT: CPT | Performed by: RADIOLOGY

## 2023-08-12 ENCOUNTER — INFUSION THERAPY VISIT (OUTPATIENT)
Dept: ONCOLOGY | Facility: CLINIC | Age: 38
End: 2023-08-12
Attending: RADIOLOGY
Payer: OTHER GOVERNMENT

## 2023-08-12 VITALS
RESPIRATION RATE: 16 BRPM | SYSTOLIC BLOOD PRESSURE: 112 MMHG | HEART RATE: 58 BPM | OXYGEN SATURATION: 98 % | DIASTOLIC BLOOD PRESSURE: 74 MMHG | TEMPERATURE: 98.1 F

## 2023-08-12 DIAGNOSIS — C06.9 SQUAMOUS CELL CARCINOMA OF ORAL CAVITY (H): Primary | ICD-10-CM

## 2023-08-12 DIAGNOSIS — E86.0 DEHYDRATION: ICD-10-CM

## 2023-08-12 PROCEDURE — 258N000003 HC RX IP 258 OP 636: Performed by: RADIOLOGY

## 2023-08-12 PROCEDURE — 96360 HYDRATION IV INFUSION INIT: CPT

## 2023-08-12 PROCEDURE — 96361 HYDRATE IV INFUSION ADD-ON: CPT

## 2023-08-12 RX ADMIN — SODIUM CHLORIDE 2000 ML: 9 INJECTION, SOLUTION INTRAVENOUS at 09:45

## 2023-08-12 ASSESSMENT — PAIN SCALES - GENERAL: PAINLEVEL: MILD PAIN (2)

## 2023-08-12 NOTE — PATIENT INSTRUCTIONS
[facility] Triage and after hours / weekends / holidays:  [phone]    Please call the triage or after hours line if you experience a temperature greater than or equal to 100.4, shaking chills, have uncontrolled nausea, vomiting and/or diarrhea, dizziness, shortness of breath, chest pain, bleeding, unexplained bruising, or if you have any other new/concerning symptoms, questions or concerns.      If you are having any concerning symptoms or wish to speak to a provider before your next infusion visit, please call triage to notify them so we can adequately serve you.     If you need a refill on a narcotic prescription or other medication, please call before your infusion appointment.                August 2023 Sunday Monday Tuesday Wednesday Thursday Friday Saturday             1    TREATMENT   1:45 PM   (30 min.)   [facility] ON[facility] Radiation Oncology    OTV   2:15 PM   (15 min.)   Mari Dias MD   [facility] Radiation Oncology 2    TREATMENT   1:45 PM   (30 min.)   [facility] Radiation Oncology 3    VIDEO VISIT RETURN  12:45 PM   (45 min.)   Dona Prabhakar SLP   [facility]    [facility] NUTRITION VISIT   1:45 PM   (30 min.)   Kiarra Marie RD   [facility] Radiation Oncology    TREATMENT   1:45 PM   (30 min.)   [facility] Radiation Oncology 4    TREATMENT   1:45 PM   (30 min.)   [facility] Radiation Oncology 5       6     7    TREATMENT   1:45 PM   (30 min.)   [facility] Radiation Oncology 8    TREATMENT   1:45 PM   (30 min.)   [facility] Radiation Oncology    OTV   2:15 PM   (15 min.)   Mari Dias MD   [facility] Radiation Oncology 9    VIDEO VISIT RETURN  12:30 PM   (45 min.)   Christina Morrison SLP     [facility]    TREATMENT   1:45 PM   (30 min.)   [facility] ON[facility] Radiation Oncology 10    TREATMENT   1:45 PM   (30 min.)   [facility] Radiation Oncology 11    TREATMENT   1:45 PM   (30 min.)   [facility] Radiation Oncology 12    ONC INFUSION 2 HR (120 MIN)   9:00 AM   (120 min.)   [facility] ONC INFUSION NURSE   [facility]   13     14    TREATMENT   1:45 PM   (30 min.)   [facility] Radiation Oncology 15    TREATMENT   1:45 PM   (30 min.)   [facility] Radiation Oncology    OTV   2:15 PM   (15 min.)   Mari Dias MD   [facility] Radiation Oncology 16    TREATMENT   1:45 PM   (30 min.)   [facility] Radiation Oncology 17    VIDEO VISIT RETURN  12:45 PM   (45 min.)   Christina Morrison SLP   [facility] NUTRITION VISIT   1:30 PM   (30 min.)   Kiarra Marie RD   [facility] Radiation Oncology    TREATMENT   1:45 PM   (30 min.)   [facility] Radiation Oncology 18    TREATMENT   1:45 PM   (30 min.)   [facility] Radiation Oncology 19    ONC INFUSION 2 HR (120 MIN)  11:00 AM   (120 min.)   [facility] ONC INFUSION NURSE   [facility]   20     21    TREATMENT   1:45 PM   (30 min.)   [facility] Radiation Oncology 22    TREATMENT   1:45 PM   (30 min.)   [facility] Radiation Oncology    OTV   2:15 PM   (15 min.)   Mari Dias MD   [facility] Radiation Oncology 23    TREATMENT   1:45 PM   (30 min.)   [facility] Radiation Oncology 24    VIDEO VISIT RETURN  12:45 PM    (45 min.)   Christina Morrison SLP   [facility] NUTRITION VISIT   1:45 PM   (30 min.)   Kiarra Marie RD   [facility] Radiation Oncology    TREATMENT   1:45 PM   (30 min.)   [facility] RAD ONC VARIAN   [facility] Radiation Oncology 25    TREATMENT   1:45 PM   (30 min.)   [facility] RAD ONC [facility] Radiation Oncology 26    ONC INFUSION 2 HR (120 MIN)  11:00 AM   (120 min.)    ONC INFUSION NURSE   [facility]   27     28 29 30 31 September 2023 Sunday Monday Tuesday Wednesday Thursday Friday Saturday                            1     2    ONC INFUSION 2 HR (120 MIN)  11:00 AM   (120 min.)    ONC INFUSION NURSE   [facility]   3     4     5     6     7     8     9       10     11     12     13     14     15     16       17     18     19     20     21     22     23       24     25     26     27     28     29     30                     Lab Results:  No results found for this or any previous visit (from the past 12 hour(s)).

## 2023-08-12 NOTE — PROGRESS NOTES
Infusion Nursing Note:  Kaya Wagner presents today for IVF.    Patient seen by provider today: No   present during visit today: Not Applicable.    Note: Patient arrives feeling fatigued. She is here today to receive IVF for the first time today due to not being able to keep up with oral fluids well due to her radiation she is receiving. She has mouth pain rated at a 2/10 this morning. Denies any other concerns/complaints.    Intravenous Access:  Peripheral IV placed.    Treatment Conditions:  Not Applicable.    Post Infusion Assessment:  Patient tolerated infusion without incident.  Blood return noted pre and post infusion.  Site patent and intact, free from redness, edema or discomfort.  No evidence of extravasations.  Access discontinued per protocol.     Discharge Plan:   Patient declined prescription refills.  Discharge instructions reviewed with: Patient.  Patient and/or family verbalized understanding of discharge instructions and all questions answered.  Copy of AVS reviewed with patient and/or family.  Patient will return 8/19/23 for next appointment.  Patient discharged in stable condition accompanied by: self.  Departure Mode: Ambulatory.      Kaila Melendez RN

## 2023-08-14 ENCOUNTER — APPOINTMENT (OUTPATIENT)
Dept: RADIATION ONCOLOGY | Facility: CLINIC | Age: 38
End: 2023-08-14
Attending: RADIOLOGY
Payer: OTHER GOVERNMENT

## 2023-08-14 PROCEDURE — 77386 HC IMRT TREATMENT DELIVERY, COMPLEX: CPT | Performed by: RADIOLOGY

## 2023-08-14 PROCEDURE — 77014 PR CT GUIDE FOR PLACEMENT RADIATION THERAPY FIELDS: CPT | Mod: 26 | Performed by: RADIOLOGY

## 2023-08-15 ENCOUNTER — OFFICE VISIT (OUTPATIENT)
Dept: RADIATION ONCOLOGY | Facility: CLINIC | Age: 38
End: 2023-08-15
Attending: RADIOLOGY
Payer: OTHER GOVERNMENT

## 2023-08-15 VITALS
BODY MASS INDEX: 35.87 KG/M2 | SYSTOLIC BLOOD PRESSURE: 109 MMHG | WEIGHT: 209 LBS | DIASTOLIC BLOOD PRESSURE: 74 MMHG | HEART RATE: 60 BPM

## 2023-08-15 DIAGNOSIS — C02.9 PRIMARY SQUAMOUS CELL CARCINOMA OF TONGUE (H): Primary | ICD-10-CM

## 2023-08-15 PROCEDURE — 77386 HC IMRT TREATMENT DELIVERY, COMPLEX: CPT | Performed by: RADIOLOGY

## 2023-08-15 NOTE — LETTER
8/15/2023         RE: Kaya Wagner  [id number] [address] [address]        Dear Colleague,    Thank you for referring your patient, Kaya Wagner, to the [facility] RADIATION ONCOLOGY. Please see a copy of my visit note below.    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 Wagner presents to clinic today for her weekly on-treatment visit.  She is having more pain which is limiting her oral intake.  She has increased her gabapentin to 900 mg 4 times daily.  She is using Magic mouthwash.  She is taking acetaminophen.  She is most bothered by increased mucus production but finds sugar-free carbonated soda helpful to loosen some of the mucoid secretions. She is also taking guaifenesin.  Eating has become more difficult.    Nursing ROS:   Nutrition Alteration  Diet Type: Patient's Preference  Skin  Skin Reaction: 1 - Faint erythema or dry desquamation  Skin Intervention: Using aquaphor     ENT and Mouth Exam  Mucositis - Current: 0 - None   Cardiovascular  Respiratory effort: 1 - Normal - without distress     Pain Assessment  0-10 Pain Scale: 3  Pain Treatment: Gabapentin/Tylenol     PEG Tube:  No  Electronic Cardiac Implant: No     Objective:   /74   Pulse 60   Wt 94.8 kg (209 lb)   BMI 35.87 kg/m  , initial weight 102.56 kg (226 pounds)  Gen: Appears well, fatigued  HEENT: She continues to have a shallow ulceration along the lateral aspect of the left oral tongue now measuring approximately 2 cm in length without induration, no thrush, minimal diffuse mucositis  Skin:  Moderate erythema, no desquamation    Laboratory:  No new studies    Treatment-related toxicities (CTCAE v5.0):  Anorexia: Grade 2: Oral intake altered without significant weight loss or malnutrition; oral nutritional supplements indicated  Fatigue: Grade 2: Fatigue not relieved by rest; limiting instrumental ADL  Nausea: Grade 0: No toxicity  Pain: Grade 2: Moderate pain; limiting instrumental ADL  Dry mouth: Grade 1: Symptomatic without significant dietary alteration; unstimulated saliva flow >0.2 mL/min  Dysphagia: Grade 2: Symptomatic and altered eating/swallowing  Mucositis: Grade 2: Moderate pain; not interfering with oral intake; modified diet indicated  Dermatitis: Grade 2: Moderate to brisk erythema; patchy moist desquamation, mostly confined to skin folds and creases; moderate erythema    ED visits/Hospitalizations: None    Missed Treatments: None    Mosaiq chart and setup information reviewed  IGRT images reviewed    Medication Review  Med Note: No changes indicated    Assessment:    Ms. 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  She is experiencing escalation of pain with associated weight loss.    Plan:   1.  Continue treatment as planned  2.  Continue gabapentin 900 mg 4 times daily  3.  Continue acetaminophen 1000 mg 3 times daily, may alternate with ibuprofen  4.  Continue Magic mouthwash every 4 hours as needed for pain  5.  Continue guaifenesin 200 mg every 4 hours as needed phlegm  6.  Continue to monitor for pain control.  Patient may take an oxycodone tablet at night as needed.  She has some oxycodone at home from her postoperative pain management.  7.  Discussed increase caloric intake.      Mari Dias  Department of Radiation Oncology  [facility]

## 2023-08-16 ENCOUNTER — APPOINTMENT (OUTPATIENT)
Dept: RADIATION ONCOLOGY | Facility: CLINIC | Age: 38
End: 2023-08-16
Attending: RADIOLOGY
Payer: OTHER GOVERNMENT

## 2023-08-16 PROCEDURE — 77386 HC IMRT TREATMENT DELIVERY, COMPLEX: CPT | Performed by: RADIOLOGY

## 2023-08-16 PROCEDURE — 77014 PR CT GUIDE FOR PLACEMENT RADIATION THERAPY FIELDS: CPT | Mod: 26 | Performed by: RADIOLOGY

## 2023-08-17 ENCOUNTER — VIRTUAL VISIT (OUTPATIENT)
Dept: SPEECH THERAPY | Facility: CLINIC | Age: 38
End: 2023-08-17
Payer: OTHER GOVERNMENT

## 2023-08-17 ENCOUNTER — APPOINTMENT (OUTPATIENT)
Dept: RADIATION ONCOLOGY | Facility: CLINIC | Age: 38
End: 2023-08-17
Attending: RADIOLOGY
Payer: OTHER GOVERNMENT

## 2023-08-17 DIAGNOSIS — C06.9 SQUAMOUS CELL CARCINOMA OF ORAL CAVITY (H): ICD-10-CM

## 2023-08-17 DIAGNOSIS — C06.9 SQUAMOUS CELL CARCINOMA OF ORAL CAVITY (H): Primary | ICD-10-CM

## 2023-08-17 DIAGNOSIS — C02.9 PRIMARY SQUAMOUS CELL CARCINOMA OF TONGUE (H): Primary | ICD-10-CM

## 2023-08-17 PROCEDURE — 92526 ORAL FUNCTION THERAPY: CPT | Mod: GN | Performed by: SPEECH-LANGUAGE PATHOLOGIST

## 2023-08-17 PROCEDURE — 77386 HC IMRT TREATMENT DELIVERY, COMPLEX: CPT | Performed by: RADIOLOGY

## 2023-08-17 PROCEDURE — 77014 PR CT GUIDE FOR PLACEMENT RADIATION THERAPY FIELDS: CPT | Mod: 26 | Performed by: STUDENT IN AN ORGANIZED HEALTH CARE EDUCATION/TRAINING PROGRAM

## 2023-08-17 PROCEDURE — 97803 MED NUTRITION INDIV SUBSEQ: CPT | Performed by: DIETITIAN, REGISTERED

## 2023-08-17 NOTE — PROGRESS NOTES
CLINICAL NUTRITION SERVICES - REASSESSMENT NOTE   EVALUATION OF PREVIOUS PLAN OF CARE:   Time spent with patient: 15 min    Visit Type: follow up   Pt accompanied by: self  Referring Physician: Arun 7/5/23  C06.9 (ICD-10-CM) - Squamous cell carcinoma of oral cavity (H)      NUTRITION HISTORY  Factors affecting nutrition intake include:none at this time  Current diet/appetite: general diet/good appetite and intake  Chemotherapy: No  Radiation: Started 7/17/23; 6 fractions remaining  Surgery history: R Neck dissection, partial glossectomy 6/8/23  PEG tube: No; previous NG tube    Monitoring from previous assessment:   -Food/Fluid intake - Kaya tells me that she has been only able to eat oatmeal.  She plans to start making her oatmeal with her Pro Performance Bulk 1340 powder from [facility].   She has looked for cream of rice but could not find it in [employer] or [employer].  She has tried bone broth but it has been burning her mouth.   Most foods have been making her gag due to increase phlegm.  She is aware of adequate hydration helping thin her saliva.   -Liquid meal replacement or supplement - Pro Performance Bulk 1340 powder from [facility]  -Weight trends - down ~20 lb x past 3 months  Wt Readings from Last 10 Encounters:   08/15/23 94.8 kg (209 lb)   08/08/23 95.7 kg (211 lb)   08/01/23 101.2 kg (223 lb)   07/25/23 102.3 kg (225 lb 8 oz)   07/18/23 102.6 kg (226 lb 1.6 oz)   06/28/23 98.9 kg (218 lb)   06/28/23 99.3 kg (219 lb)   06/14/23 98.4 kg (217 lb)   06/09/23 103.6 kg (228 lb 6.3 oz)   05/31/23 103.9 kg (229 lb)     Previous Goals:   1.  Aim for 5-6 small frequent meals  2.  Aim for 1600kcal and 80g protein/day  3. Weight maintenance/no more than 2 lb wt loss per week.   Evaluation: Not met   Previous Nutrition Diagnosis:   Predicted suboptimal nutrient intake related to cancer treatment to head/neck region   Evaluation: Declining   NEW FINDINGS:   Weight loss   CURRENT NUTRITION DIAGNOSIS   Inadequate oral intake  related to thick saliva, mucositis as evidenced by 10 lb wt loss x past 2 weeks, patient consuming <50-75% estimated nutrition needs   INTERVENTIONS   Composition of Meals and Snacks and Medical Food Supplement - reviewed high pro/mary, soft/pureed foods.  Encouraged to try polenta (wheat free) as alternative to cream of rice.  Encouraged to use her bulk powder to make shakes/smoothies as well as adding to her oatmeal.  Encouraged to try Healios or Prashant to aid in healing her mouth sores.    Goals  1.  Aim for 5-6 small frequent meals  2.  Aim for 1600kcal and 80g protein/day  3. Weight maintenance/no more than 2 lb wt loss per week.   Follow-Up Plans: Pt has RD contact information for questions.    MONITORING AND EVALUATION:  -Food/beverage intake  -Weight trends  Kiarra Grayson RD, , LD  [facility]  [phone]

## 2023-08-18 ENCOUNTER — APPOINTMENT (OUTPATIENT)
Dept: RADIATION ONCOLOGY | Facility: CLINIC | Age: 38
End: 2023-08-18
Attending: RADIOLOGY
Payer: OTHER GOVERNMENT

## 2023-08-18 PROCEDURE — 77386 HC IMRT TREATMENT DELIVERY, COMPLEX: CPT | Performed by: RADIOLOGY

## 2023-08-18 PROCEDURE — 77427 RADIATION TX MANAGEMENT X5: CPT | Performed by: RADIOLOGY

## 2023-08-18 PROCEDURE — 77014 PR CT GUIDE FOR PLACEMENT RADIATION THERAPY FIELDS: CPT | Mod: 26 | Performed by: RADIOLOGY

## 2023-08-18 PROCEDURE — 77336 RADIATION PHYSICS CONSULT: CPT | Performed by: RADIOLOGY

## 2023-08-19 ENCOUNTER — INFUSION THERAPY VISIT (OUTPATIENT)
Dept: ONCOLOGY | Facility: CLINIC | Age: 38
End: 2023-08-19
Attending: RADIOLOGY
Payer: OTHER GOVERNMENT

## 2023-08-19 VITALS
DIASTOLIC BLOOD PRESSURE: 81 MMHG | HEART RATE: 64 BPM | RESPIRATION RATE: 16 BRPM | OXYGEN SATURATION: 98 % | SYSTOLIC BLOOD PRESSURE: 130 MMHG | TEMPERATURE: 98.1 F

## 2023-08-19 DIAGNOSIS — E86.0 DEHYDRATION: ICD-10-CM

## 2023-08-19 DIAGNOSIS — C06.9 SQUAMOUS CELL CARCINOMA OF ORAL CAVITY (H): Primary | ICD-10-CM

## 2023-08-19 PROCEDURE — 258N000003 HC RX IP 258 OP 636: Performed by: RADIOLOGY

## 2023-08-19 PROCEDURE — 96360 HYDRATION IV INFUSION INIT: CPT

## 2023-08-19 PROCEDURE — 96361 HYDRATE IV INFUSION ADD-ON: CPT

## 2023-08-19 RX ADMIN — SODIUM CHLORIDE 2000 ML: 9 INJECTION, SOLUTION INTRAVENOUS at 11:12

## 2023-08-19 ASSESSMENT — PAIN SCALES - GENERAL: PAINLEVEL: NO PAIN (0)

## 2023-08-19 NOTE — PROGRESS NOTES
Infusion Nursing Note:  Kaya Wagner presents today for IV fluids.    Patient seen by provider today: No   present during visit today: Not Applicable.    Note: Kaya presents today feeling tired, but overall okay. Denies pain or nausea/vomiting. Does have loss of appetite related to radiation therapy, but is doing her best with PO intake. IV fluids last week seemed to help overall. Denies fevers/chills. Denies chest pain, SOB, cough, or dizziness/lightheadedness. Denies constipation/diarrhea. Denies urinary issues. Skin is reddened on chest/neck related to radiation therapy. Offers no new concerns at today's visit.      Intravenous Access:  Peripheral IV placed.    Treatment Conditions:  Not Applicable.      Post Infusion Assessment:  Patient tolerated infusion without incident.  Blood return noted pre and post infusion.  Site patent and intact, free from redness, edema or discomfort.  No evidence of extravasations.  Access discontinued per protocol.       Discharge Plan:   Patient declined prescription refills.  Discharge instructions reviewed with: Patient.  Patient and/or family verbalized understanding of discharge instructions and all questions answered.  AVS to patient via [employer].  Patient will return 08/26 for next infusion appointment.   Patient discharged in stable condition accompanied by: self.  Departure Mode: Ambulatory.      Marline Ann RN

## 2023-08-19 NOTE — PATIENT INSTRUCTIONS
[facility] Triage and after hours / weekends / holidays:  [phone]    Please call the triage or after hours line if you experience a temperature greater than or equal to 100.4, shaking chills, have uncontrolled nausea, vomiting and/or diarrhea, dizziness, shortness of breath, chest pain, bleeding, unexplained bruising, or if you have any other new/concerning symptoms, questions or concerns.      If you are having any concerning symptoms or wish to speak to a provider before your next infusion visit, please call your care coordinator or triage to notify them so we can adequately serve you.     If you need a refill on a narcotic prescription or other medication, please call before your infusion appointment.

## 2023-08-21 ENCOUNTER — APPOINTMENT (OUTPATIENT)
Dept: RADIATION ONCOLOGY | Facility: CLINIC | Age: 38
End: 2023-08-21
Attending: RADIOLOGY
Payer: OTHER GOVERNMENT

## 2023-08-21 PROCEDURE — 77386 HC IMRT TREATMENT DELIVERY, COMPLEX: CPT | Performed by: RADIOLOGY

## 2023-08-21 PROCEDURE — 77014 PR CT GUIDE FOR PLACEMENT RADIATION THERAPY FIELDS: CPT | Mod: 26 | Performed by: RADIOLOGY

## 2023-08-21 NOTE — PROGRESS NOTES
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 Wagner presents to clinic today for her weekly on-treatment visit.  She is having more pain which is limiting her oral intake.  She has increased her gabapentin to 900 mg 4 times daily.  She is using Magic mouthwash.  She is taking acetaminophen.  She is most bothered by increased mucus production but finds sugar-free carbonated soda helpful to loosen some of the mucoid secretions. She is also taking guaifenesin.  Eating has become more difficult.    Nursing ROS:   Nutrition Alteration  Diet Type: Patient's Preference  Skin  Skin Reaction: 1 - Faint erythema or dry desquamation  Skin Intervention: Using aquaphor     ENT and Mouth Exam  Mucositis - Current: 0 - None   Cardiovascular  Respiratory effort: 1 - Normal - without distress     Pain Assessment  0-10 Pain Scale: 3  Pain Treatment: Gabapentin/Tylenol     PEG Tube:  No  Electronic Cardiac Implant: No     Objective:   /74   Pulse 60   Wt 94.8 kg (209 lb)   BMI 35.87 kg/m  , initial weight 102.56 kg (226 pounds)  Gen: Appears well, fatigued  HEENT: She continues to have a shallow ulceration along the lateral aspect of the left oral tongue now measuring approximately 2 cm in length without induration, no thrush, minimal diffuse mucositis  Skin: Moderate erythema, no desquamation    Laboratory:  No new studies    Treatment-related toxicities (CTCAE v5.0):  Anorexia: Grade 2: Oral intake altered without significant weight loss or malnutrition; oral nutritional supplements indicated  Fatigue: Grade  2: Fatigue not relieved by rest; limiting instrumental ADL  Nausea: Grade 0: No toxicity  Pain: Grade 2: Moderate pain; limiting instrumental ADL  Dry mouth: Grade 1: Symptomatic without significant dietary alteration; unstimulated saliva flow >0.2 mL/min  Dysphagia: Grade 2: Symptomatic and altered eating/swallowing  Mucositis: Grade 2: Moderate pain; not interfering with oral intake; modified diet indicated  Dermatitis: Grade 2: Moderate to brisk erythema; patchy moist desquamation, mostly confined to skin folds and creases; moderate erythema    ED visits/Hospitalizations: None    Missed Treatments: None    Mosaiq chart and setup information reviewed  IGRT images reviewed    Medication Review  Med Note: No changes indicated    Assessment:    Ms. 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  She is experiencing escalation of pain with associated weight loss.    Plan:   1.  Continue treatment as planned  2.  Continue gabapentin 900 mg 4 times daily  3.  Continue acetaminophen 1000 mg 3 times daily, may alternate with ibuprofen  4.  Continue Magic mouthwash every 4 hours as needed for pain  5.  Continue guaifenesin 200 mg every 4 hours as needed phlegm  6.  Continue to monitor for pain control.  Patient may take an oxycodone tablet at night as needed.  She has some oxycodone at home from her postoperative pain management.  7.  Discussed increase caloric intake.      Mari Dias  Department of Radiation Oncology  [facility]

## 2023-08-22 ENCOUNTER — OFFICE VISIT (OUTPATIENT)
Dept: RADIATION ONCOLOGY | Facility: CLINIC | Age: 38
End: 2023-08-22
Attending: RADIOLOGY
Payer: OTHER GOVERNMENT

## 2023-08-22 VITALS
HEART RATE: 68 BPM | DIASTOLIC BLOOD PRESSURE: 76 MMHG | BODY MASS INDEX: 34.67 KG/M2 | SYSTOLIC BLOOD PRESSURE: 112 MMHG | WEIGHT: 202 LBS

## 2023-08-22 DIAGNOSIS — C06.9 SQUAMOUS CELL CARCINOMA OF ORAL CAVITY (H): Primary | ICD-10-CM

## 2023-08-22 PROCEDURE — 77386 HC IMRT TREATMENT DELIVERY, COMPLEX: CPT | Performed by: RADIOLOGY

## 2023-08-22 NOTE — PROGRESS NOTES
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 Wagner presents to clinic today for her weekly on-treatment visit.  She is having more pain which is limiting her oral intake.  She has increased her gabapentin to 900 mg 4 times daily.  She is using Magic mouthwash.  She is taking acetaminophen.  She is most bothered by increased mucus production but finds sugar-free carbonated soda helpful to loosen some of the mucoid secretions. She is also taking guaifenesin.  Eating has become more difficult and she finds it difficult to swallow much more than liquids and smoothies.    Nursing ROS:   Nutrition Alteration  Diet Type: Patient's Preference  Skin  Skin Reaction: 1 - Faint erythema or dry desquamation  Skin Intervention: Using aquaphor     ENT and Mouth Exam  Mucositis - Current: 0 - None   Cardiovascular  Respiratory effort: 1 - Normal - without distress  Gastrointestinal  Nausea: 1 - One to two episodes of nausea/24     Pain Assessment  0-10 Pain Scale: 6  Pain Treatment: Gabapentin/Tylenol     PEG Tube:  No  Electronic Cardiac Implant: No     Objective:   /76   Pulse 68   Wt 91.6 kg (202 lb)   BMI 34.67 kg/m  , initial weight 102.56 kg (226 pounds)  Gen: Appears well, fatigued  HEENT: She continues to have a shallow ulceration along the lateral aspect of the left oral tongue now measuring approximately 2 cm in length without induration, no thrush, minimal diffuse mucositis  Skin: Moderate erythema, no desquamation    Laboratory:  No new studies    Treatment-related toxicities (CTCAE  v5.0):  Anorexia: Grade 2: Oral intake altered without significant weight loss or malnutrition; oral nutritional supplements indicated  Fatigue: Grade 2: Fatigue not relieved by rest; limiting instrumental ADL  Nausea: Grade 0: No toxicity  Pain: Grade 2: Moderate pain; limiting instrumental ADL  Dry mouth: Grade 1: Symptomatic without significant dietary alteration; unstimulated saliva flow >0.2 mL/min  Dysphagia: Grade 2: Symptomatic and altered eating/swallowing  Mucositis: Grade 2: Moderate pain; not interfering with oral intake; modified diet indicated  Dermatitis: Grade 2: Moderate to brisk erythema; patchy moist desquamation, mostly confined to skin folds and creases; moderate erythema    ED visits/Hospitalizations: None    Missed Treatments: None    Mosaiq chart and setup information reviewed  IGRT images reviewed    Medication Review  Med Note: No changes indicated    Assessment:    Ms. 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  She is experiencing escalation of pain with associated weight loss.    Plan:   1.  Continue treatment as planned  2.  Continue gabapentin 900 mg 4 times daily  3.  Continue acetaminophen 1000 mg 3 times daily, may alternate with ibuprofen  4.  Continue Magic mouthwash every 4 hours as needed for pain  5.  Continue guaifenesin 200 mg every 4 hours as needed phlegm  6.  Continue to monitor for pain control.  Patient may take an oxycodone tablet at night as needed.  She has some oxycodone at home from her postoperative pain management.  7.  Discussed increase caloric intake.  8.  Continue oxycodone 5 to 10 mg every 4-6 hours as needed for pain.      Mari Dias  Department of Radiation Oncology  [facility]

## 2023-08-22 NOTE — LETTER
2023         RE: Kaya Wagenr  [address] [facility] [address]        Dear Colleague,    Thank you for referring your patient, Kaya Wagner, to the [facility] RADIATION ONCOLOGY. Please see a copy of my visit note below.    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 Wagner presents to clinic today for her weekly on-treatment visit.  She is having more pain which is limiting her oral intake.  She has increased her gabapentin to 900 mg 4 times daily.  She is using Magic mouthwash.  She is taking acetaminophen.  She is most bothered by increased mucus production but finds sugar-free carbonated soda helpful to loosen some of the mucoid secretions. She is also taking guaifenesin.  Eating has become more difficult and she finds it difficult to swallow much more than liquids and smoothies.    Nursing ROS:   Nutrition Alteration  Diet Type: Patient's Preference  Skin  Skin Reaction: 1 - Faint erythema or dry desquamation  Skin Intervention: Using aquaphor     ENT and Mouth Exam  Mucositis - Current: 0 - None   Cardiovascular  Respiratory effort: 1 - Normal - without distress  Gastrointestinal  Nausea: 1 - One to two episodes of nausea/24     Pain Assessment  0-10 Pain Scale: 6  Pain Treatment: Gabapentin/Tylenol     PEG Tube:  No  Electronic Cardiac Implant: No     Objective:   /76   Pulse 68   Wt 91.6 kg (202 lb)   BMI 34.67 kg/m  , initial weight 102.56 kg (226 pounds)  Gen: Appears well, fatigued  HEENT: She continues to have a shallow ulceration along the lateral  aspect of the left oral tongue now measuring approximately 2 cm in length without induration, no thrush, minimal diffuse mucositis  Skin: Moderate erythema, no desquamation    Laboratory:  No new studies    Treatment-related toxicities (CTCAE v5.0):  Anorexia: Grade 2: Oral intake altered without significant weight loss or malnutrition; oral nutritional supplements indicated  Fatigue: Grade 2: Fatigue not relieved by rest; limiting instrumental ADL  Nausea: Grade 0: No toxicity  Pain: Grade 2: Moderate pain; limiting instrumental ADL  Dry mouth: Grade 1: Symptomatic without significant dietary alteration; unstimulated saliva flow >0.2 mL/min  Dysphagia: Grade 2: Symptomatic and altered eating/swallowing  Mucositis: Grade 2: Moderate pain; not interfering with oral intake; modified diet indicated  Dermatitis: Grade 2: Moderate to brisk erythema; patchy moist desquamation, mostly confined to skin folds and creases; moderate erythema    ED visits/Hospitalizations: None    Missed Treatments: None    Mosaiq chart and setup information reviewed  IGRT images reviewed    Medication Review  Med Note: No changes indicated    Assessment:    Ms. 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  She is experiencing escalation of pain with associated weight loss.    Plan:   1.  Continue treatment as planned  2.  Continue gabapentin 900 mg 4 times daily  3.  Continue acetaminophen 1000 mg 3 times daily, may alternate with ibuprofen  4.  Continue Magic mouthwash every 4 hours as needed for pain  5.  Continue guaifenesin 200 mg every 4 hours as needed phlegm  6.  Continue to monitor for pain control.  Patient may take an oxycodone tablet at night as needed.  She has some oxycodone at home from her postoperative pain management.  7.  Discussed increase caloric  intake.  8.  Continue oxycodone 5 to 10 mg every 4-6 hours as needed for pain.      Mari Dias  Department of Radiation Oncology  [facility]

## 2023-08-23 ENCOUNTER — APPOINTMENT (OUTPATIENT)
Dept: RADIATION ONCOLOGY | Facility: CLINIC | Age: 38
End: 2023-08-23
Attending: RADIOLOGY
Payer: OTHER GOVERNMENT

## 2023-08-23 ENCOUNTER — TELEPHONE (OUTPATIENT)
Dept: OTOLARYNGOLOGY | Facility: CLINIC | Age: 38
End: 2023-08-23
Payer: OTHER GOVERNMENT

## 2023-08-23 PROCEDURE — 77014 PR CT GUIDE FOR PLACEMENT RADIATION THERAPY FIELDS: CPT | Mod: 26 | Performed by: RADIOLOGY

## 2023-08-23 PROCEDURE — 77386 HC IMRT TREATMENT DELIVERY, COMPLEX: CPT | Performed by: RADIOLOGY

## 2023-08-24 ENCOUNTER — APPOINTMENT (OUTPATIENT)
Dept: RADIATION ONCOLOGY | Facility: CLINIC | Age: 38
End: 2023-08-24
Attending: RADIOLOGY
Payer: OTHER GOVERNMENT

## 2023-08-24 ENCOUNTER — TELEPHONE (OUTPATIENT)
Dept: RADIATION ONCOLOGY | Facility: CLINIC | Age: 38
End: 2023-08-24

## 2023-08-24 ENCOUNTER — VIRTUAL VISIT (OUTPATIENT)
Dept: SPEECH THERAPY | Facility: CLINIC | Age: 38
End: 2023-08-24
Payer: OTHER GOVERNMENT

## 2023-08-24 DIAGNOSIS — R13.11 ORAL PHASE DYSPHAGIA: ICD-10-CM

## 2023-08-24 DIAGNOSIS — C06.9 SQUAMOUS CELL CARCINOMA OF ORAL CAVITY (H): ICD-10-CM

## 2023-08-24 DIAGNOSIS — G89.3 CANCER RELATED PAIN: ICD-10-CM

## 2023-08-24 DIAGNOSIS — C06.9 SQUAMOUS CELL CARCINOMA OF ORAL CAVITY (H): Primary | ICD-10-CM

## 2023-08-24 DIAGNOSIS — C02.9 PRIMARY SQUAMOUS CELL CARCINOMA OF TONGUE (H): Primary | ICD-10-CM

## 2023-08-24 PROCEDURE — 77386 HC IMRT TREATMENT DELIVERY, COMPLEX: CPT | Performed by: RADIOLOGY

## 2023-08-24 PROCEDURE — 77014 PR CT GUIDE FOR PLACEMENT RADIATION THERAPY FIELDS: CPT | Mod: 26 | Performed by: RADIOLOGY

## 2023-08-24 PROCEDURE — 99207 PR NO CHARGE LOS: CPT | Mod: VID | Performed by: SPEECH-LANGUAGE PATHOLOGIST

## 2023-08-24 RX ORDER — OXYCODONE HCL 5 MG/5 ML
5-10 SOLUTION, ORAL ORAL EVERY 4 HOURS PRN
Qty: 500 ML | Refills: 0 | Status: SHIPPED | OUTPATIENT
Start: 2023-08-24 | End: 2023-09-26

## 2023-08-24 NOTE — TELEPHONE ENCOUNTER
Amado Parrish Pt wanted to know if you had the letter ready for her. She stated that you told her that the letter would be ready by Monday since she had an meli today.  Let me know if any questions  Thank you

## 2023-08-24 NOTE — PROGRESS NOTES
CLINICAL NUTRITION SERVICES - BRIEF NOTE   EVALUATION OF PREVIOUS PLAN OF CARE:     Visit Type:  BRIEF follow up   Pt accompanied by: self (see in radiation clinic)  Referring Physician: Arun 7/5/23  C06.9 (ICD-10-CM) - Squamous cell carcinoma of oral cavity (H)      Chemotherapy: No  Radiation: Started 7/17/23; 1 fraction remaining  Surgery history: R Neck dissection, partial glossectomy 6/8/23  PEG tube: No; previous NG tube  Monitoring from previous assessment:   -Food/Fluid intake - Jennifers intake has been very low due to tongue pain and gag reflex.  She had one Premier protein shake and one bottle of water yesterday (150 mary, 30g protein)  She is trying to push the fluids but unable to get much more than 3-4 cups/day.    She is willing to try a higher calorie shake, Ensure Complete (samples provided in clinic today).  She is unable to drink her Pro Performance Bulk 1340 powder from [facility] anymore due to gag reflex.  Jude is relived to be completing her radiation tomorrow and encouraged to be able to start eating more in the next week or so.   She has not obtained any Healios or Prashant.    -Weight trends - down ~25 lb x past 3 months  Wt Readings from Last 16 Encounters:   08/22/23 91.6 kg (202 lb)   08/15/23 94.8 kg (209 lb)   08/08/23 95.7 kg (211 lb)   08/01/23 101.2 kg (223 lb)   07/25/23 102.3 kg (225 lb 8 oz)   07/18/23 102.6 kg (226 lb 1.6 oz)   06/28/23 98.9 kg (218 lb)   06/28/23 99.3 kg (219 lb)   06/14/23 98.4 kg (217 lb)   06/09/23 103.6 kg (228 lb 6.3 oz)   05/31/23 103.9 kg (229 lb)     Previous Goals:   1.  Aim for 5-6 small frequent meals  2.  Aim for 1600kcal and 80g protein/day  3. Weight maintenance/no more than 2 lb wt loss per week.   Evaluation: Not met   Previous Nutrition Diagnosis:   Inadequate oral intake related to thick saliva, mucositis as evidenced by 10 lb wt loss x past 2 weeks, patient consuming <50-75% estimated nutrition needs    Evaluation: Declining with ongoing wt loss   NEW  FINDINGS:   No new findings   CURRENT NUTRITION DIAGNOSIS  Inadequate oral intake related to thick saliva, gag reflex mucositis as evidenced by 20 lb wt loss x past 5 weeks, patient consuming <10% estimated nutrition needs     INTERVENTIONS   Medical Food Supplement - provided samples of Ensure complete. Encouraged to take higher mary shake as tolerated.  Reviewed soft/pureed foods to try.   Follow-Up Plans: Pt has RD contact information for questions.      Kiarra Grayson RD, , LD  [facility]  [phone]

## 2023-08-24 NOTE — PROGRESS NOTES
Pt seen for brief allied health visit today via video. Pt has two fractions of radiation left. Reports she is trying her best to take protein shakes and water, timing pain meds before but getting in enough nutrition is a struggle. Encouraged pt to continue trying in small amounts to get in what nutrition she can. Discussed following up in ~2-3 weeks. Pt agreeable. Time spent with patient: 4 minutes..    No charge for today's brief allied health visit.      MALLIKA rBasher MA (music), CCC-SLP   Speech Language Pathologist  [place] Trained Vocologist   [facility]  Dept. of Otolaryngology  Department of Rehabilitation Services  [address] [place] [id number]  Email: [email or web address]   Phone: [phone]  Pronouns: she/her/hers

## 2023-08-25 ENCOUNTER — APPOINTMENT (OUTPATIENT)
Dept: RADIATION ONCOLOGY | Facility: CLINIC | Age: 38
End: 2023-08-25
Attending: RADIOLOGY
Payer: OTHER GOVERNMENT

## 2023-08-25 PROCEDURE — 77386 HC IMRT TREATMENT DELIVERY, COMPLEX: CPT | Performed by: RADIOLOGY

## 2023-08-25 PROCEDURE — 77427 RADIATION TX MANAGEMENT X5: CPT | Performed by: RADIOLOGY

## 2023-08-25 PROCEDURE — 77336 RADIATION PHYSICS CONSULT: CPT | Performed by: RADIOLOGY

## 2023-08-25 PROCEDURE — 77014 PR CT GUIDE FOR PLACEMENT RADIATION THERAPY FIELDS: CPT | Mod: 26 | Performed by: RADIOLOGY

## 2023-08-26 ENCOUNTER — INFUSION THERAPY VISIT (OUTPATIENT)
Dept: ONCOLOGY | Facility: CLINIC | Age: 38
End: 2023-08-26
Attending: RADIOLOGY
Payer: OTHER GOVERNMENT

## 2023-08-26 VITALS
SYSTOLIC BLOOD PRESSURE: 125 MMHG | TEMPERATURE: 97.9 F | DIASTOLIC BLOOD PRESSURE: 76 MMHG | OXYGEN SATURATION: 97 % | HEART RATE: 67 BPM

## 2023-08-26 DIAGNOSIS — E86.0 DEHYDRATION: ICD-10-CM

## 2023-08-26 DIAGNOSIS — C06.9 SQUAMOUS CELL CARCINOMA OF ORAL CAVITY (H): Primary | ICD-10-CM

## 2023-08-26 PROCEDURE — 96360 HYDRATION IV INFUSION INIT: CPT

## 2023-08-26 PROCEDURE — 96361 HYDRATE IV INFUSION ADD-ON: CPT

## 2023-08-26 PROCEDURE — 258N000003 HC RX IP 258 OP 636: Performed by: RADIOLOGY

## 2023-08-26 RX ADMIN — SODIUM CHLORIDE 2000 ML: 9 INJECTION, SOLUTION INTRAVENOUS at 11:15

## 2023-08-26 ASSESSMENT — PAIN SCALES - GENERAL: PAINLEVEL: NO PAIN (0)

## 2023-08-26 NOTE — PROGRESS NOTES
Infusion Nursing Note:  Kaya Wagner presents today for IVF.    Patient seen by provider today: No   present during visit today: Not Applicable.    Note: Overall Kaya feels good today. Lots of secretions today which is not new for patient. Continued nausea/vomiting/anorexia/diarrhea. Otherwise no changes to health and patient does not attest to any abnormal pain/SOB/chest tightness/signs of infection/dizziness/sensation changes/bruising/swelling/constipation/urinary issues/vision or hearing changes.    Intravenous Access:  Peripheral IV placed.    Treatment Conditions:  Not Applicable.      Post Infusion Assessment:  Patient tolerated infusion without incident.  Blood return noted pre and post infusion.  Site patent and intact, free from redness, edema or discomfort.  No evidence of extravasations.  Access discontinued per protocol.       Discharge Plan:   Patient declined prescription refills.  Discharge instructions reviewed with: Patient and Family.  Patient and/or family verbalized understanding of discharge instructions and all questions answered.  AVS to patient via [employer].  Patient will return 9/2 for next appointment.   Patient discharged in stable condition accompanied by: self, mother, and father.  Departure Mode: Ambulatory.      Destiny Mcclellan RN

## 2023-08-26 NOTE — PATIENT INSTRUCTIONS
[facility] Triage and after hours / weekends / holidays:  [phone]    Please call the triage or after hours line if you experience a temperature greater than or equal to 100.4, shaking chills, have uncontrolled nausea, vomiting and/or diarrhea, dizziness, shortness of breath, chest pain, bleeding, unexplained bruising, or if you have any other new/concerning symptoms, questions or concerns.      If you are having any concerning symptoms or wish to speak to a provider before your next infusion visit, please call triage to notify them so we can adequately serve you.     If you need a refill on a narcotic prescription or other medication, please call before your infusion appointment.

## 2023-09-02 ENCOUNTER — INFUSION THERAPY VISIT (OUTPATIENT)
Dept: ONCOLOGY | Facility: CLINIC | Age: 38
End: 2023-09-02
Attending: RADIOLOGY
Payer: OTHER GOVERNMENT

## 2023-09-02 VITALS
OXYGEN SATURATION: 97 % | SYSTOLIC BLOOD PRESSURE: 107 MMHG | DIASTOLIC BLOOD PRESSURE: 69 MMHG | TEMPERATURE: 97.9 F | HEART RATE: 62 BPM | RESPIRATION RATE: 18 BRPM

## 2023-09-02 DIAGNOSIS — E86.0 DEHYDRATION: ICD-10-CM

## 2023-09-02 DIAGNOSIS — C06.9 SQUAMOUS CELL CARCINOMA OF ORAL CAVITY (H): Primary | ICD-10-CM

## 2023-09-02 PROCEDURE — 96361 HYDRATE IV INFUSION ADD-ON: CPT

## 2023-09-02 PROCEDURE — 258N000003 HC RX IP 258 OP 636: Performed by: RADIOLOGY

## 2023-09-02 PROCEDURE — 96360 HYDRATION IV INFUSION INIT: CPT

## 2023-09-02 RX ADMIN — SODIUM CHLORIDE 2000 ML: 9 INJECTION, SOLUTION INTRAVENOUS at 11:24

## 2023-09-02 NOTE — PROGRESS NOTES
Infusion Nursing Note:  Kaya Wagner presents today for IV fluids.    Patient seen by provider today: No   present during visit today: Not Applicable.    Note: Patient reports pain 2/10 in her mouth today.  Declined the need for any interventions for pain while in infusion, will continue on home meds as ordered. Patient states she vomited this am prior to infusion and last evening after trying to drink supplement. States she isn't nauseated, vomiting is from all of her mucus/secretions.  Taking small amounts of water. Denies any dizziness.  Patient encouraged to call if symptoms not improving as today is her last scheduled appointment for IV fluids.  Patient verbalized understanding.       Intravenous Access:  Peripheral IV placed.    Treatment Conditions:  Not Applicable.      Post Infusion Assessment:  Patient tolerated infusion without incident.  Blood return noted pre and post infusion.  Site patent and intact, free from redness, edema or discomfort.  No evidence of extravasations.  Access discontinued per protocol.       Discharge Plan:   Patient declined prescription refills.  Discharge instructions reviewed with: Patient.  Patient and/or family verbalized understanding of discharge instructions and all questions answered.  AVS to patient via [employer].  Patient will return 9/11 for next provider appointment.   Patient discharged in stable condition accompanied by: self.  Departure Mode: Ambulatory.      Chelo Shepherd RN

## 2023-09-02 NOTE — PATIENT INSTRUCTIONS
[facility] Triage and after hours / weekends / holidays:  [phone]    Please call the triage or after hours line if you experience a temperature greater than or equal to 100.4, shaking chills, have uncontrolled nausea, vomiting and/or diarrhea, dizziness, shortness of breath, chest pain, bleeding, unexplained bruising, or if you have any other new/concerning symptoms, questions or concerns.      If you are having any concerning symptoms or wish to speak to a provider before your next infusion visit, please call triage to notify them so we can adequately serve you.     If you need a refill on a narcotic prescription or other medication, please call before your infusion appointment.           September 2023 Sunday Monday Tuesday Wednesday Thursday Friday Saturday                            1     2    ONC INFUSION 2 HR (120 MIN)  11:00 AM   (120 min.)    ONC INFUSION NURSE   [facility]   3     4     5     6     7     8     9       10     11    RETURN RADIATION ONCOLOGY   3:00 PM   (45 min.)   Yee Mckeon APRN CNP   [facility] Radiation Oncology 12    VIDEO VISIT RETURN   2:30 PM   (45 min.)   Christina Morriosn SLP   [facility] 13     14     15     16       17     18     19     20     21     22     23       24     25     26     27     28     29     30                 October 2023 Sunday Monday Tuesday Wednesday Thursday Friday Saturday   1     2     3     4    RETURN   4:00 PM   (15 min.)   Jabier London MD   [facility] [place] [address]  Happy Birthday!     6     7       8     9     10     11     12     13     14       15     16     17     18     19     20     21       22     23     24     25     26     27     28       29     30     31                                      No results found for this or any previous visit (from the past 24 hour(s)).

## 2023-09-11 ENCOUNTER — OFFICE VISIT (OUTPATIENT)
Dept: RADIATION ONCOLOGY | Facility: CLINIC | Age: 38
End: 2023-09-11
Attending: RADIOLOGY
Payer: OTHER GOVERNMENT

## 2023-09-11 VITALS
SYSTOLIC BLOOD PRESSURE: 112 MMHG | DIASTOLIC BLOOD PRESSURE: 75 MMHG | WEIGHT: 192 LBS | BODY MASS INDEX: 32.96 KG/M2 | HEART RATE: 84 BPM

## 2023-09-11 DIAGNOSIS — C06.9 SQUAMOUS CELL CARCINOMA OF ORAL CAVITY (H): Primary | ICD-10-CM

## 2023-09-11 PROCEDURE — 99024 POSTOP FOLLOW-UP VISIT: CPT | Performed by: NURSE PRACTITIONER

## 2023-09-11 PROCEDURE — G0463 HOSPITAL OUTPT CLINIC VISIT: HCPCS | Performed by: NURSE PRACTITIONER

## 2023-09-11 NOTE — LETTER
2023         RE: Kaya Wagner  [address] [facility]  [place] [address]        Dear Colleague,    Thank you for referring your patient, Kaya Wagner, to the [facility] RADIATION ONCOLOGY. Please see a copy of my visit note below.    FOLLOW-UP VISIT    Patient Name: Kaya Wagner      : 1985     Age: 37 year old        ______________________________________________________________________________     Chief Complaint   Patient presents with    Cancer     Follow up:Tongue Cancer:Right oral cavity and bilateral necks 6000 cGy completed 23     Pain  Denies    Labs  Other Labs: No    Imaging  None      Dental:   Most Recent Dental Visit: No  Trays: Frequency Will start using    Speech/Swallowing:   Most Recent evaluation or testin23  Swallowing Restrictions: soft foods    Trismus/Jaw Exercises: Yes    Nutrition:    Weight:   Wt Readings from Last 3 Encounters:   23 91.6 kg (202 lb)   08/15/23 94.8 kg (209 lb)   23 95.7 kg (211 lb)         Oral Symptoms:   Xerostomia:1- Symptomatic without significant dietary alteration; unstimulated saliva flow >0.2 ml/min  Dysphagia: 1- Symtomatic, able to eat regulat diet  Mucositis Oral Symptoms: 1- Asymptomatic or mild symptoms; intervention not indicated  Mucositis: 1- Erythema of the mucosa  Esophagitis:0- None    Other Appointments:     MD Name: Dr London Appointment Date: 10/04/23   MD Name: Appointment Date:   MD Name: Appointment Date:   Other Appointment Notes:     Residual Radiation side effect: Thick saliva, dry mouth, mouth pain, fatigue                 Radiation Oncology Follow-up Visit  2023    Kaya Wagner  MRN: [id number]   : 1985     DISEASE TREATED:   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  Overall she is starting to see improvement in her symptoms.  She is off of the gabapentin now.  She is taking oxycodone 5 mg as needed in the evening for pain.  She still has a lot of phlegm but has noticed that starting to decrease.  She does not have much of an appetite and her weight is down.  She is trying to eat soft foods and is occasionally doing a protein shake.  Her skin has healed well.  She is doing salt and soda rinses.  She is doing swallowing exercises.  She has not been doing her fluoride trays as her mouth is just too sore.    ROS:  Complete review of systems is negative except for symptoms discussed in subjective above.    Current Outpatient Medications   Medication    acetaminophen (TYLENOL) 160 MG/5ML solution    chlorhexidine (PERIDEX) 0.12 % solution    famotidine (PEPCID) 20 MG tablet    gabapentin (NEURONTIN) 300 MG capsule    guaiFENesin (ROBITUSSIN) 20 mg/mL liquid    magic mouthwash suspension, diphenhydrAMINE, lidocaine, aluminum-magnesium & simethicone, (FIRST-MOUTHWASH BLM) compounding kit    metFORMIN (GLUCOPHAGE) 1000 MG tablet    mineral oil-hydrophilic petrolatum (AQUAPHOR) external ointment    oxyCODONE (ROXICODONE) 5 MG tablet    oxyCODONE (ROXICODONE) 5 MG/5ML solution    polyethylene glycol (MIRALAX) 17 GM/Dose powder    senna-docusate (SENOKOT-S/PERICOLACE) 8.6-50 MG tablet    sertraline (ZOLOFT) 50 MG tablet     No current facility-administered medications for this visit.          Allergies   Allergen Reactions    Dust Mites     Gluten Meal Diarrhea, Nausea, Nausea and Vomiting and Other (See Comments)     Vomiting and diarrhea      Maple Tree Unknown    Pollen Extract        Past Medical History:   Diagnosis Date    Squamous cell cancer of tongue (H)          PHYSICAL EXAM:  /75   Pulse 84   Wt 87.1 kg (192 lb)   BMI 32.96  kg/m    Gen: Alert, in NAD  Eyes: PERRL, EOMI, sclera anicteric  HENT     Head: NC/AT     Ears: TM's clear, no external lesions.     Oral Cavity/Oropharynx: Dry mucous membranes with thickened secretions. No signs of thrush.  Mucositis looks to be fairly well-healed.  Neck: Mild erythema of the bilateral neck without desquamation..  Very mild submental lymphedema. No palpable cervical adenopathy.  Pulm: No wheezing, stridor or respiratory distress  CV: Well-perfused, no cyanosis  Musculoskeletal: Normal bulk and tone   Skin: Neck as described above otherwise normal color and turgor.      LABS AND IMAGING:  Reviewed.    IMPRESSION:   Ms. Wagner is a 37 year old female with a SCC of the right oral tongue s/p radiation now 3 weeks out since completion of treatment and doing well with slow improvement of acute side effects.    PLAN:    Follow up with Dr. Dias in 1 month.   Continue close follow up with ENT.  Continue to moisturize with aquaphor and when skin is completely healed can change to preferred moisturizer.  Discussed importance of sun avoidance or sun protection.  Fatigue should continue to improve.  Continue oral cares with salt and soda rinses.  Routine dental follow up at least every 6 months.  Continue to use fluoride trays or fluoride treatments. Continue jaw, neck and swallowing exercises.  Treatment related pain should continue to diminish.  Recommended to slowly wean off pain medications when pain decreases.  She did not need a refill today.  Continue to push fluids and caloric intake to maintain weight.  Discussed really trying to get adequate calories and to promote healing.      Yee Mckeon NP   Radiation Oncology

## 2023-09-11 NOTE — PROGRESS NOTES
FOLLOW-UP VISIT    Patient Name: Kaya Wagner      : 1985     Age: 37 year old        ______________________________________________________________________________     Chief Complaint   Patient presents with    Cancer     Follow up:Tongue Cancer:Right oral cavity and bilateral necks 6000 cGy completed 23     Pain  Denies    Labs  Other Labs: No    Imaging  None      Dental:   Most Recent Dental Visit: No  Trays: Frequency Will start using    Speech/Swallowing:   Most Recent evaluation or testin23  Swallowing Restrictions: soft foods    Trismus/Jaw Exercises: Yes    Nutrition:    Weight:   Wt Readings from Last 3 Encounters:   23 91.6 kg (202 lb)   08/15/23 94.8 kg (209 lb)   23 95.7 kg (211 lb)         Oral Symptoms:   Xerostomia:1- Symptomatic without significant dietary alteration; unstimulated saliva flow >0.2 ml/min  Dysphagia: 1- Symtomatic, able to eat regulat diet  Mucositis Oral Symptoms: 1- Asymptomatic or mild symptoms; intervention not indicated  Mucositis: 1- Erythema of the mucosa  Esophagitis:0- None    Other Appointments:     MD Name: Dr London Appointment Date: 10/04/23   MD Name: Appointment Date:   MD Name: Appointment Date:   Other Appointment Notes:     Residual Radiation side effect: Thick saliva, dry mouth, mouth pain, fatigue

## 2023-09-12 ENCOUNTER — VIRTUAL VISIT (OUTPATIENT)
Dept: SPEECH THERAPY | Facility: CLINIC | Age: 38
End: 2023-09-12
Payer: OTHER GOVERNMENT

## 2023-09-12 DIAGNOSIS — C06.9 SQUAMOUS CELL CARCINOMA OF ORAL CAVITY (H): ICD-10-CM

## 2023-09-12 DIAGNOSIS — C02.9 PRIMARY SQUAMOUS CELL CARCINOMA OF TONGUE (H): Primary | ICD-10-CM

## 2023-09-12 DIAGNOSIS — R13.11 ORAL PHASE DYSPHAGIA: ICD-10-CM

## 2023-09-12 PROCEDURE — 92526 ORAL FUNCTION THERAPY: CPT | Mod: GN | Performed by: SPEECH-LANGUAGE PATHOLOGIST

## 2023-09-12 NOTE — PROGRESS NOTES
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  Overall she is starting to see improvement in her symptoms.  She is off of the gabapentin now.  She is taking oxycodone 5 mg as needed in the evening for pain.  She still has a lot of phlegm but has noticed that starting to decrease.  She does not have much of an appetite and her weight is down.  She is trying to eat soft foods and is occasionally doing a protein shake.  Her skin has healed well.  She is doing salt and soda rinses.  She is doing swallowing exercises.  She has not been doing her fluoride trays as her mouth is just too sore.    ROS:  Complete review of systems is negative except for symptoms discussed in subjective above.    Current Outpatient Medications   Medication    acetaminophen (TYLENOL) 160 MG/5ML solution    chlorhexidine (PERIDEX) 0.12 % solution    famotidine (PEPCID) 20 MG tablet    gabapentin (NEURONTIN) 300 MG capsule    guaiFENesin (ROBITUSSIN) 20 mg/mL liquid    magic mouthwash suspension, diphenhydrAMINE, lidocaine, aluminum-magnesium & simethicone, (FIRST-MOUTHWASH BLM) compounding kit    metFORMIN (GLUCOPHAGE) 1000 MG tablet    mineral oil-hydrophilic petrolatum (AQUAPHOR) external ointment    oxyCODONE (ROXICODONE) 5 MG tablet    oxyCODONE (ROXICODONE) 5 MG/5ML solution    polyethylene glycol (MIRALAX) 17 GM/Dose powder    senna-docusate (SENOKOT-S/PERICOLACE) 8.6-50 MG tablet    sertraline (ZOLOFT) 50 MG tablet     No current facility-administered medications for this visit.          Allergies    Allergen Reactions    Dust Mites     Gluten Meal Diarrhea, Nausea, Nausea and Vomiting and Other (See Comments)     Vomiting and diarrhea      Maple Tree Unknown    Pollen Extract        Past Medical History:   Diagnosis Date    Squamous cell cancer of tongue (H)          PHYSICAL EXAM:  /75   Pulse 84   Wt 87.1 kg (192 lb)   BMI 32.96 kg/m    Gen: Alert, in NAD  Eyes: PERRL, EOMI, sclera anicteric  HENT     Head: NC/AT     Ears: TM's clear, no external lesions.     Oral Cavity/Oropharynx: Dry mucous membranes with thickened secretions. No signs of thrush.  Mucositis looks to be fairly well-healed.  Neck: Mild erythema of the bilateral neck without desquamation..  Very mild submental lymphedema. No palpable cervical adenopathy.  Pulm: No wheezing, stridor or respiratory distress  CV: Well-perfused, no cyanosis  Musculoskeletal: Normal bulk and tone   Skin: Neck as described above otherwise normal color and turgor.      LABS AND IMAGING:  Reviewed.    IMPRESSION:   Ms. Wagner is a 37 year old female with a SCC of the right oral tongue s/p radiation now 3 weeks out since completion of treatment and doing well with slow improvement of acute side effects.    PLAN:    Follow up with Dr. Dias in 1 month.   Continue close follow up with ENT.  Continue to moisturize with aquaphor and when skin is completely healed can change to preferred moisturizer.  Discussed importance of sun avoidance or sun protection.  Fatigue should continue to improve.  Continue oral cares with salt and soda rinses.  Routine dental follow up at least every 6 months.  Continue to use fluoride trays or fluoride treatments. Continue jaw, neck and swallowing exercises.  Treatment related pain should continue to diminish.  Recommended to slowly wean off pain medications when pain decreases.  She did not need a refill today.  Continue to push fluids and caloric intake to maintain weight.  Discussed really trying to get adequate calories  and to promote healing.      Yee Mckeon NP   Radiation Oncology

## 2023-09-26 ENCOUNTER — VIRTUAL VISIT (OUTPATIENT)
Dept: SPEECH THERAPY | Facility: CLINIC | Age: 38
End: 2023-09-26
Payer: OTHER GOVERNMENT

## 2023-09-26 DIAGNOSIS — C06.9 SQUAMOUS CELL CARCINOMA OF ORAL CAVITY (H): ICD-10-CM

## 2023-09-26 DIAGNOSIS — C02.9 PRIMARY SQUAMOUS CELL CARCINOMA OF TONGUE (H): Primary | ICD-10-CM

## 2023-09-26 DIAGNOSIS — R13.11 ORAL PHASE DYSPHAGIA: ICD-10-CM

## 2023-09-26 DIAGNOSIS — G89.3 CANCER RELATED PAIN: ICD-10-CM

## 2023-09-26 PROCEDURE — 92526 ORAL FUNCTION THERAPY: CPT | Mod: GN | Performed by: SPEECH-LANGUAGE PATHOLOGIST

## 2023-09-26 RX ORDER — OXYCODONE HCL 5 MG/5 ML
5-10 SOLUTION, ORAL ORAL EVERY 4 HOURS PRN
Qty: 250 ML | Refills: 0 | Status: SHIPPED | OUTPATIENT
Start: 2023-09-26 | End: 2024-02-18

## 2023-10-04 ENCOUNTER — OFFICE VISIT (OUTPATIENT)
Dept: RADIATION ONCOLOGY | Facility: CLINIC | Age: 38
End: 2023-10-04
Attending: RADIOLOGY
Payer: OTHER GOVERNMENT

## 2023-10-04 ENCOUNTER — OFFICE VISIT (OUTPATIENT)
Dept: OTOLARYNGOLOGY | Facility: CLINIC | Age: 38
End: 2023-10-04
Payer: OTHER GOVERNMENT

## 2023-10-04 ENCOUNTER — THERAPY VISIT (OUTPATIENT)
Dept: SPEECH THERAPY | Facility: CLINIC | Age: 38
End: 2023-10-04
Payer: OTHER GOVERNMENT

## 2023-10-04 VITALS
HEART RATE: 47 BPM | OXYGEN SATURATION: 98 % | WEIGHT: 187 LBS | TEMPERATURE: 97.9 F | DIASTOLIC BLOOD PRESSURE: 68 MMHG | HEIGHT: 64 IN | BODY MASS INDEX: 31.92 KG/M2 | SYSTOLIC BLOOD PRESSURE: 125 MMHG

## 2023-10-04 VITALS
RESPIRATION RATE: 18 BRPM | WEIGHT: 188 LBS | BODY MASS INDEX: 32.27 KG/M2 | SYSTOLIC BLOOD PRESSURE: 122 MMHG | DIASTOLIC BLOOD PRESSURE: 74 MMHG | OXYGEN SATURATION: 98 % | HEART RATE: 50 BPM

## 2023-10-04 DIAGNOSIS — C02.9 PRIMARY SQUAMOUS CELL CARCINOMA OF TONGUE (H): Primary | ICD-10-CM

## 2023-10-04 DIAGNOSIS — C06.9 SQUAMOUS CELL CARCINOMA OF ORAL CAVITY (H): ICD-10-CM

## 2023-10-04 DIAGNOSIS — I89.0 ACQUIRED LYMPHEDEMA: Primary | ICD-10-CM

## 2023-10-04 DIAGNOSIS — R13.11 ORAL PHASE DYSPHAGIA: ICD-10-CM

## 2023-10-04 PROCEDURE — 92526 ORAL FUNCTION THERAPY: CPT | Mod: GN | Performed by: SPEECH-LANGUAGE PATHOLOGIST

## 2023-10-04 PROCEDURE — 99024 POSTOP FOLLOW-UP VISIT: CPT | Performed by: RADIOLOGY

## 2023-10-04 PROCEDURE — G0463 HOSPITAL OUTPT CLINIC VISIT: HCPCS | Performed by: RADIOLOGY

## 2023-10-04 PROCEDURE — 99213 OFFICE O/P EST LOW 20 MIN: CPT | Performed by: RADIOLOGY

## 2023-10-04 PROCEDURE — 99213 OFFICE O/P EST LOW 20 MIN: CPT | Mod: GC | Performed by: OTOLARYNGOLOGY

## 2023-10-04 ASSESSMENT — PAIN SCALES - GENERAL
PAINLEVEL: MILD PAIN (2)
PAINLEVEL: MILD PAIN (2)

## 2023-10-04 NOTE — NURSING NOTE
"Chief Complaint   Patient presents with    RECHECK     6 week follow up    Blood pressure 125/68, pulse (!) 47, temperature 97.9  F (36.6  C), height 1.626 m (5' 4\"), weight 84.8 kg (187 lb), SpO2 98 %, currently breastfeeding.    .  Jonathan Moses LPN    "

## 2023-10-04 NOTE — Clinical Note
10/4/2023         RE: Kaya Wagner  [address]  [place] [address]        Dear Colleague,    Thank you for referring your patient, Kaya Wagner, to the [facility] RADIATION ONCOLOGY. Please see a copy of my visit note below.    FOLLOW-UP VISIT    Patient Name: Kaya Wagner      : 1985     Age: 37 year old        ______________________________________________________________________________     Chief Complaint   Patient presents with    Cancer     Follow up:Tongue Cancer:Right oral cavity and bilateral necks 6000 cGy completed 23       Pain  Pt rates her current throat/R frontal inner gum pain at a 2/10, but states it can get as high as 8/10.  Pt reports taking Children's Tylenol 1-2 times per day, as well as Oxycodone 10 mg at bedtime, and sometimes another time during the day.    Labs  Other Labs: No    Imaging  None      Dental:   Most Recent Dental Visit: No  Trays: Frequency Using toothpaste    Speech/Swallowing:   Most Recent evaluation or testing: today  Swallowing Restrictions: No difficulties with swallowing    Trismus/Jaw Exercises: Yes    Nutrition:    Wt Readings from Last 3 Encounters:   23 87.1 kg (192 lb)   23 91.6 kg (202 lb)   08/15/23 94.8 kg (209 lb)         Oral Symptoms:   Xerostomia:1- Symptomatic without significant dietary alteration; unstimulated saliva flow >0.2 ml/min  Dysphagia: 0-None  Mucositis Oral Symptoms: 0-None  Mucositis: 0- None  Esophagitis:0- None    Other Appointments:     MD Name: Dr London Appointment Date: 10/04/23   MD Name: Appointment Date:   MD Name: Appointment Date:   Other Appointment Notes:     Residual Radiation side effect: Fatigue                   Again, thank you for allowing me to participate in the care of your patient.        Sincerely,        Mari Dias MD

## 2023-10-04 NOTE — PROGRESS NOTES
FOLLOW-UP VISIT    Patient Name: Kaya Wagner      : 1985     Age: 37 year old        ______________________________________________________________________________     Chief Complaint   Patient presents with    Cancer     Follow up:Tongue Cancer:Right oral cavity and bilateral necks 6000 cGy completed 23       Pain  Pt rates her current throat/R frontal inner gum pain at a 2/10, but states it can get as high as 8/10.  Pt reports taking Children's Tylenol 1-2 times per day, as well as Oxycodone 10 mg at bedtime, and sometimes another time during the day.    Labs  Other Labs: No    Imaging  None      Dental:   Most Recent Dental Visit: No  Trays: Frequency Using toothpaste    Speech/Swallowing:   Most Recent evaluation or testing: today  Swallowing Restrictions: No difficulties with swallowing    Trismus/Jaw Exercises: Yes    Nutrition:    Wt Readings from Last 3 Encounters:   23 87.1 kg (192 lb)   23 91.6 kg (202 lb)   08/15/23 94.8 kg (209 lb)         Oral Symptoms:   Xerostomia:1- Symptomatic without significant dietary alteration; unstimulated saliva flow >0.2 ml/min  Dysphagia: 0-None  Mucositis Oral Symptoms: 0-None  Mucositis: 0- None  Esophagitis:0- None    Other Appointments:     MD Name: Dr London Appointment Date: 10/04/23   MD Name: Appointment Date:   MD Name: Appointment Date:   Other Appointment Notes:     Residual Radiation side effect: Fatigue

## 2023-10-04 NOTE — LETTER
"10/4/2023       RE: Kaya Wagner  [address] [facility] [address]     Dear Colleague,    Thank you for referring your patient, Kaya Wagner, to the [facility] EAR NOSE AND THROAT CLINIC [place] at [facility]. Please see a copy of my visit note below.    Otolaryngology Head and Neck Surgery Clinic Progress Note  October 4, 2023     Tumor Site: Right lateral tongue  Tumor Pathology: SCC   Tumor Stage: pT3N0  Tumor Treatment:   6/8/23 - Partial glossectomy, R MRND (IB-IV)  8/25/2023 - 6000 cGy completed       Brief History: Ms. Wagner is a 37-year-old female with a history of a right tongue squamous cell carcinoma.  She first noticed a sore on her right lateral tongue when she was 3 months pregnant, approximately 6 months ago. She was observed and this lesion was monitored for her pregnancy however it did not resolve. She underwent partial glossectomy with b/l neck dissection on 6/8 which demonstrated perineural invasion and T3 staging.    Subjective/Intvl events: No issues since prior visit. She finished radiation just over a month ago and is starting to feel better. No issues with eating or drinking. Does feel she started to have some neck lymphedema, which she would like therapy for. There are still a few sores in her mouth, which are improving.     O: /68   Pulse (!) 47   Temp 97.9  F (36.6  C)   Ht 1.626 m (5' 4\")   Wt 84.8 kg (187 lb)   SpO2 98%   BMI 32.10 kg/m    General - NAD, well-groomed. Accompanied by .her   Head/Face - Normocephalic, atraumatic. No lesions or scars. HB I/VI b/l.   Oral cavity - Right tongue is slightly tethered, does appear to be post-operative. There is a thin, linear ulceration along the right FOM just medial to the mandible consistent with radiation mucositis.   Oropharynx - Normal mucosa. Palate symmetric with normal elevation. No lesions or masses on inspection.  Neck - Apron incision is " well-healed, mild edema superior to this incision line consistent with lymphedema.  Normal laryngeal and tracheal landmarks.  The parotid beds were without masses.  No palpable thyroid nodules or cervical lymphadenopathy.  Normal range of motion.  Respiratory - Breathing comfortably on room air, no stridor, stertor, or exertion of accessory muscles. Voice strong.    A/P: Kaya Wagner is a 37 year old female with a past medical history of pT3N0 right tongue SCC now s/p wide local excision with right MRND (IB-IV) on 6/8 and adjuvant radiotherapy completed 8/25. She is convalescing well.     - Plan for 3 month post-treatment PET-CT in November  - Will see her back in 2 months following PET-CT, sooner if concerns  - Lymphedema therapy to be scheduled    -- Patient and above plan seen and discussed with Dr. London.     Marvin Marti MD  Otolaryngology-Head & Neck Surgery PGY3      Attestation signed by Jabier London MD at 10/11/2023 11:53 AM:  I, Jabier London MD, saw this patient with the resident/fellow and agree with the resident's findings and plan of care as documented in the resident's/fellow's note.      Again, thank you for allowing me to participate in the care of your patient.      Sincerely,    Jabier London MD

## 2023-10-04 NOTE — PROGRESS NOTES
"Otolaryngology Head and Neck Surgery Clinic Progress Note  October 4, 2023     Tumor Site: Right lateral tongue  Tumor Pathology: SCC   Tumor Stage: pT3N0  Tumor Treatment:   6/8/23 - Partial glossectomy, R MRND (IB-IV)  8/25/2023 - 6000 cGy completed       Brief History: Ms. Wagner is a 37-year-old female with a history of a right tongue squamous cell carcinoma.  She first noticed a sore on her right lateral tongue when she was 3 months pregnant, approximately 6 months ago. She was observed and this lesion was monitored for her pregnancy however it did not resolve. She underwent partial glossectomy with b/l neck dissection on 6/8 which demonstrated perineural invasion and T3 staging.    Subjective/Intvl events: No issues since prior visit. She finished radiation just over a month ago and is starting to feel better. No issues with eating or drinking. Does feel she started to have some neck lymphedema, which she would like therapy for. There are still a few sores in her mouth, which are improving.     O: /68   Pulse (!) 47   Temp 97.9  F (36.6  C)   Ht 1.626 m (5' 4\")   Wt 84.8 kg (187 lb)   SpO2 98%   BMI 32.10 kg/m    General - NAD, well-groomed. Accompanied by .her   Head/Face - Normocephalic, atraumatic. No lesions or scars. HB I/VI b/l.   Oral cavity - Right tongue is slightly tethered, does appear to be post-operative. There is a thin, linear ulceration along the right FOM just medial to the mandible consistent with radiation mucositis.   Oropharynx - Normal mucosa. Palate symmetric with normal elevation. No lesions or masses on inspection.  Neck - Apron incision is well-healed, mild edema superior to this incision line consistent with lymphedema.  Normal laryngeal and tracheal landmarks.  The parotid beds were without masses.  No palpable thyroid nodules or cervical lymphadenopathy.  Normal range of motion.  Respiratory - Breathing comfortably on room air, no stridor, stertor, or exertion " of accessory muscles. Voice strong.    A/P: Kaya Wagner is a 37 year old female with a past medical history of pT3N0 right tongue SCC now s/p wide local excision with right MRND (IB-IV) on 6/8 and adjuvant radiotherapy completed 8/25. She is convalescing well.     - Plan for 3 month post-treatment PET-CT in November  - Will see her back in 2 months following PET-CT, sooner if concerns  - Lymphedema therapy to be scheduled    -- Patient and above plan seen and discussed with Dr. London.     Marvin Marti MD  Otolaryngology-Head & Neck Surgery PGY3

## 2023-10-04 NOTE — PATIENT INSTRUCTIONS
1. Please follow-up in clinic in end of November with PET scan the same day.   2. Please call the ENT clinic with any questions,concerns, new or worsening symptoms.    -Clinic number is [phone]   - Shivani's direct line (Dr. London's nurse) [phone]

## 2023-10-09 ENCOUNTER — MYC MEDICAL ADVICE (OUTPATIENT)
Dept: OTOLARYNGOLOGY | Facility: CLINIC | Age: 38
End: 2023-10-09
Payer: OTHER GOVERNMENT

## 2023-10-17 ENCOUNTER — THERAPY VISIT (OUTPATIENT)
Dept: PHYSICAL THERAPY | Facility: CLINIC | Age: 38
End: 2023-10-17
Attending: RADIOLOGY
Payer: OTHER GOVERNMENT

## 2023-10-17 DIAGNOSIS — C06.9 SQUAMOUS CELL CARCINOMA OF ORAL CAVITY (H): ICD-10-CM

## 2023-10-17 DIAGNOSIS — I89.0 ACQUIRED LYMPHEDEMA: ICD-10-CM

## 2023-10-17 PROCEDURE — 97162 PT EVAL MOD COMPLEX 30 MIN: CPT | Mod: GP | Performed by: PHYSICAL THERAPIST

## 2023-10-17 PROCEDURE — 97140 MANUAL THERAPY 1/> REGIONS: CPT | Mod: GP | Performed by: PHYSICAL THERAPIST

## 2023-10-17 NOTE — PROGRESS NOTES
PHYSICAL THERAPY EVALUATION  Type of Visit: Evaluation    See electronic medical record for Abuse and Falls Screening details.    Subjective   Pt reporting swelling starting after radiation. Partial dissection to the R, bilateral Radiation. Full nutrition by mouth, continues to use soft textures per preference, but can consume full solids.        Presenting condition or subjective complaint: edema in neck post partial neck dissection  Date of onset: 23    Relevant medical history: Cancer; Depression; Mental Illness; Migraines or headaches; Overweight   Dates & types of surgery:  18,  3/30/23, partial Glossectomy, and partial radical neck dissection 23    Prior diagnostic imaging/testing results:       Prior therapy history for the same diagnosis, illness or injury: No      Prior Level of Function  Transfers: Independent  Ambulation: Independent  ADL: Independent  IADL: Childcare, Driving, Finances, Housekeeping, Laundry, Meal preparation, Medication management, Work    Living Environment  Social support: With a significant other or spouse   Type of home: House; Multi-level   Stairs to enter the home: No       Ramp: No   Stairs inside the home: Yes 12 Is there a railing: Yes   Help at home: None  Equipment owned:       Employment: Yes Addiction Counselor  Hobbies/Interests: time with family, TV/Movies, reading, music, cooking/baking    Patient goals for therapy: N/A    Pain assessment: Pain present  2/10 within mouth/ at jaw line       Objective       EDEMA EVALUATION  Additional history:  Body part affected by edema: neck  If cancer related, treatment: 30 radiation treatments and surgery  If not cancer related, problems with veins or cause of swelling:    Distance able to walk: a mile  Time able to stand: an hour  Sensation problems in hands/feet: No    Edema etiology: Cancer with lymph node dissection     FUNCTIONAL SCALES      Cognitive Status Examination  Orientation: Oriented to  person, place and time   Level of Consciousness: Alert  Follows Commands and Answers Questions: 100% of the time  Personal Safety and Judgement: Intact  Memory: Intact    EDEMA  Skin Condition: Intact, Non-pitting  Scar: Yes  Location: R lateral neck midline to posterior ear  Capillary Refill:   Radial Pulse:   Dorsal Pedal Pulse:   Stemmer Sign:   Ulceration: NA- No outward blistering post radiation      GIRTH MEASUREMENTS: Refer to separate girth measurement flowsheet.     VOLUME UE  Right LE (mL)    Left LE (mL)    LE Volume Comparison    % Difference    Head/Neck Volume  Please see Flowchart   Trunk Volume    Genital Volume       RANGE OF MOTION:  Mild limitation to cervical flexion and left rotation  STRENGTH:  WFL  POSTURE: WFL mild forward head  PALPATION: Non-tender, no pitting to head/neck  ACTIVITIES OF DAILY LIVING: IND  BED MOBILITY: Independent  TRANSFERS: Independent  GAIT/LOCOMOTION: IND, no device  BALANCE: WFL  SENSATION:  NA  VASCULAR:   COORDINATION:   MUSCLE TONE:     Assessment & Plan   CLINICAL IMPRESSIONS  Medical Diagnosis: Acquired lymphedema, Squamous cell carcinoma of oral cavity    Treatment Diagnosis: Lymphedema   Impression/Assessment: Patient is a 38 year old female with soft swelling of the head/ neck complaints.  The following significant findings have been identified: Pain and Edema. These impairments interfere with their ability to perform self care tasks as compared to previous level of function.     Clinical Decision Making (Complexity):  Clinical Presentation: Stable/Uncomplicated  Clinical Presentation Rationale: based on medical and personal factors listed in PT evaluation  Clinical Decision Making (Complexity): Moderate complexity    PLAN OF CARE  Treatment Interventions:  Interventions: Manual Therapy, Therapeutic Exercise, Self-Care/Home Management    Long Term Goals     PT Goal 1  Goal Identifier: Compression  Goal Description: Pt will obtain and utilize appropriate  compression to head/ neck to aid in edema reduction and management  Target Date: 01/15/24  PT Goal 2  Goal Identifier: HEP  Goal Description: Pt will demonstrate understanding and completion of targeted HEP of compression, self massage  and skin care to improve edema, reduce risk of infection and decrease cosmetic impact of facial edema  Target Date: 01/15/24  PT Goal 3  Goal Identifier: Volume/ Measurements  Goal Description: Pt will maintain or reduce circumfrential measurements to demonstrate reduced submandibular edema  Target Date: 01/15/24      Frequency of Treatment: 1x a week  Duration of Treatment: 90 days    Recommended Referrals to Other Professionals:   Education Assessment:   Learner/Method: Patient;Listening;Reading;Demonstration    Risks and benefits of evaluation/treatment have been explained.   Patient/Family/caregiver agrees with Plan of Care.     Evaluation Time:     PT Eval, Moderate Complexity Minutes (22348): 24       Signing Clinician: Precious Catherine, PT

## 2023-10-20 NOTE — PROGRESS NOTES
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  Since her last visit with us on 2023, she has improved.  She is able to eat more solid foods and is not having any difficulty with chewing and swallowing.  She is using her fluoride trays intermittently.  She has weaned off the gabapentin and is only using the oxycodone as needed at night for pain.  Her sense of taste has not returned.    PAST MEDICAL/SURGICAL HISTORY:   Past Medical History:   Diagnosis Date    Squamous cell cancer of tongue (H)       Past Surgical History:   Procedure Laterality Date    AS ESOPHAGOSCOPY, DIAGNOSTIC       SECTION      DISSECTION RADICAL NECK MODIFIED Right 2023    Procedure: right modfied radical neck dissection;  Surgeon: Jabier London MD;  Location: [facility] OR    GLOSSECTOMY PARTIAL Right 2023    Procedure: Right partial glossectomy, nasogastric tube placement;  Surgeon: Jabier London MD;  Location: [facility] OR       ALLERGIES:    Allergies   Allergen Reactions    Dust Mites     Gluten Meal Diarrhea, Nausea, Nausea and Vomiting and Other (See Comments)     Vomiting and diarrhea      Maple Tree Unknown    Pollen Extract        MEDICATIONS:   Current Outpatient Medications   Medication Sig Dispense Refill    acetaminophen (TYLENOL) 160 MG/5ML solution Take 31.25 mLs (1,000 mg) by mouth every 4 hours as needed for fever or mild pain 1000 mL 3    chlorhexidine (PERIDEX) 0.12 % solution Swish and spit 15 mLs in mouth 3 times daily  473 mL 0    famotidine (PEPCID) 20 MG tablet       gabapentin (NEURONTIN) 300 MG capsule Take 1 capsule (300 mg) by mouth 3 times daily Taper dose to 900 mg po tid as instructed on the taper instructions. Start on first day of radiation treatments 270 capsule 4    guaiFENesin (ROBITUSSIN) 20 mg/mL liquid Take 10 mLs (200 mg) by mouth every 4 hours as needed for cough 236 mL 11    magic mouthwash suspension, diphenhydrAMINE, lidocaine, aluminum-magnesium & simethicone, (FIRST-MOUTHWASH BLM) compounding kit Swish and swallow 5-10 mLs in mouth every 4 hours as needed for mouth sores 237 mL 11    metFORMIN (GLUCOPHAGE) 1000 MG tablet       mineral oil-hydrophilic petrolatum (AQUAPHOR) external ointment Apply topically 3 times daily 50 g 0    oxyCODONE (ROXICODONE) 5 MG tablet Take 1-2 tablets (5-10 mg) by mouth every 4 hours as needed for moderate pain 40 tablet 0    oxyCODONE (ROXICODONE) 5 MG/5ML solution Take 5-10 mLs (5-10 mg) by mouth every 4 hours as needed for pain 250 mL 0    polyethylene glycol (MIRALAX) 17 GM/Dose powder Take 17 g by mouth daily 510 g 0    senna-docusate (SENOKOT-S/PERICOLACE) 8.6-50 MG tablet Take 1 tablet by mouth 2 times daily 50 tablet 0    sertraline (ZOLOFT) 50 MG tablet Take 50 mg by mouth daily         REVIEW OF SYSTEMS: A 12-point review of systems was obtained. Pertinent findings are noted in the HPI and are otherwise unremarkable.     PHYSICAL EXAM:  VITALS: /74 (BP Location: Right arm)   Pulse 50   Resp 18   Wt 85.3 kg (188 lb)   SpO2 98%   BMI 32.27 kg/m    GEN: Alert, oriented, in no acute distress  HEENT: Normcephalic, oral cavity and oropharynx without mucositis or thrush, tacky mucous membranes, no discrete ulcerations visualized.  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  She is recovering well from the acute effects of radiation.  She is encouraged to advance her diet as tolerated.  She will continue for fluoride prophylaxis and to continue swallowing and stretching exercises.  She will follow-up with Dr. London in mid November after a 12-week PET CT scan.  We plan to see her again in 4 months.    A referral has been placed to lymphedema therapy.    45 minutes spent by me on the date of the encounter doing chart review, history and exam, documentation and further activities per the note.    Mari Dias MD  Radiation Oncology      CC:  Patient Care Team:  Storm Fitzpatrick MD as PCP - General (Family Medicine)  Jabier London MD as Assigned Surgical Provider  Mari Dias MD as MD (Radiation Oncology)  Jabier London MD as MD (Otolaryngology)  Mari Dias MD as Assigned Cancer Care Provider     This note was dictated with voice recognition software and then edited. Please excuse any unintentional errors.

## 2023-11-12 ENCOUNTER — MYC MEDICAL ADVICE (OUTPATIENT)
Dept: OTOLARYNGOLOGY | Facility: CLINIC | Age: 38
End: 2023-11-12
Payer: OTHER GOVERNMENT

## 2023-11-15 ENCOUNTER — OFFICE VISIT (OUTPATIENT)
Dept: OTOLARYNGOLOGY | Facility: CLINIC | Age: 38
End: 2023-11-15
Payer: OTHER GOVERNMENT

## 2023-11-15 ENCOUNTER — THERAPY VISIT (OUTPATIENT)
Dept: SPEECH THERAPY | Facility: CLINIC | Age: 38
End: 2023-11-15
Payer: OTHER GOVERNMENT

## 2023-11-15 VITALS
SYSTOLIC BLOOD PRESSURE: 122 MMHG | WEIGHT: 191 LBS | DIASTOLIC BLOOD PRESSURE: 74 MMHG | TEMPERATURE: 97.9 F | HEART RATE: 62 BPM | HEIGHT: 64 IN | BODY MASS INDEX: 32.61 KG/M2 | OXYGEN SATURATION: 100 %

## 2023-11-15 DIAGNOSIS — C06.9 SQUAMOUS CELL CARCINOMA OF ORAL CAVITY (H): ICD-10-CM

## 2023-11-15 DIAGNOSIS — R13.11 ORAL PHASE DYSPHAGIA: ICD-10-CM

## 2023-11-15 DIAGNOSIS — C02.9 PRIMARY SQUAMOUS CELL CARCINOMA OF TONGUE (H): Primary | ICD-10-CM

## 2023-11-15 PROCEDURE — 92526 ORAL FUNCTION THERAPY: CPT | Mod: GN | Performed by: SPEECH-LANGUAGE PATHOLOGIST

## 2023-11-15 PROCEDURE — 99213 OFFICE O/P EST LOW 20 MIN: CPT | Performed by: OTOLARYNGOLOGY

## 2023-11-15 ASSESSMENT — PAIN SCALES - GENERAL: PAINLEVEL: NO PAIN (0)

## 2023-11-15 NOTE — NURSING NOTE
"Chief Complaint   Patient presents with    RECHECK     Follow up with PET      Blood pressure 122/74, pulse 62, temperature 97.9  F (36.6  C), height 1.626 m (5' 4\"), weight 86.6 kg (191 lb), SpO2 100%, currently breastfeeding.  Jonathan Moses LPN    "

## 2023-11-15 NOTE — PROGRESS NOTES
Prior oncologic history:    Tumor Site: Right lateral tongue  Tumor Pathology: SCC   Tumor Stage: pT3N0  Tumor Treatment:   6/8/23 - Partial glossectomy, R MRND (IB-IV)    Interval history: The patient has just recently found out that she is pregnant.  She is happy about this news and has questions about whether her prior cancer treatment will affect the fetus at all.  I counseled her that it will not.  She currently is not having any issues in her mouth except for dryness.  She denies any odynophagia dysphagia hoarseness or otalgia.  She has not noticed any lumps or bumps in her neck.    Physical examination: The patient is well-appearing and in no distress.  Examination of the oral cavity reveals some volume loss and mild scarring between the right lateral tongue and the floor of mouth.  There were are some visible fasciculations.  The remainder of the tongue is normal.  The buccal mucosa floor of mouth and palate are normal also.  Palpation of the floor mouth tongue and base tongue are negative.  She cannot tart mirror exam.  Examination of the neck reveals a right neck scar consistent with a neck dissection.  Her marginal mandibular nerve function is normal.    Impression: TANISHA    Plan:  1.  The patient will follow-up in about 3 months.  2.  Patient will check with her primary care doctor and pharmacist if she is able to take Evoxac when she is pregnant and will let us know.    Jabier London M.D.

## 2023-11-16 NOTE — PATIENT INSTRUCTIONS
1. Please follow-up in clinic in 3 months.   2. Please call the ENT clinic with any questions,concerns, new or worsening symptoms.    -Clinic number is [phone]   - Shivani's direct line (Dr. London's nurse) [phone]

## 2023-11-22 NOTE — LETTER
11/15/2023       RE: Kaya Wagner  [id number] [facility] [id number]     Dear Colleague,    Thank you for referring your patient, Kaya Wagner, to the [facility] [place] [facility]. Please see a copy of my visit note below.    Prior oncologic history:    Tumor Site: Right lateral tongue  Tumor Pathology: SCC   Tumor Stage: pT3N0  Tumor Treatment:   6/8/23 - Partial glossectomy, R MRND (IB-IV)    Interval history: The patient has just recently found out that she is pregnant.  She is happy about this news and has questions about whether her prior cancer treatment will affect the fetus at all.  I counseled her that it will not.  She currently is not having any issues in her mouth except for dryness.  She denies any odynophagia dysphagia hoarseness or otalgia.  She has not noticed any lumps or bumps in her neck.    Physical examination: The patient is well-appearing and in no distress.  Examination of the oral cavity reveals some volume loss and mild scarring between the right lateral tongue and the floor of mouth.  There were are some visible fasciculations.  The remainder of the tongue is normal.  The buccal mucosa floor of mouth and palate are normal also.  Palpation of the floor mouth tongue and base tongue are negative.  She cannot tart mirror exam.  Examination of the neck reveals a right neck scar consistent with a neck dissection.  Her marginal mandibular nerve function is normal.    Impression: TANISHA    Plan:  1.  The patient will follow-up in about 3 months.  2.  Patient will check with her primary care doctor and pharmacist if she is able to take Evoxac when she is pregnant and will let us know.    Jabier London M.D.   Griseofulvin Counseling:  I discussed with the patient the risks of griseofulvin including but not limited to photosensitivity, cytopenia, liver damage, nausea/vomiting and severe allergy.  The patient understands that this medication is best absorbed when taken with a fatty meal (e.g., ice cream or french fries).

## 2023-11-29 ENCOUNTER — THERAPY VISIT (OUTPATIENT)
Dept: PHYSICAL THERAPY | Facility: CLINIC | Age: 38
End: 2023-11-29
Attending: RADIOLOGY
Payer: OTHER GOVERNMENT

## 2023-11-29 DIAGNOSIS — C06.9 SQUAMOUS CELL CARCINOMA OF ORAL CAVITY (H): ICD-10-CM

## 2023-11-29 DIAGNOSIS — I89.0 LYMPHEDEMA OF FACE: Primary | ICD-10-CM

## 2023-11-29 DIAGNOSIS — I89.0 LYMPHEDEMA: ICD-10-CM

## 2023-11-29 PROCEDURE — 97140 MANUAL THERAPY 1/> REGIONS: CPT | Mod: GP | Performed by: PHYSICAL THERAPIST

## 2023-12-06 ENCOUNTER — THERAPY VISIT (OUTPATIENT)
Dept: PHYSICAL THERAPY | Facility: CLINIC | Age: 38
End: 2023-12-06
Attending: RADIOLOGY
Payer: OTHER GOVERNMENT

## 2023-12-06 DIAGNOSIS — C06.9 SQUAMOUS CELL CARCINOMA OF ORAL CAVITY (H): Primary | ICD-10-CM

## 2023-12-06 PROCEDURE — 97140 MANUAL THERAPY 1/> REGIONS: CPT | Mod: GP | Performed by: PHYSICAL THERAPIST

## 2023-12-20 ENCOUNTER — THERAPY VISIT (OUTPATIENT)
Dept: PHYSICAL THERAPY | Facility: CLINIC | Age: 38
End: 2023-12-20
Attending: RADIOLOGY
Payer: OTHER GOVERNMENT

## 2023-12-20 DIAGNOSIS — C06.9 SQUAMOUS CELL CARCINOMA OF ORAL CAVITY (H): Primary | ICD-10-CM

## 2023-12-20 PROCEDURE — 97535 SELF CARE MNGMENT TRAINING: CPT | Mod: GP | Performed by: PHYSICAL THERAPIST

## 2023-12-20 PROCEDURE — 97140 MANUAL THERAPY 1/> REGIONS: CPT | Mod: GP | Performed by: PHYSICAL THERAPIST

## 2023-12-27 ENCOUNTER — THERAPY VISIT (OUTPATIENT)
Dept: PHYSICAL THERAPY | Facility: CLINIC | Age: 38
End: 2023-12-27
Attending: RADIOLOGY
Payer: OTHER GOVERNMENT

## 2023-12-27 DIAGNOSIS — C06.9 SQUAMOUS CELL CARCINOMA OF ORAL CAVITY (H): Primary | ICD-10-CM

## 2023-12-27 PROCEDURE — 97140 MANUAL THERAPY 1/> REGIONS: CPT | Mod: GP | Performed by: PHYSICAL THERAPIST

## 2024-01-10 ENCOUNTER — HOSPITAL ENCOUNTER (OUTPATIENT)
Dept: PET IMAGING | Facility: CLINIC | Age: 39
Discharge: HOME | End: 2024-01-10
Attending: OTOLARYNGOLOGY
Payer: OTHER GOVERNMENT

## 2024-01-10 ENCOUNTER — THERAPY VISIT (OUTPATIENT)
Dept: PHYSICAL THERAPY | Facility: CLINIC | Age: 39
End: 2024-01-10
Attending: RADIOLOGY
Payer: OTHER GOVERNMENT

## 2024-01-10 DIAGNOSIS — C02.9 PRIMARY SQUAMOUS CELL CARCINOMA OF TONGUE (H): ICD-10-CM

## 2024-01-10 DIAGNOSIS — C06.9 SQUAMOUS CELL CARCINOMA OF ORAL CAVITY (H): Primary | ICD-10-CM

## 2024-01-10 PROCEDURE — A9552 F18 FDG: HCPCS | Performed by: OTOLARYNGOLOGY

## 2024-01-10 PROCEDURE — 70491 CT SOFT TISSUE NECK W/DYE: CPT | Mod: 26 | Performed by: RADIOLOGY

## 2024-01-10 PROCEDURE — 343N000001 HC RX 343: Performed by: OTOLARYNGOLOGY

## 2024-01-10 PROCEDURE — 71260 CT THORAX DX C+: CPT

## 2024-01-10 PROCEDURE — 78815 PET IMAGE W/CT SKULL-THIGH: CPT | Mod: PS

## 2024-01-10 PROCEDURE — 71260 CT THORAX DX C+: CPT | Mod: 26 | Performed by: RADIOLOGY

## 2024-01-10 PROCEDURE — 74177 CT ABD & PELVIS W/CONTRAST: CPT | Mod: 26 | Performed by: RADIOLOGY

## 2024-01-10 PROCEDURE — 70491 CT SOFT TISSUE NECK W/DYE: CPT

## 2024-01-10 PROCEDURE — 250N000011 HC RX IP 250 OP 636: Performed by: OTOLARYNGOLOGY

## 2024-01-10 PROCEDURE — 78815 PET IMAGE W/CT SKULL-THIGH: CPT | Mod: 26 | Performed by: RADIOLOGY

## 2024-01-10 PROCEDURE — 97140 MANUAL THERAPY 1/> REGIONS: CPT | Mod: GP | Performed by: PHYSICAL THERAPIST

## 2024-01-10 RX ORDER — IOPAMIDOL 755 MG/ML
45-150 INJECTION, SOLUTION INTRAVASCULAR ONCE
Status: COMPLETED | OUTPATIENT
Start: 2024-01-10 | End: 2024-01-10

## 2024-01-10 RX ADMIN — IOPAMIDOL 118 ML: 755 INJECTION, SOLUTION INTRAVENOUS at 08:31

## 2024-01-10 RX ADMIN — FLUDEOXYGLUCOSE F-18 11.26 MILLICURIE: 500 INJECTION, SOLUTION INTRAVENOUS at 07:34

## 2024-01-12 ENCOUNTER — PATIENT OUTREACH (OUTPATIENT)
Dept: OTOLARYNGOLOGY | Facility: CLINIC | Age: 39
End: 2024-01-12
Payer: OTHER GOVERNMENT

## 2024-01-12 NOTE — TELEPHONE ENCOUNTER
Called and left message for patient to review the following PET scan results:    IMPRESSION:   In this patient with history of squamous cell carcinoma of the tongue:  1. Postsurgical changes within the right posterior lateral tongue  without residual or recurrent FDG avidity. Primary: NI-RADS 1  2. No cervical lymphadenopathy. Neck: NI-RADS 1  3. No evidence of distant metastatic disease.       Reviewed that there are no concerns seen on PET scan and advised that patient continue with plan for follow-up as scheduled on 2/21/24 with Dr. London. Left direct line for return call with further questions or concerns.       Shivani Montalvo, RN, BSN       I will STOP taking the medications listed below when I get home from the hospital:    apixaban 2.5 mg oral tablet  -- 1 tab(s) by mouth every 12 hours    simvastatin 20 mg oral tablet  -- 1 tab(s) by mouth once a day (at bedtime)    anastrozole 1 mg oral tablet  -- 1 tab(s) by mouth once a day    megestrol 40 mg/mL oral suspension  -- 20 milliliter(s) by mouth once a day    lidocaine 5% topical film  -- Apply on skin to affected area once a day, As Needed    omeprazole 40 mg oral delayed release capsule  -- 1 cap(s) by mouth once a day

## 2024-02-07 ENCOUNTER — OFFICE VISIT (OUTPATIENT)
Dept: RADIATION ONCOLOGY | Facility: CLINIC | Age: 39
End: 2024-02-07
Attending: RADIOLOGY
Payer: OTHER GOVERNMENT

## 2024-02-07 VITALS
HEART RATE: 78 BPM | BODY MASS INDEX: 34.67 KG/M2 | SYSTOLIC BLOOD PRESSURE: 129 MMHG | WEIGHT: 202 LBS | DIASTOLIC BLOOD PRESSURE: 76 MMHG

## 2024-02-07 DIAGNOSIS — C06.9 SQUAMOUS CELL CARCINOMA OF ORAL CAVITY (H): Primary | ICD-10-CM
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
## 2024-02-07 DIAGNOSIS — K11.7 XEROSTOMIA DUE TO RADIOTHERAPY: ICD-10-CM

## 2024-02-07 DIAGNOSIS — Z92.3 HISTORY OF THYROID IRRADIATION: ICD-10-CM

## 2024-02-07 PROCEDURE — G0463 HOSPITAL OUTPT CLINIC VISIT: HCPCS | Performed by: RADIOLOGY

## 2024-02-07 PROCEDURE — 99213 OFFICE O/P EST LOW 20 MIN: CPT | Performed by: RADIOLOGY

## 2024-02-07 PROCEDURE — 99215 OFFICE O/P EST HI 40 MIN: CPT | Performed by: RADIOLOGY

## 2024-02-07 RX ORDER — CEVIMELINE HYDROCHLORIDE 30 MG/1
30 CAPSULE ORAL 3 TIMES DAILY
Qty: 90 CAPSULE | Refills: 11 | Status: SHIPPED | OUTPATIENT
Start: 2024-02-07

## 2024-02-07 NOTE — PROGRESS NOTES
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  Since her last visit with us on 10/4/2023, she has continued to improve.  She is back to eating regular diet.  Her taste sensation has mostly returned.  She is not having any pain.  She continues to use her fluoride trays as directed.  She is most bothered by xerostomia which she is managing with increased fluid intake and eating foods with more sauces.  She is interested in medication to stimulate salivary flow.  She has weaned off of gabapentin.  She is doing lymphedema therapy intermittently.    PET CT scan on 1/10/2024 showed postsurgical changes within the right posterior lateral tongue without residual or recurrent FDG avidity.  There was no evidence of cervical lymphadenopathy and no evidence of distant metastatic disease.  She did undergo a D&C on 2023 for spontaneous  at 7 weeks 6 days gestation.    PAST MEDICAL/SURGICAL HISTORY:   Past Medical History:   Diagnosis Date    Squamous cell cancer of tongue (H)       Past Surgical History:   Procedure Laterality Date    AS ESOPHAGOSCOPY, DIAGNOSTIC       SECTION      DISSECTION RADICAL NECK MODIFIED Right 2023    Procedure: right modfied radical neck dissection;  Surgeon: Jabier London MD;  Location: [facility] OR    GLOSSECTOMY PARTIAL Right 2023    Procedure: Right partial glossectomy, nasogastric tube  placement;  Surgeon: Jabier London MD;  Location: [facility] OR     ALLERGIES:    Allergies   Allergen Reactions    Dust Mites     Gluten Meal Diarrhea, Nausea, Nausea and Vomiting and Other (See Comments)     Vomiting and diarrhea      Maple Tree Unknown    Pollen Extract      MEDICATIONS:   Current Outpatient Medications   Medication Sig Dispense Refill    acetaminophen (TYLENOL) 160 MG/5ML solution Take 31.25 mLs (1,000 mg) by mouth every 4 hours as needed for fever or mild pain (Patient not taking: Reported on 11/15/2023) 1000 mL 3    chlorhexidine (PERIDEX) 0.12 % solution Swish and spit 15 mLs in mouth 3 times daily (Patient not taking: Reported on 11/15/2023) 473 mL 0    famotidine (PEPCID) 20 MG tablet  (Patient not taking: Reported on 11/15/2023)      gabapentin (NEURONTIN) 300 MG capsule Take 1 capsule (300 mg) by mouth 3 times daily Taper dose to 900 mg po tid as instructed on the taper instructions. Start on first day of radiation treatments (Patient not taking: Reported on 11/15/2023) 270 capsule 4    guaiFENesin (ROBITUSSIN) 20 mg/mL liquid Take 10 mLs (200 mg) by mouth every 4 hours as needed for cough (Patient not taking: Reported on 11/15/2023) 236 mL 11    magic mouthwash suspension, diphenhydrAMINE, lidocaine, aluminum-magnesium & simethicone, (FIRST-MOUTHWASH BLM) compounding kit Swish and swallow 5-10 mLs in mouth every 4 hours as needed for mouth sores (Patient not taking: Reported on 11/15/2023) 237 mL 11    metFORMIN (GLUCOPHAGE) 1000 MG tablet  (Patient not taking: Reported on 11/15/2023)      mineral oil-hydrophilic petrolatum (AQUAPHOR) external ointment Apply topically 3 times daily (Patient not taking: Reported on 11/15/2023) 50 g 0    oxyCODONE (ROXICODONE) 5 MG tablet Take 1-2 tablets (5-10 mg) by mouth every 4 hours as needed for moderate pain (Patient not taking: Reported on 11/15/2023) 40 tablet 0    oxyCODONE (ROXICODONE) 5 MG/5ML solution Take 5-10 mLs (5-10 mg) by mouth every 4  hours as needed for pain (Patient not taking: Reported on 11/15/2023) 250 mL 0    polyethylene glycol (MIRALAX) 17 GM/Dose powder Take 17 g by mouth daily (Patient not taking: Reported on 11/15/2023) 510 g 0    senna-docusate (SENOKOT-S/PERICOLACE) 8.6-50 MG tablet Take 1 tablet by mouth 2 times daily (Patient not taking: Reported on 11/15/2023) 50 tablet 0    sertraline (ZOLOFT) 50 MG tablet Take 50 mg by mouth daily (Patient not taking: Reported on 11/15/2023)         REVIEW OF SYSTEMS: A 12-point review of systems was obtained. Pertinent findings are noted in the HPI and are otherwise unremarkable.     PHYSICAL EXAM:  VITALS: /76   Pulse 78   Wt 91.6 kg (202 lb)   BMI 34.67 kg/m    GEN: Alert, oriented, in no acute distress  HEENT: Normcephalic, oral cavity and oropharynx without mucositis or thrush, tacky mucous membranes, no discrete ulcerations visualized, no palpable mass of the oral tongue               .  Neck: No palpable adenopathy, mild/moderate submental lymphedema  CV: Regular rate, extremities warm, well-perfused  RESP: Comfortably on room air, no wheezing  ABDOMEN: Nondistended  SKIN: Resolution of radiation erythema, mild residual hyperpigmentation, no desquamation  MSK: No muscle bulk and tone  LYMPHATICS: No palpable cervical or supraclavicular adenopathy  NEURO: CN II-XII grossly intact, no focal neurologic deficit  PSYCH: Appropriate affect    LABS AND IMAGING: Please see history of present illness for PET/CT discussion.    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  She has recovered well from the acute effects of radiation. She will continue for fluoride prophylaxis and to continue swallowing and stretching exercises.  I discussed the swallowing exercises and stretching exercises need to continue over the course of her lifetime.  We  discussed some prophylaxis.  We also discussed that continuing lymphedema therapy on a regular basis for the next year or 2 will help her not have long-term tissue induration in the submental area of her neck.    She plans to see Dr. London in the near future.    Will prescribe Evoxac for xerostomia-30 mg 3 times daily.    TSH was ordered to be drawn when she sees Dr. London on 2/21/2024.    Follow-up in Radiation Oncology in 6 months.    45 minutes spent by me on the date of the encounter doing chart review, history and exam, documentation and further activities per the note.    Mari Dias MD  Radiation Oncology      CC:  Patient Care Team:  Storm Fitzpatrick MD as PCP - General (Family Medicine)  Jabier London MD as Assigned Surgical Provider  Mari Dias MD as MD (Radiation Oncology)  Jabier London MD as MD (Otolaryngology)  Mari Dias MD as Assigned Cancer Care Provider     This note was dictated with voice recognition software and then edited. Please excuse any unintentional errors.

## 2024-02-07 NOTE — LETTER
2024         RE: Kaya Wagner  [address] [facility] [address]        Dear Colleague,    Thank you for referring your patient, Kaya Wagner, to the [facility] RADIATION ONCOLOGY. Please see a copy of my visit note below.    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  Since her last visit with us on 10/4/2023, she has continued to improve.  She is back to eating regular diet.  Her taste sensation has mostly returned.  She is not having any pain.  She continues to use her fluoride trays as directed.  She is most bothered by xerostomia which she is managing with increased fluid intake and eating foods with more sauces.  She is interested in medication to stimulate salivary flow.  She has weaned off of gabapentin.  She is doing lymphedema therapy intermittently.    PET CT scan on 1/10/2024 showed postsurgical changes within the right posterior lateral tongue without residual or recurrent FDG avidity.  There was no evidence of cervical lymphadenopathy and no evidence of distant metastatic disease.  She did undergo a D&C on 2023 for spontaneous  at 7 weeks 6 days gestation.    PAST MEDICAL/SURGICAL HISTORY:   Past Medical History:   Diagnosis Date     Squamous cell cancer of tongue (H)       Past Surgical History:   Procedure Laterality Date     AS ESOPHAGOSCOPY, DIAGNOSTIC        SECTION        DISSECTION RADICAL NECK MODIFIED Right 6/8/2023    Procedure: right modfied radical neck dissection;  Surgeon: Jabier London MD;  Location: [facility] OR     GLOSSECTOMY PARTIAL Right 6/8/2023    Procedure: Right partial glossectomy, nasogastric tube placement;  Surgeon: Jabier London MD;  Location: [facility] OR     ALLERGIES:    Allergies   Allergen Reactions     Dust Mites      Gluten Meal Diarrhea, Nausea, Nausea and Vomiting and Other (See Comments)     Vomiting and diarrhea       Maple Tree Unknown     Pollen Extract      MEDICATIONS:   Current Outpatient Medications   Medication Sig Dispense Refill     acetaminophen (TYLENOL) 160 MG/5ML solution Take 31.25 mLs (1,000 mg) by mouth every 4 hours as needed for fever or mild pain (Patient not taking: Reported on 11/15/2023) 1000 mL 3     chlorhexidine (PERIDEX) 0.12 % solution Swish and spit 15 mLs in mouth 3 times daily (Patient not taking: Reported on 11/15/2023) 473 mL 0     famotidine (PEPCID) 20 MG tablet  (Patient not taking: Reported on 11/15/2023)       gabapentin (NEURONTIN) 300 MG capsule Take 1 capsule (300 mg) by mouth 3 times daily Taper dose to 900 mg po tid as instructed on the taper instructions. Start on first day of radiation treatments (Patient not taking: Reported on 11/15/2023) 270 capsule 4     guaiFENesin (ROBITUSSIN) 20 mg/mL liquid Take 10 mLs (200 mg) by mouth every 4 hours as needed for cough (Patient not taking: Reported on 11/15/2023) 236 mL 11     magic mouthwash suspension, diphenhydrAMINE, lidocaine, aluminum-magnesium & simethicone, (FIRST-MOUTHWASH BLM) compounding kit Swish and swallow 5-10 mLs in mouth every 4 hours as needed for mouth sores (Patient not taking: Reported on 11/15/2023) 237 mL 11     metFORMIN (GLUCOPHAGE) 1000 MG tablet  (Patient not taking: Reported on 11/15/2023)       mineral oil-hydrophilic petrolatum (AQUAPHOR) external ointment Apply topically 3 times daily (Patient not taking: Reported on 11/15/2023) 50  g 0     oxyCODONE (ROXICODONE) 5 MG tablet Take 1-2 tablets (5-10 mg) by mouth every 4 hours as needed for moderate pain (Patient not taking: Reported on 11/15/2023) 40 tablet 0     oxyCODONE (ROXICODONE) 5 MG/5ML solution Take 5-10 mLs (5-10 mg) by mouth every 4 hours as needed for pain (Patient not taking: Reported on 11/15/2023) 250 mL 0     polyethylene glycol (MIRALAX) 17 GM/Dose powder Take 17 g by mouth daily (Patient not taking: Reported on 11/15/2023) 510 g 0     senna-docusate (SENOKOT-S/PERICOLACE) 8.6-50 MG tablet Take 1 tablet by mouth 2 times daily (Patient not taking: Reported on 11/15/2023) 50 tablet 0     sertraline (ZOLOFT) 50 MG tablet Take 50 mg by mouth daily (Patient not taking: Reported on 11/15/2023)         REVIEW OF SYSTEMS: A 12-point review of systems was obtained. Pertinent findings are noted in the HPI and are otherwise unremarkable.     PHYSICAL EXAM:  VITALS: /76   Pulse 78   Wt 91.6 kg (202 lb)   BMI 34.67 kg/m    GEN: Alert, oriented, in no acute distress  HEENT: Normcephalic, oral cavity and oropharynx without mucositis or thrush, tacky mucous membranes, no discrete ulcerations visualized, no palpable mass of the oral tongue               .  Neck: No palpable adenopathy, mild/moderate submental lymphedema  CV: Regular rate, extremities warm, well-perfused  RESP: Comfortably on room air, no wheezing  ABDOMEN: Nondistended  SKIN: Resolution of radiation erythema, mild residual hyperpigmentation, no desquamation  MSK: No muscle bulk and tone  LYMPHATICS: No palpable cervical or supraclavicular adenopathy  NEURO: CN II-XII grossly intact, no focal neurologic deficit  PSYCH: Appropriate affect    LABS AND IMAGING: Please see history of present illness for PET/CT discussion.    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  She has  recovered well from the acute effects of radiation. She will continue for fluoride prophylaxis and to continue swallowing and stretching exercises.  I discussed the swallowing exercises and stretching exercises need to continue over the course of her lifetime.  We discussed some prophylaxis.  We also discussed that continuing lymphedema therapy on a regular basis for the next year or 2 will help her not have long-term tissue induration in the submental area of her neck.    She plans to see Dr. London in the near future.    Will prescribe Evoxac for xerostomia-30 mg 3 times daily.    TSH was ordered to be drawn when she sees Dr. London on 2024.    Follow-up in Radiation Oncology in 6 months.    45 minutes spent by me on the date of the encounter doing chart review, history and exam, documentation and further activities per the note.    Mari Dias MD  Radiation Oncology      CC:  Patient Care Team:  Storm Fitzpatrick MD as PCP - General (Family Medicine)  Jabier London MD as Assigned Surgical Provider  Mari Dias MD as MD (Radiation Oncology)  Jabier London MD as MD (Otolaryngology)  Mari Dias MD as Assigned Cancer Care Provider     This note was dictated with voice recognition software and then edited. Please excuse any unintentional errors.     FOLLOW-UP VISIT    Patient Name: Kaya Wagner      : 1985     Age: 38 year old        ______________________________________________________________________________     Chief Complaint   Patient presents with     Cancer     Follow up:Tongue Cancer:Right oral cavity and bilateral necks 6000 cGy completed 23     /76   Pulse 78   Wt 91.6 kg (202 lb)   BMI 34.67 kg/m         Pain  Denies    Labs  Other Labs: No    Imaging  CT: 01/10/24      Dental:   Most Recent Dental Visit: Yes    Speech/Swallowing:   Most Recent evaluation or testing: No  Swallowing Restrictions: No difficulties with  swallowing    Trismus/Jaw Exercises: Sometimes    Nutrition:    Weight:   Wt Readings from Last 3 Encounters:   02/07/24 91.6 kg (202 lb)   11/15/23 86.6 kg (191 lb)   10/04/23 84.8 kg (187 lb)         Oral Symptoms:   Xerostomia:1- Symptomatic without significant dietary alteration; unstimulated saliva flow >0.2 ml/min  Dysphagia: 0-None  Mucositis Oral Symptoms: 0-None  Mucositis: 0- None  Esophagitis:0- None    Other Appointments:     MD Name: Dr Londno Appointment Date: 02/28/24   MD Name: Appointment Date:   MD Name: Appointment Date:   Other Appointment Notes:     Residual Radiation side effect: Dry mouth           Again, thank you for allowing me to participate in the care of your patient.        Sincerely,        Mari Dias MD

## 2024-02-07 NOTE — PROGRESS NOTES
FOLLOW-UP VISIT    Patient Name: Kaya Wagner      : 1985     Age: 38 year old        ______________________________________________________________________________     Chief Complaint   Patient presents with    Cancer     Follow up:Tongue Cancer:Right oral cavity and bilateral necks 6000 cGy completed 23     /76   Pulse 78   Wt 91.6 kg (202 lb)   BMI 34.67 kg/m         Pain  Denies    Labs  Other Labs: No    Imaging  CT: 01/10/24      Dental:   Most Recent Dental Visit: Yes    Speech/Swallowing:   Most Recent evaluation or testing: No  Swallowing Restrictions: No difficulties with swallowing    Trismus/Jaw Exercises: Sometimes    Nutrition:    Weight:   Wt Readings from Last 3 Encounters:   24 91.6 kg (202 lb)   11/15/23 86.6 kg (191 lb)   10/04/23 84.8 kg (187 lb)         Oral Symptoms:   Xerostomia:1- Symptomatic without significant dietary alteration; unstimulated saliva flow >0.2 ml/min  Dysphagia: 0-None  Mucositis Oral Symptoms: 0-None  Mucositis: 0- None  Esophagitis:0- None    Other Appointments:     MD Name: Dr London Appointment Date: 24   MD Name: Appointment Date:   MD Name: Appointment Date:   Other Appointment Notes:     Residual Radiation side effect: Dry mouth

## 2024-02-21 ENCOUNTER — OFFICE VISIT (OUTPATIENT)
Dept: OTOLARYNGOLOGY | Facility: CLINIC | Age: 39
End: 2024-02-21
Payer: OTHER GOVERNMENT

## 2024-02-21 ENCOUNTER — THERAPY VISIT (OUTPATIENT)
Dept: SPEECH THERAPY | Facility: CLINIC | Age: 39
End: 2024-02-21
Payer: OTHER GOVERNMENT

## 2024-02-21 ENCOUNTER — LAB (OUTPATIENT)
Dept: LAB | Facility: CLINIC | Age: 39
End: 2024-02-21
Payer: OTHER GOVERNMENT

## 2024-02-21 DIAGNOSIS — C02.9 PRIMARY SQUAMOUS CELL CARCINOMA OF TONGUE (H): Primary | ICD-10-CM

## 2024-02-21 DIAGNOSIS — C02.9 TONGUE CANCER (H): ICD-10-CM

## 2024-02-21 DIAGNOSIS — C06.9 SQUAMOUS CELL CARCINOMA OF ORAL CAVITY (H): ICD-10-CM

## 2024-02-21 DIAGNOSIS — Z92.3 HISTORY OF THYROID IRRADIATION: ICD-10-CM

## 2024-02-21 DIAGNOSIS — R13.11 ORAL PHASE DYSPHAGIA: ICD-10-CM

## 2024-02-21 LAB — TSH SERPL DL<=0.005 MIU/L-ACNC: 2.6 UIU/ML (ref 0.3–4.2)

## 2024-02-21 PROCEDURE — 99213 OFFICE O/P EST LOW 20 MIN: CPT | Performed by: OTOLARYNGOLOGY

## 2024-02-21 PROCEDURE — 92526 ORAL FUNCTION THERAPY: CPT | Mod: GN | Performed by: SPEECH-LANGUAGE PATHOLOGIST

## 2024-02-21 PROCEDURE — 36415 COLL VENOUS BLD VENIPUNCTURE: CPT | Performed by: PATHOLOGY

## 2024-02-21 PROCEDURE — 84443 ASSAY THYROID STIM HORMONE: CPT | Performed by: PATHOLOGY

## 2024-02-21 NOTE — LETTER
2/21/2024       RE: Kaya Wagner  [address] [facility] [address]     Dear Colleague,    Thank you for referring your patient, Kaya Wagner, to the [facility] EAR NOSE AND THROAT CLINIC [place] at [facility]. Please see a copy of my visit note below.    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  She completed postoperative radiation therapy on August 25, 2023 on June 8, 2023.  She was unable to get her posttreatment PET due to a pregnancy that unfortunately ended in miscarriage.  She ended up undergoing her PET scan in January instead, which was read as negative.      Interval history: As mentioned above, unfortunately she suffered a miscarriage late last year.  With regard to her oral cavity, she has not had any complaints.  She denies otalgia odynophagia dysphagia hoarseness.  She has not noticed any lumps or bumps in her neck.    Physical examination: Examination of the oral cavity shows unchanged appearance of the right mobile tongue with some scarring to the retromolar trigone and right floor of mouth.  Visual inspection and palpation of the prior resection site resection site and palpation as well do not yield any evidence of recurrence.  The remainder of her oral cavity including the tongue, floor mouth, gingiva bucca mucosa, retromolar trigone and posterior pharyngeal wall are normal.  Examination the neck reveals her right neck incision is healed and well.  Her margin lingular nerve function is normal.  There is no lymphadenopathy.    Impression: TANISHA    Plan:  1.  Will see her back in 2 months for oncologic surveillance  2.  She will be due for her 9-month scan in July.        Again, thank you for allowing me to participate in the care of your patient.       Sincerely,    Jabier London MD

## 2024-02-21 NOTE — PROGRESS NOTES
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  She completed postoperative radiation therapy on August 25, 2023 on June 8, 2023.  She was unable to get her posttreatment PET due to a pregnancy that unfortunately ended in miscarriage.  She ended up undergoing her PET scan in January instead, which was read as negative.      Interval history: As mentioned above, unfortunately she suffered a miscarriage late last year.  With regard to her oral cavity, she has not had any complaints.  She denies otalgia odynophagia dysphagia hoarseness.  She has not noticed any lumps or bumps in her neck.    Physical examination: Examination of the oral cavity shows unchanged appearance of the right mobile tongue with some scarring to the retromolar trigone and right floor of mouth.  Visual inspection and palpation of the prior resection site resection site and palpation as well do not yield any evidence of recurrence.  The remainder of her oral cavity including the tongue, floor mouth, gingiva bucca mucosa, retromolar trigone and posterior pharyngeal wall are normal.  Examination the neck reveals her right neck incision is healed and well.  Her margin lingular nerve function is normal.  There is no lymphadenopathy.    Impression: TANISHA    Plan:  1.  Will see her back in 2 months for oncologic surveillance  2.  She will be due for her 9-month scan in July.

## 2024-02-22 ENCOUNTER — TELEPHONE (OUTPATIENT)
Dept: OTOLARYNGOLOGY | Facility: CLINIC | Age: 39
End: 2024-02-22
Payer: OTHER GOVERNMENT

## 2024-02-22 NOTE — TELEPHONE ENCOUNTER
[facility] Call Center    Phone Message    May a detailed message be left on voicemail: yes     Reason for Call: Other: The pt called to schedule a 2 month follow up with Dr. London but no available appointments came up in the search out into next year. Please review and contact the pt for scheduling. Thanks.     Action Taken: Message routed to:  [facility] ENT    Travel Screening: Not Applicable

## 2024-02-22 NOTE — PATIENT INSTRUCTIONS
1. Please follow-up in clinic in 2 months.   2. Please call the ENT clinic with any questions,concerns, new or worsening symptoms.    -Clinic number is [phone]   - Shivani's direct line (Dr. London's nurse) [phone]

## 2024-02-22 NOTE — PROGRESS NOTES
"DISCHARGE  Reason for Discharge: Patient has met all goals.    Equipment Issued: none    Discharge Plan: Patient to continue home program. Patient will let MD know should he experience new or worsening dysphagia, at which point re-evaluation would be warranted.     Referring Provider:  Dr. Jabier London       02/21/24 [id number]   Appointment Info   Treating Provider Christina Morrison MA, CCC-SLP   Visits Used 12   Medical Diagnosis Squamous cell carcinoma of oral cavity   SLP Tx Diagnosis Oral dysphagia   Progress Note/Certification   Onset Of Illness/injury Or Date Of Surgery 06/08/23  (date of surgery)   Therapy Frequency once weekly   Predicted Duration 3 months   Subjective Report   Subjective Report Pt completed adjuvant XRT on 8/25/23. She did not require a PEG, but did lose a significant amount of weight. Today, she returns in conjunction with ENT clinic visit and is seen per MD request unaccompanied. Reports swallowing is \"back to normal.\"   SLP Goals   SLP Goals 1;2   SLP Goal 1   Goal Identifier PO   Goal Description Pt will tolerate least restricitive diet while adhering to safe swallow precautions and without pulmonary compromise across 6 months time.   Rationale To maximize safety, ease and/or independence of oral intake   Goal Progress Pt reports swallowing is \"back to normal\". Reports her taste is returned and she is able to eat everything she wants. She endorses enjoying PO intake. Congratulated her on her hard work. She denies further questions. Trained pt on importance of continuing to monitor her swallow function for any new or worsening symptoms. Pt agreeable.   Target Date 12/20/23   Date Met 02/22/24   SLP Goal 2   Goal Identifier Exercise   Goal Description Pt will improve and/or maintain ROM and strength of BOT, jaw and pharynx by completing 10 repetitions of 5/5 exercises 3 times daily with minimal written or verbal cues.   Rationale To maximize safety, ease and/or independence of oral intake "   Goal Progress Patient reports occasionally completing exercises. Reports the jaw stretch feels helpful, but she also has TMJ. Time spent today retraining rationale/importance of home exercise program given risk of radiation induced fibrosis, which can lead to progressive dysphagia. Reinforced importance of this given how young she is. Pt accepted new handout. Trained patient on recommendation to complete 10 reps of each 2-3x/day. Discussed recommendation to continue for first two years after XRT.   Target Date 12/20/23   Date Met 02/22/24   Treatment Interventions (SLP)   Treatment Interventions Treatment Swallow/Oral dysfunction   Treatment Swallow/Oral dysfunction   Treatment of Swallowing Dysfunction &/or Oral Function for Feeding Minutes (48631) 16 Minutes   Skilled Intervention Provided written and verbal information on diet modifications.;Educated patient on swallowing strategies.;Educated patient on risks of aspiration   Patient Response/Progress Pt demonstrated and verbalized understanding of all material provided.   Education   Learner/Method Patient;No Barriers to Learning   Plan   Plan for next session Discharge from SLP services   Total Session Time   Total Treatment Time (sum of timed and untimed services) 16

## 2024-05-01 ENCOUNTER — OFFICE VISIT (OUTPATIENT)
Dept: OTOLARYNGOLOGY | Facility: CLINIC | Age: 39
End: 2024-05-01
Payer: OTHER GOVERNMENT

## 2024-05-01 VITALS
BODY MASS INDEX: 36.04 KG/M2 | HEIGHT: 64 IN | WEIGHT: 211.1 LBS | OXYGEN SATURATION: 98 % | HEART RATE: 64 BPM | SYSTOLIC BLOOD PRESSURE: 132 MMHG | DIASTOLIC BLOOD PRESSURE: 76 MMHG

## 2024-05-01 DIAGNOSIS — C02.9 PRIMARY SQUAMOUS CELL CARCINOMA OF TONGUE (H): Primary | ICD-10-CM

## 2024-05-01 PROCEDURE — 99213 OFFICE O/P EST LOW 20 MIN: CPT | Performed by: OTOLARYNGOLOGY

## 2024-05-01 ASSESSMENT — PAIN SCALES - GENERAL: PAINLEVEL: MILD PAIN (3)

## 2024-05-01 NOTE — LETTER
5/1/2024       RE: Kaya Wagner  [address] [address] [address]     Dear Colleague,    Thank you for referring your patient, Kaya Wagner, to the [facility] EAR NOSE AND THROAT CLINIC [place] at [facility]. Please see a copy of my visit note below.    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  She completed postoperative radiation therapy on August 25, 2023 on June 8, 2023.  She was unable to get her posttreatment PET due to a pregnancy that unfortunately ended in miscarriage.  She ended up undergoing her PET scan in January instead, which was read as negative.       Interval history: As mentioned above, unfortunately she suffered a miscarriage late last year.  With regard to her oral cavity, she has not had any complaints.  She denies otalgia odynophagia dysphagia hoarseness.  She has not noticed any lumps or bumps in her neck.  She notices some discomfort in her right neck when the baby puts pressure on it.       Physical examination: Examination of the oral cavity shows unchanged appearance of the right mobile tongue with some scarring to the retromolar trigone and right floor of mouth.  Visual inspection and palpation of the prior resection site resection site and palpation as well do not yield any evidence of recurrence.  The remainder of her oral cavity including the tongue, floor mouth, gingiva bucca mucosa, retromolar trigone and posterior pharyngeal wall are normal.  Examination the neck reveals her right neck incision is healed and well.  Her margin lingular nerve function is normal.  There is no lymphadenopathy.     Impression: TANISHA       Plan:   1) She will need her 9 month CT's this summer, I will see her back in 2-3 months.        Again, thank you for allowing me to  participate in the care of your patient.      Sincerely,    Jabier London MD

## 2024-05-01 NOTE — PROGRESS NOTES
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  She completed postoperative radiation therapy on August 25, 2023 on June 8, 2023.  She was unable to get her posttreatment PET due to a pregnancy that unfortunately ended in miscarriage.  She ended up undergoing her PET scan in January instead, which was read as negative.       Interval history: As mentioned above, unfortunately she suffered a miscarriage late last year.  With regard to her oral cavity, she has not had any complaints.  She denies otalgia odynophagia dysphagia hoarseness.  She has not noticed any lumps or bumps in her neck.  She notices some discomfort in her right neck when the baby puts pressure on it.       Physical examination: Examination of the oral cavity shows unchanged appearance of the right mobile tongue with some scarring to the retromolar trigone and right floor of mouth.  Visual inspection and palpation of the prior resection site resection site and palpation as well do not yield any evidence of recurrence.  The remainder of her oral cavity including the tongue, floor mouth, gingiva bucca mucosa, retromolar trigone and posterior pharyngeal wall are normal.  Examination the neck reveals her right neck incision is healed and well.  Her margin lingular nerve function is normal.  There is no lymphadenopathy.     Impression: TANISHA       Plan:   1) She will need her 9 month CT's this summer, I will see her back in 2-3 months.

## 2024-05-01 NOTE — PATIENT INSTRUCTIONS
1. Please follow-up in clinic in July with CT neck and chest the same day.   2. Please call the ENT clinic with any questions,concerns, new or worsening symptoms.    -Clinic number is [phone]   - Shivani's direct line (Dr. London's nurse) [phone]

## 2024-05-02 ENCOUNTER — TELEPHONE (OUTPATIENT)
Dept: OTOLARYNGOLOGY | Facility: CLINIC | Age: 39
End: 2024-05-02
Payer: OTHER GOVERNMENT

## 2024-06-05 ENCOUNTER — ANCILLARY PROCEDURE (OUTPATIENT)
Dept: CT IMAGING | Facility: CLINIC | Age: 39
End: 2024-06-05
Attending: OTOLARYNGOLOGY
Payer: OTHER GOVERNMENT

## 2024-06-05 DIAGNOSIS — C02.9 PRIMARY SQUAMOUS CELL CARCINOMA OF TONGUE (H): ICD-10-CM

## 2024-06-05 PROCEDURE — 70491 CT SOFT TISSUE NECK W/DYE: CPT | Mod: TC | Performed by: RADIOLOGY

## 2024-06-05 PROCEDURE — 71260 CT THORAX DX C+: CPT | Mod: TC | Performed by: RADIOLOGY

## 2024-06-05 RX ORDER — IOPAMIDOL 755 MG/ML
500 INJECTION, SOLUTION INTRAVASCULAR ONCE
Status: COMPLETED | OUTPATIENT
Start: 2024-06-05 | End: 2024-06-05

## 2024-06-05 RX ADMIN — Medication 50 ML: at 16:22

## 2024-06-05 RX ADMIN — IOPAMIDOL 125 ML: 755 INJECTION, SOLUTION INTRAVASCULAR at 16:22

## 2024-07-02 ENCOUNTER — TELEPHONE (OUTPATIENT)
Dept: OTOLARYNGOLOGY | Facility: CLINIC | Age: 39
End: 2024-07-02
Payer: OTHER GOVERNMENT

## 2024-07-24 ENCOUNTER — OFFICE VISIT (OUTPATIENT)
Dept: OTOLARYNGOLOGY | Facility: CLINIC | Age: 39
End: 2024-07-24
Payer: OTHER GOVERNMENT

## 2024-07-24 VITALS
HEART RATE: 65 BPM | BODY MASS INDEX: 37.51 KG/M2 | DIASTOLIC BLOOD PRESSURE: 77 MMHG | OXYGEN SATURATION: 98 % | WEIGHT: 219.7 LBS | SYSTOLIC BLOOD PRESSURE: 129 MMHG | HEIGHT: 64 IN

## 2024-07-24 DIAGNOSIS — C02.9 PRIMARY SQUAMOUS CELL CARCINOMA OF TONGUE (H): Primary | ICD-10-CM

## 2024-07-24 PROCEDURE — 99214 OFFICE O/P EST MOD 30 MIN: CPT | Performed by: OTOLARYNGOLOGY

## 2024-07-24 ASSESSMENT — PAIN SCALES - GENERAL: PAINLEVEL: NO PAIN (0)

## 2024-07-24 NOTE — PROGRESS NOTES
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 She completed postoperative radiation therapy on 2023. She was unable to get her posttreatment PET due to a pregnancy that unfortunately ended in miscarriage. She ended up undergoing her PET scan in , which was read as negative     Interval: some tongue numbness, no dysphagia, cough, hemoptysis, dysphonia, no new lumps or bumps. No fevers, chills, sweating, or weight loss.      Past Medical History:   Diagnosis Date    Squamous cell cancer of tongue (H)      Medications:  No current facility-administered medications for this visit.       Past Surgical History:   Procedure Laterality Date    AS ESOPHAGOSCOPY, DIAGNOSTIC       SECTION      DISSECTION RADICAL NECK MODIFIED Right 2023    Procedure: right modfied radical neck dissection;  Surgeon: Jabier London MD;  Location: [facility]    GLOSSECTOMY PARTIAL Right 2023    Procedure: Right partial glossectomy, nasogastric tube placement;  Surgeon: Jabier London MD;  Location: [facility]          Allergies   Allergen Reactions    Dust Mites     Gluten Meal Diarrhea, Nausea, Nausea and Vomiting and Other (See Comments)     Vomiting and diarrhea      Maple Tree Unknown    Pollen Extract        No family history on file.    Social History     Tobacco Use    Smoking status: Never    Smokeless tobacco: Never   Substance Use Topics    Alcohol use: Not Currently         ROS: A pertinent ROS was negative other than mentioned in HPI     PE:  /77   Pulse 65   Ht 1.626 m (5' 4\")   Wt 99.7 kg (219 lb 11.2 oz)   SpO2 98%   BMI 37.71 kg/m    Gen: AAOx3  EENT:   Eyes:EOMI, PERRL   Ears: b/l EACs and TMs atraumatic and without lesions   Nose: no significant nasal drainage, septum midline, nasal mucosa healthy " appearing   Throat: oral cavity moist, oropharynx without lesions, healthy appearing mucosa  Neck: No significant masses or nodes appreciated. Right sided scar healing very well.   Pulm: non-labored breathing  Skin: no rashes/lesions on visualized skin   Neuro: CN II-XII grossly intact    Imaging/Labs:    CT SCAN OF THE NECK WITH CONTRAST 6/5/202     IMPRESSION:    1. No suspicious findings in the neck to suggest recurrent neoplasm.  2. Treatment changes in the neck, overall grossly similar to prior CT  1/10/2024.    CT CHEST WITH CONTRAST 6/5/2024     IMPRESSION: No recurrent or residual malignancy demonstrated.     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 She completed postoperative radiation therapy on August 25, 2023.      - follow up visit in four months  - next CT in one year, unless no concerns arise    Pamela Wood, MS4    I, Jabier London MD, was present with the medical student who participated in the service and in the documentation of the note.  I have verified the history and personally performed the physical exam and medical decision making.  I agree with the assessment and plan of care as documented in the note.

## 2024-07-24 NOTE — LETTER
2024       RE: Kaya Wagner  [address] [facility] [address]     Dear Colleague,    Thank you for referring your patient, Kaya Wagner, to the [facility] EAR NOSE AND THROAT CLINIC [place] at [facility]. Please see a copy of my visit note below.    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 She completed postoperative radiation therapy on 2023. She was unable to get her posttreatment PET due to a pregnancy that unfortunately ended in miscarriage. She ended up undergoing her PET scan in , which was read as negative     Interval: some tongue numbness, no dysphagia, cough, hemoptysis, dysphonia, no new lumps or bumps. No fevers, chills, sweating, or weight loss.      Past Medical History:   Diagnosis Date     Squamous cell cancer of tongue (H)      Medications:  No current facility-administered medications for this visit.       Past Surgical History:   Procedure Laterality Date     AS ESOPHAGOSCOPY, DIAGNOSTIC        SECTION       DISSECTION RADICAL NECK MODIFIED Right 2023    Procedure: right modfied radical neck dissection;  Surgeon: Jabier London MD;  Location: [facility]     GLOSSECTOMY PARTIAL Right 2023    Procedure: Right partial glossectomy, nasogastric tube placement;  Surgeon: Jabier London MD;  Location: [facility]          Allergies   Allergen Reactions     Dust Mites      Gluten Meal Diarrhea, Nausea, Nausea and Vomiting and Other (See Comments)     Vomiting and diarrhea       Maple Tree Unknown     Pollen Extract        No family history on file.    Social History     Tobacco Use     Smoking status: Never     Smokeless tobacco: Never   Substance Use Topics     Alcohol use: Not Currently         ROS: A pertinent ROS was  "negative other than mentioned in HPI     PE:  /77   Pulse 65   Ht 1.626 m (5' 4\")   Wt 99.7 kg (219 lb 11.2 oz)   SpO2 98%   BMI 37.71 kg/m    Gen: AAOx3  EENT:   Eyes:EOMI, PERRL   Ears: b/l EACs and TMs atraumatic and without lesions   Nose: no significant nasal drainage, septum midline, nasal mucosa healthy appearing   Throat: oral cavity moist, oropharynx without lesions, healthy appearing mucosa  Neck: No significant masses or nodes appreciated. Right sided scar healing very well.   Pulm: non-labored breathing  Skin: no rashes/lesions on visualized skin   Neuro: CN II-XII grossly intact    Imaging/Labs:    CT SCAN OF THE NECK WITH CONTRAST 6/5/202     IMPRESSION:    1. No suspicious findings in the neck to suggest recurrent neoplasm.  2. Treatment changes in the neck, overall grossly similar to prior CT  1/10/2024.    CT CHEST WITH CONTRAST 6/5/2024     IMPRESSION: No recurrent or residual malignancy demonstrated.     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 She completed postoperative radiation therapy on August 25, 2023.      - follow up visit in four months  - next CT in one year, unless no concerns arise    Pamela Wood, MS4    I, Jabier London MD, was present with the medical student who participated in the service and in the documentation of the note.  I have verified the history and personally performed the physical exam and medical decision making.  I agree with the assessment and plan of care as documented in the note.        Again, thank you for allowing me to participate in the care of your patient.      Sincerely,    Jabier London MD    "

## 2024-07-28 ENCOUNTER — HEALTH MAINTENANCE LETTER (OUTPATIENT)
Age: 39
End: 2024-07-28

## 2024-08-05 NOTE — PROGRESS NOTES
DISCHARGE  Reason for Discharge: Patient has met all goals.    Equipment Issued: Compression recommendations, pump referral (Tactile medical shipped to pt), HEP    Discharge Plan: Patient to continue home program.    Referring Provider:  Mari Dias     01/10/24 0500   Appointment Info   Signing clinician's name / credentials Precious Catherine PT, CLT-FLORA   Visits Used 6   Medical Diagnosis Acquired lymphedema, Squamous cell carcinoma of oral cavity   PT Tx Diagnosis Lymphedema   Progress Note/Certification   Onset of illness/injury or Date of Surgery 08/25/23   Therapy Frequency 1x a week   Predicted Duration 90 days   GOALS   PT Goals 2;3   PT Goal 1   Goal Identifier Compression   Goal Description Pt will obtain and utilize appropriate compression to head/ neck to aid in edema reduction and management   Target Date 01/15/24   Date Met 12/20/23   PT Goal 2   Goal Identifier HEP   Goal Description Pt will demonstrate understanding and completion of targeted HEP of compression, self massage  and skin care to improve edema, reduce risk of infection and decrease cosmetic impact of facial edema   Goal Progress Pt is indepdnent in HEP of self massage, skin care and use of compression. Pump trial complete, awaiting insurance authorization   Target Date 01/15/24   Date Met 01/10/24   PT Goal 3   Goal Identifier Volume/ Measurements   Goal Description Pt will maintain or reduce circumfrential measurements to demonstrate reduced submandibular edema   Goal Progress Good reductio noted to neck circumfrence.   Target Date 01/15/24   Date Met 01/10/24   Subjective Report   Subjective Report Continues to use compression and do self massage. Does not feel she has had much change feels stable. Is currently on her cycle so feeling a bit more swollen everywhere   Manual Therapy   Manual Therapy: Mobilization, MFR, MLD, friction massage minutes ([id number]) 40   Manual Therapy 1 MLD   Manual Therapy 1 - Details Pt arriving for  check in having been self managing edema x2 weeks. She remains consistent with HEP with compression and self massage. Pt positioned in seated with CLT completing MLD clearance of head and neck with LN stim of BSC, BAX subocciptial, preauricular and submandibular nodes with fluid mobilized superior to incision line over ears to back of head to posterior inter axillary pathways. Areas inferior to incision line, fluid mobilized to right and left SC then AX nodes. All questions answered with plan to maintain case open x60 days then d/c if no further needs. Pt is still awaiting lymphedema pump authorization with pt to follow up in clinic after recieving pump if needed.   Plan   Home program compression 2x daily, skin care, self massage   Plan for next session session after pump arrives, assess needs/ d/c   Total Session Time   Timed Code Treatment Minutes 40   Total Treatment Time (sum of timed and untimed services) 40

## 2024-08-21 ENCOUNTER — OFFICE VISIT (OUTPATIENT)
Dept: RADIATION ONCOLOGY | Facility: CLINIC | Age: 39
End: 2024-08-21
Attending: RADIOLOGY
Payer: OTHER GOVERNMENT

## 2024-08-21 VITALS
WEIGHT: 221.5 LBS | HEART RATE: 64 BPM | BODY MASS INDEX: 38.02 KG/M2 | TEMPERATURE: 98.2 F | SYSTOLIC BLOOD PRESSURE: 119 MMHG | DIASTOLIC BLOOD PRESSURE: 75 MMHG | OXYGEN SATURATION: 98 %

## 2024-08-21 DIAGNOSIS — C06.9 SQUAMOUS CELL CARCINOMA OF ORAL CAVITY (H): Primary | ICD-10-CM

## 2024-08-21 PROCEDURE — 99215 OFFICE O/P EST HI 40 MIN: CPT | Performed by: RADIOLOGY

## 2024-08-21 PROCEDURE — 99214 OFFICE O/P EST MOD 30 MIN: CPT | Performed by: RADIOLOGY

## 2024-08-21 ASSESSMENT — PAIN SCALES - GENERAL: PAINLEVEL: NO PAIN (0)

## 2024-08-21 NOTE — PROGRESS NOTES
"Oncology Rooming Note    August 21, 2024 2:51 PM   Kaya Wagner is a 38 year old female who presents for:    Chief Complaint   Patient presents with    Oncology Clinic Visit     New Radiation Therapy Consult     Initial Vitals: /75 (BP Location: Right arm, Patient Position: Sitting, Cuff Size: Adult Large)   Pulse 64   Temp 98.2  F (36.8  C) (Oral)   Wt 100.5 kg (221 lb 8 oz)   SpO2 98%   BMI 38.02 kg/m   Estimated body mass index is 38.02 kg/m  as calculated from the following:    Height as of 7/24/24: 1.626 m (5' 4\").    Weight as of this encounter: 100.5 kg (221 lb 8 oz). Body surface area is 2.13 meters squared.  No Pain (0) Comment: Data Unavailable   No LMP recorded.  Allergies reviewed: Yes  Medications reviewed: Yes    Medications: Medication refills not needed today.  Pharmacy name entered into [facility]:    [id number] - [place], [place] - [address] [facility] AT [address] [place], [place] - [address] [facility] [place]    Frailty Screening:   Is the patient here for a new oncology consult visit in cancer care? 2. No      Clinical concerns: Swollen lymph node R armpit, slightly tender. Some tongue numbness at times. Had cellulitis in August. Went to [place] and used 50 SPF sunscreen then developed red itchy, no bumps, since resolved. Dry mouth, worse at night, uses lubricant.  Dr Dias was notified.      Tasha Urbano RN            "

## 2024-08-21 NOTE — LETTER
"2024      Kaya Wagner  [address] [facility] [address]      Dear Colleague,    Thank you for referring your patient, Kaya Wagner, to the [facility] RADIATION ONCOLOGY. Please see a copy of my visit note below.    Oncology Rooming Note    2024 2:51 PM   Kaya Wagner is a 38 year old female who presents for:    Chief Complaint   Patient presents with     Oncology Clinic Visit     New Radiation Therapy Consult     Initial Vitals: /75 (BP Location: Right arm, Patient Position: Sitting, Cuff Size: Adult Large)   Pulse 64   Temp 98.2  F (36.8  C) (Oral)   Wt 100.5 kg (221 lb 8 oz)   SpO2 98%   BMI 38.02 kg/m   Estimated body mass index is 38.02 kg/m  as calculated from the following:    Height as of 24: 1.626 m (5' 4\").    Weight as of this encounter: 100.5 kg (221 lb 8 oz). Body surface area is 2.13 meters squared.  No Pain (0) Comment: Data Unavailable   No LMP recorded.  Allergies reviewed: Yes  Medications reviewed: Yes    Medications: Medication refills not needed today.  Pharmacy name entered into [employer]:    [id number] - [place], [place] - [address] [facility] AT [address] [place] [place] - [address] [facility]    Frailty Screening:   Is the patient here for a new oncology consult visit in cancer care? 2. No      Clinical concerns: Swollen lymph node R armpit, slightly tender. Some tongue numbness at times. Had cellulitis in August. Went to [place] and used 50 SPF sunscreen then developed red itchy, no bumps, since resolved. Dry mouth, worse at night, uses lubricant.  Dr Dias was notified.      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  Since her last visit she has continued to improve.  She is back to eating regular diet.  Her taste sensation has mostly returned.  She is not having any pain.  She continues to use her fluoride trays as directed.  She is most bothered by xerostomia which she is managing with increased fluid intake and eating foods with more sauces.  She is interested in medication to stimulate salivary flow. She continues to do lymphedema therapy intermittently.    She had a scheduled CT scan of the neck on 2024 which showed no evidence of recurrent neoplasm with treatment changes in the neck without change from imaging on 1/10/2024.    She was seen in urgent care on 2024 for evaluation of bodyaches, fatigue, chills and erythema of her chin and neck.  She was given prescription for Augmentin and Flagyl.  Repeat CT scan of the neck on 2024 at [facility] showed cellulitis but no other concerning lesions.  The erythema has since resolved and she has had no further fevers.    PAST MEDICAL/SURGICAL HISTORY:   Past Medical History:   Diagnosis Date     Squamous cell cancer of tongue (H)       Past Surgical History:   Procedure Laterality Date     AS ESOPHAGOSCOPY, DIAGNOSTIC        SECTION       DILATION AND CURETTAGE      2023     DISSECTION RADICAL NECK MODIFIED Right 2023    Procedure: right modfied radical neck dissection;  Surgeon: Jabier London MD;  Location: [facility]     GLOSSECTOMY PARTIAL Right 2023    Procedure: Right partial glossectomy, nasogastric tube placement;  Surgeon: Jabier London MD;  Location: [facility]     ALLERGIES:    Allergies   Allergen  Reactions     Dust Mites      Gluten Meal Diarrhea, Nausea, Nausea and Vomiting and Other (See Comments)     Vomiting and diarrhea       Maple Tree Unknown     Pollen Extract      MEDICATIONS:   Current Outpatient Medications   Medication Sig Dispense Refill     cevimeline (EVOXAC) 30 MG capsule Take 1 capsule (30 mg) by mouth 3 times daily 90 capsule 11     REVIEW OF SYSTEMS: A 12-point review of systems was obtained. Pertinent findings are noted in the HPI and are otherwise unremarkable.     PHYSICAL EXAM:  VITALS: /75 (BP Location: Right arm, Patient Position: Sitting, Cuff Size: Adult Large)   Pulse 64   Temp 98.2  F (36.8  C) (Oral)   Wt 100.5 kg (221 lb 8 oz)   SpO2 98%   BMI 38.02 kg/m    GEN: Alert, oriented, in no acute distress  HEENT: Normcephalic, oral cavity and oropharynx without mucositis or thrush, tacky mucous membranes, no discrete ulcerations visualized, no palpable mass of the oral tongue, oral tongue protrudes well past incisors               .  Neck: No palpable adenopathy, mild submental lymphedema  CV: Regular rate, extremities warm, well-perfused  RESP: Breathing comfortably on room air, no wheezing  ABDOMEN: Nondistended  SKIN: Resolution of radiation erythema, mild residual hyperpigmentation, no desquamation, no acute erythema  MSK: No muscle bulk and tone  LYMPHATICS: No palpable cervical or supraclavicular adenopathy  NEURO: CN II-XII grossly intact, no focal neurologic deficit  PSYCH: Appropriate affect    LABS AND IMAGING: Please see history of present illness discussion of recent CT scans of the neck.    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  She has recovered well from the acute effects of radiation. She will continue for fluoride prophylaxis and to continue swallowing and stretching exercises.  I discussed the swallowing  exercises and stretching exercises need to continue over the course of her lifetime.  We also discussed that continuing lymphedema therapy on a regular basis for the next year or 2 will help her not have long-term tissue induration in the submental area of her neck.    I am uncertain what caused her skin erythema for which she required antibiotics earlier this month.  I did reinforce that being seen urgently was appropriate given her symptoms and erythema.    She plans to see Dr. London in November 2024.    Continue Evoxac for xerostomia-30 mg 3 times daily.    Reinforced sun prophylaxis and that she should wear sunblock 365 days a year.    We appreciate the opportunity to participate in Kaya Wagner's care.  Please call with questions.    45 minutes spent by me on the date of the encounter doing chart review, history and exam, documentation and further activities per the note.    Mari Dias MD  Radiation Oncology      CC:  Patient Care Team:  Storm Fitzpatrick MD as PCP - General (Family Medicine)  Jabier London MD as Assigned Surgical Provider  Mari Dias MD as MD (Radiation Oncology)  Jabier London MD as MD (Otolaryngology)  Mari Dias MD as Assigned Cancer Care Provider     This note was dictated with voice recognition software and then edited. Please excuse any unintentional errors.       Again, thank you for allowing me to participate in the care of your patient.        Sincerely,        Mari Dias MD

## 2024-09-22 NOTE — PROGRESS NOTES
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  Since her last visit she has continued to improve.  She is back to eating regular diet.  Her taste sensation has mostly returned.  She is not having any pain.  She continues to use her fluoride trays as directed.  She is most bothered by xerostomia which she is managing with increased fluid intake and eating foods with more sauces.  She is interested in medication to stimulate salivary flow. She continues to do lymphedema therapy intermittently.    She had a scheduled CT scan of the neck on 2024 which showed no evidence of recurrent neoplasm with treatment changes in the neck without change from imaging on 1/10/2024.    She was seen in urgent care on 2024 for evaluation of bodyaches, fatigue, chills and erythema of her chin and neck.  She was given prescription for Augmentin and Flagyl.  Repeat CT scan of the neck on 2024 at [facility] showed cellulitis but no other concerning lesions.  The erythema has since resolved and she has had no further fevers.    PAST MEDICAL/SURGICAL HISTORY:   Past Medical History:   Diagnosis Date    Squamous cell cancer of tongue (H)       Past Surgical History:   Procedure Laterality Date    AS ESOPHAGOSCOPY, DIAGNOSTIC       SECTION      DILATION AND CURETTAGE      2023    DISSECTION RADICAL NECK MODIFIED Right 2023    Procedure: right  modfied radical neck dissection;  Surgeon: Jabier London MD;  Location: [facility]    GLOSSECTOMY PARTIAL Right 06/08/2023    Procedure: Right partial glossectomy, nasogastric tube placement;  Surgeon: Jabier London MD;  Location: [facility]     ALLERGIES:    Allergies   Allergen Reactions    Dust Mites     Gluten Meal Diarrhea, Nausea, Nausea and Vomiting and Other (See Comments)     Vomiting and diarrhea      Maple Tree Unknown    Pollen Extract      MEDICATIONS:   Current Outpatient Medications   Medication Sig Dispense Refill    cevimeline (EVOXAC) 30 MG capsule Take 1 capsule (30 mg) by mouth 3 times daily 90 capsule 11     REVIEW OF SYSTEMS: A 12-point review of systems was obtained. Pertinent findings are noted in the HPI and are otherwise unremarkable.     PHYSICAL EXAM:  VITALS: /75 (BP Location: Right arm, Patient Position: Sitting, Cuff Size: Adult Large)   Pulse 64   Temp 98.2  F (36.8  C) (Oral)   Wt 100.5 kg (221 lb 8 oz)   SpO2 98%   BMI 38.02 kg/m    GEN: Alert, oriented, in no acute distress  HEENT: Normcephalic, oral cavity and oropharynx without mucositis or thrush, tacky mucous membranes, no discrete ulcerations visualized, no palpable mass of the oral tongue, oral tongue protrudes well past incisors               .  Neck: No palpable adenopathy, mild submental lymphedema  CV: Regular rate, extremities warm, well-perfused  RESP: Breathing comfortably on room air, no wheezing  ABDOMEN: Nondistended  SKIN: Resolution of radiation erythema, mild residual hyperpigmentation, no desquamation, no acute erythema  MSK: No muscle bulk and tone  LYMPHATICS: No palpable cervical or supraclavicular adenopathy  NEURO: CN II-XII grossly intact, no focal neurologic deficit  PSYCH: Appropriate affect    LABS AND IMAGING: Please see history of present illness discussion of recent CT scans of the neck.    IMPRESSION/RECOMMENDATION:  Kaya Wagner is a 38-year-old woman with pT3 pN0 cM0 squamous cell  carcinoma of the right lateral oral tongue status post adjuvant radiation as described above.  She has recovered well from the acute effects of radiation. She will continue for fluoride prophylaxis and to continue swallowing and stretching exercises.  I discussed the swallowing exercises and stretching exercises need to continue over the course of her lifetime.  We also discussed that continuing lymphedema therapy on a regular basis for the next year or 2 will help her not have long-term tissue induration in the submental area of her neck.    I am uncertain what caused her skin erythema for which she required antibiotics earlier this month.  I did reinforce that being seen urgently was appropriate given her symptoms and erythema.    She plans to see Dr. London in November 2024.    Continue Evoxac for xerostomia-30 mg 3 times daily.    Reinforced sun prophylaxis and that she should wear sunblock 365 days a year.    We appreciate the opportunity to participate in Kaya Wagner's care.  Please call with questions.    45 minutes spent by me on the date of the encounter doing chart review, history and exam, documentation and further activities per the note.    Mari Dias MD  Radiation Oncology      CC:  Patient Care Team:  Storm Fitzpatrick MD as PCP - General (Family Medicine)  Jabier Lodnon MD as Assigned Surgical Provider  Mari Dias MD as MD (Radiation Oncology)  Jabier London MD as MD (Otolaryngology)  Mari Dias MD as Assigned Cancer Care Provider     This note was dictated with voice recognition software and then edited. Please excuse any unintentional errors.

## 2024-10-06 ENCOUNTER — HEALTH MAINTENANCE LETTER (OUTPATIENT)
Age: 39
End: 2024-10-06

## 2024-10-08 ENCOUNTER — MYC MEDICAL ADVICE (OUTPATIENT)
Dept: OTOLARYNGOLOGY | Facility: CLINIC | Age: 39
End: 2024-10-08
Payer: OTHER GOVERNMENT

## 2024-10-08 NOTE — TELEPHONE ENCOUNTER
This patient needs to reschedule their appt with Dr. London on Wednesday, 11/27 to another day near by.

## 2024-11-25 ENCOUNTER — ANCILLARY PROCEDURE (OUTPATIENT)
Dept: MAMMOGRAPHY | Facility: CLINIC | Age: 39
End: 2024-11-25
Attending: FAMILY MEDICINE
Payer: OTHER GOVERNMENT

## 2024-11-25 DIAGNOSIS — Z12.31 VISIT FOR SCREENING MAMMOGRAM: ICD-10-CM

## 2024-11-25 PROCEDURE — 77067 SCR MAMMO BI INCL CAD: CPT | Mod: TC | Performed by: RADIOLOGY

## 2024-11-25 PROCEDURE — 77063 BREAST TOMOSYNTHESIS BI: CPT | Mod: TC | Performed by: RADIOLOGY

## 2024-12-04 ENCOUNTER — OFFICE VISIT (OUTPATIENT)
Dept: OTOLARYNGOLOGY | Facility: CLINIC | Age: 39
End: 2024-12-04
Payer: OTHER GOVERNMENT

## 2024-12-04 VITALS
OXYGEN SATURATION: 99 % | HEART RATE: 60 BPM | WEIGHT: 239 LBS | TEMPERATURE: 98 F | HEIGHT: 64 IN | SYSTOLIC BLOOD PRESSURE: 136 MMHG | BODY MASS INDEX: 40.8 KG/M2 | DIASTOLIC BLOOD PRESSURE: 78 MMHG

## 2024-12-04 DIAGNOSIS — C02.9 TONGUE CANCER (H): Primary | ICD-10-CM

## 2024-12-04 PROCEDURE — 99213 OFFICE O/P EST LOW 20 MIN: CPT | Mod: GC | Performed by: OTOLARYNGOLOGY

## 2024-12-04 ASSESSMENT — PAIN SCALES - GENERAL: PAINLEVEL_OUTOF10: NO PAIN (0)

## 2024-12-04 NOTE — NURSING NOTE
"Chief Complaint   Patient presents with    RECHECK     4 month follow up      .Blood pressure 136/78, pulse 60, temperature 98  F (36.7  C), height 1.626 m (5' 4\"), weight 108.4 kg (239 lb), SpO2 99%, currently breastfeeding.    Jonathan Moses LPN    "

## 2024-12-04 NOTE — PROGRESS NOTES
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 She completed postoperative radiation therapy on 2023. She was unable to get her posttreatment PET due to a pregnancy that unfortunately ended in miscarriage. She ended up undergoing her PET scan in , which was read as negative     Interval: Kaya returns to clinic today and is doing very well.  Some tongue numbness, no dysphagia, cough, hemoptysis, dysphonia, no new lumps or bumps. No fevers, chills, sweating, or weight loss.      Past Medical History:   Diagnosis Date    Squamous cell cancer of tongue (H)      Medications:  No current facility-administered medications for this visit.       Past Surgical History:   Procedure Laterality Date    AS ESOPHAGOSCOPY, DIAGNOSTIC       SECTION      DILATION AND CURETTAGE      2023    DISSECTION RADICAL NECK MODIFIED Right 2023    Procedure: right modfied radical neck dissection;  Surgeon: Jabier London MD;  Location: [facility]    GLOSSECTOMY PARTIAL Right 2023    Procedure: Right partial glossectomy, nasogastric tube placement;  Surgeon: Jabier London MD;  Location: [facility] OR          Allergies   Allergen Reactions    Dust Mites     Gluten Meal Diarrhea, Nausea, Nausea and Vomiting and Other (See Comments)     Vomiting and diarrhea      Maple Tree Unknown    Pollen Extract        No family history on file.    Social History     Tobacco Use    Smoking status: Never    Smokeless tobacco: Never   Substance Use Topics    Alcohol use: Not Currently         ROS: A pertinent ROS was negative other than mentioned in HPI     PE:  /78   Pulse 60   Temp 98  F (36.7  C)   Ht 1.626 m (5' 4\")   Wt 108.4 kg (239 lb)   SpO2 99%   BMI 41.02 kg/m    Gen: AAOx3  EENT:   Eyes:EOMI, PERRL   Ears: b/l EACs and TMs " atraumatic and without lesions   Nose: no significant nasal drainage, septum midline, nasal mucosa healthy appearing  Mouth: Well-healed right partial glossectomy defect, soft to palpation no concerning areas   Throat: oral cavity moist, oropharynx without lesions, healthy appearing mucosa  Neck: No significant masses or nodes appreciated. Right sided scar healing very well.   Pulm: non-labored breathing  Skin: no rashes/lesions on visualized skin   Neuro: CN II-XII grossly intact    Imaging/Labs:    CT SCAN OF THE NECK WITH CONTRAST 6/5/202     IMPRESSION:    1. No suspicious findings in the neck to suggest recurrent neoplasm.  2. Treatment changes in the neck, overall grossly similar to prior CT  1/10/2024.    CT CHEST WITH CONTRAST 6/5/2024     IMPRESSION: No recurrent or residual malignancy demonstrated.     A/P: No evidence of disease on exam today.  Functionally excellent.      - follow up visit in four months  - next CT in April/May    Patient seen with Dr. London.

## 2024-12-04 NOTE — LETTER
2024       RE: Kaya Wagner  [id number] [facility] [address]     Dear Colleague,    Thank you for referring your patient, Kaya Wagner, to the [facility] [place] at [facility]. Please see a copy of my visit note below.    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 She completed postoperative radiation therapy on 2023. She was unable to get her posttreatment PET due to a pregnancy that unfortunately ended in miscarriage. She ended up undergoing her PET scan in , which was read as negative     Interval: Kaya returns to clinic today and is doing very well.  Some tongue numbness, no dysphagia, cough, hemoptysis, dysphonia, no new lumps or bumps. No fevers, chills, sweating, or weight loss.      Past Medical History:   Diagnosis Date     Squamous cell cancer of tongue (H)      Medications:  No current facility-administered medications for this visit.       Past Surgical History:   Procedure Laterality Date     AS ESOPHAGOSCOPY, DIAGNOSTIC        SECTION       DILATION AND CURETTAGE      2023     DISSECTION RADICAL NECK MODIFIED Right 2023    Procedure: right modfied radical neck dissection;  Surgeon: Jabier London MD;  Location: [facility]     GLOSSECTOMY PARTIAL Right 2023    Procedure: Right partial glossectomy, nasogastric tube placement;  Surgeon: Jabier London MD;  Location: [facility]          Allergies   Allergen Reactions     Dust Mites      Gluten Meal Diarrhea, Nausea, Nausea and Vomiting and Other (See Comments)     Vomiting and diarrhea       Maple Tree Unknown     Pollen Extract        No family history on file.    Social History     Tobacco Use     Smoking status: Never     Smokeless tobacco:  "Never   Substance Use Topics     Alcohol use: Not Currently         ROS: A pertinent ROS was negative other than mentioned in HPI     PE:  /78   Pulse 60   Temp 98  F (36.7  C)   Ht 1.626 m (5' 4\")   Wt 108.4 kg (239 lb)   SpO2 99%   BMI 41.02 kg/m    Gen: AAOx3  EENT:   Eyes:EOMI, PERRL   Ears: b/l EACs and TMs atraumatic and without lesions   Nose: no significant nasal drainage, septum midline, nasal mucosa healthy appearing  Mouth: Well-healed right partial glossectomy defect, soft to palpation no concerning areas   Throat: oral cavity moist, oropharynx without lesions, healthy appearing mucosa  Neck: No significant masses or nodes appreciated. Right sided scar healing very well.   Pulm: non-labored breathing  Skin: no rashes/lesions on visualized skin   Neuro: CN II-XII grossly intact    Imaging/Labs:    CT SCAN OF THE NECK WITH CONTRAST 6/5/202     IMPRESSION:    1. No suspicious findings in the neck to suggest recurrent neoplasm.  2. Treatment changes in the neck, overall grossly similar to prior CT  1/10/2024.    CT CHEST WITH CONTRAST 6/5/2024     IMPRESSION: No recurrent or residual malignancy demonstrated.     A/P: No evidence of disease on exam today.  Functionally excellent.      - follow up visit in four months  - next CT in April/May    Patient seen with Dr. London.    Attestation signed by Jabier London MD at 12/8/2024  6:53 PM:  I, Jabier London MD, saw this patient with the resident/fellow and agree with the resident's findings and plan of care as documented in the resident's/fellow's note.      Again, thank you for allowing me to participate in the care of your patient.      Sincerely,    Jabier London MD    "

## 2025-01-14 ENCOUNTER — TELEPHONE (OUTPATIENT)
Dept: OTOLARYNGOLOGY | Facility: CLINIC | Age: 40
End: 2025-01-14
Payer: COMMERCIAL

## 2025-01-14 NOTE — TELEPHONE ENCOUNTER
Sent Mychart (1st Attempt) and Left Voicemail (2nd Attempt) for the patient to call back and schedule the following:    Appointment Type:  CTs  Provider: Dr. Jabier London   Appt date: 1/24/25 @3:20PM  Need to reschedule  Specialty phone number: [phone]  Additional appointment(s) needed: YES  Additional Notes: F/U appt is 4/16/25, need to reschedule CT to same day prior to appt

## 2025-02-27 ENCOUNTER — MYC REFILL (OUTPATIENT)
Dept: RADIATION ONCOLOGY | Facility: CLINIC | Age: 40
End: 2025-02-27
Payer: COMMERCIAL

## 2025-02-27 DIAGNOSIS — C06.9 SQUAMOUS CELL CARCINOMA OF ORAL CAVITY (H): ICD-10-CM

## 2025-02-27 DIAGNOSIS — K11.7 XEROSTOMIA DUE TO RADIOTHERAPY: ICD-10-CM
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
## 2025-03-04 RX ORDER — CEVIMELINE HYDROCHLORIDE 30 MG/1
30 CAPSULE ORAL 3 TIMES DAILY
Qty: 90 CAPSULE | Refills: 11 | Status: SHIPPED | OUTPATIENT
Start: 2025-03-04

## 2025-04-17 ENCOUNTER — MYC MEDICAL ADVICE (OUTPATIENT)
Dept: OTOLARYNGOLOGY | Facility: CLINIC | Age: 40
End: 2025-04-17
Payer: COMMERCIAL

## 2025-04-17 NOTE — TELEPHONE ENCOUNTER
Left Voicemail (1st Attempt) for the patient to call back and schedule the following:    Appointment type: Return ENT  Provider: Dr. jimenez  Return date: August  Specialty phone number: [phone]  Additional appointment(s) needed: No  Additonal Notes: 4 month follow up

## 2025-04-23 ENCOUNTER — ANCILLARY PROCEDURE (OUTPATIENT)
Dept: CT IMAGING | Facility: CLINIC | Age: 40
End: 2025-04-23
Attending: OTOLARYNGOLOGY
Payer: COMMERCIAL

## 2025-04-23 DIAGNOSIS — C02.9 SQUAMOUS CELL CANCER OF TONGUE (H): ICD-10-CM

## 2025-04-23 PROCEDURE — 70491 CT SOFT TISSUE NECK W/DYE: CPT | Mod: TC | Performed by: RADIOLOGY

## 2025-04-23 PROCEDURE — 71260 CT THORAX DX C+: CPT | Mod: TC | Performed by: INTERNAL MEDICINE

## 2025-04-23 RX ORDER — IOPAMIDOL 755 MG/ML
125 INJECTION, SOLUTION INTRAVASCULAR ONCE
Status: COMPLETED | OUTPATIENT
Start: 2025-04-23 | End: 2025-04-23

## 2025-04-23 RX ADMIN — IOPAMIDOL 125 ML: 755 INJECTION, SOLUTION INTRAVASCULAR at 14:47

## 2025-08-10 ENCOUNTER — HEALTH MAINTENANCE LETTER (OUTPATIENT)
Age: 40
End: 2025-08-10

## 2025-08-11 ENCOUNTER — TELEPHONE (OUTPATIENT)
Dept: OTOLARYNGOLOGY | Facility: CLINIC | Age: 40
End: 2025-08-11
Payer: COMMERCIAL

## (undated) DEVICE — CLIP HORIZON MED BLUE 002200

## (undated) DEVICE — SU ETHILON 3-0 PS-1 18" 1663H

## (undated) DEVICE — LINEN TOWEL PACK X6 WHITE 5487

## (undated) DEVICE — SU VICRYL 3-0 SH 8X18" UND J864D

## (undated) DEVICE — SPONGE LAP 18X18" X8435

## (undated) DEVICE — DRSG XEROFORM 5X9" CUR253590W

## (undated) DEVICE — SPONGE RAY-TEC 4X8" 7318

## (undated) DEVICE — DRAIN JACKSON PRATT RESERVOIR 100ML SU130-1305

## (undated) DEVICE — SU SILK 3-0 TIE 12X30" A304H

## (undated) DEVICE — BLADE KNIFE SURG 15 371115

## (undated) DEVICE — SU SILK 3-0 X-1 18" 632G

## (undated) DEVICE — BLADE KNIFE SURG 10 371110

## (undated) DEVICE — Device

## (undated) DEVICE — RETR ELASTIC STAYS LONE STAR BLUNT DUAL LEAD 3550-1G

## (undated) DEVICE — LINEN TOWEL PACK X30 5481

## (undated) DEVICE — ESU ELEC BLADE 2.75" COATED/INSULATED E1455

## (undated) DEVICE — CLIP HORIZON SM RED WIDE SLOT 001201

## (undated) DEVICE — SU SILK 2-0 TIE 12X30" A305H

## (undated) DEVICE — SU SILK 2-0 SH CR 5X18" C0125

## (undated) DEVICE — PREP SKIN SCRUB TRAY 4461A

## (undated) DEVICE — SPONGE KITTNER 30-101

## (undated) DEVICE — PREP POVIDONE-IODINE 7.5% SCRUB 4OZ BOTTLE MDS093945

## (undated) DEVICE — STPL SKIN PROXIMATE 35 WIDE PMW35

## (undated) DEVICE — STRAP UNIVERSAL POSITIONING 2-PIECE 4X47X76" 91-287

## (undated) DEVICE — CATH TRAY FOLEY SURESTEP 16FR W/TMP PRB STLK LATEX A319416AM

## (undated) DEVICE — CUP AND LID 2PK 2OZ STERILE  SSK9006A

## (undated) DEVICE — TUBE NASOGASTRIC FEEDING ENTRIFLEX ENFIT 12FR 43 8884721252E

## (undated) DEVICE — NDL COUNTER 20CT 31142493

## (undated) DEVICE — ESU ELEC NDL 1" COATED/INSULATED E1465

## (undated) DEVICE — GLOVE BIOGEL PI MICRO SZ 7.5 48575

## (undated) DEVICE — SYRINGE EAR/ULCER STERILE 2 OZ BULB 4172

## (undated) DEVICE — PACK NEURO MINOR UMMC SNE32MNMU4

## (undated) DEVICE — DRSG TELFA 3X8" 1238

## (undated) DEVICE — ESU CORD BIPOLAR AND IRR TUBING AESCULAP US355

## (undated) DEVICE — GLOVE BIOGEL PI MICRO SZ 7.0 48570

## (undated) DEVICE — SUCTION SLEEVE NEPTUNE 2 165MM 0703-005-165

## (undated) DEVICE — LABEL MEDICATION SYSTEM 3303-P

## (undated) DEVICE — ESU GROUND PAD ADULT W/CORD E7507

## (undated) DEVICE — SU ETHILON 4-0 PC-3 18" 1864G

## (undated) DEVICE — SU SILK 4-0 TIE 12X30" A303H

## (undated) DEVICE — ESU HOLSTER PLASTIC DISP E2400

## (undated) DEVICE — SUCTION MANIFOLD NEPTUNE 2 SYS 4 PORT 0702-020-000

## (undated) DEVICE — DRAIN JACKSON PRATT 10MM FLAT 4/4 PERF SU130-1311

## (undated) DEVICE — DRSG TEGADERM 4X4 3/4" 1626W

## (undated) DEVICE — PREP POVIDONE-IODINE 10% SOLUTION 4OZ BOTTLE MDS093944

## (undated) DEVICE — STIMULATOR NERVE NEURO-PULSE SURGICAL LOCATOR 30968-220

## (undated) DEVICE — DRAPE POUCH INSTRUMENT 1018

## (undated) DEVICE — SOL NACL 0.9% IRRIG 1000ML BOTTLE 2F7124

## (undated) RX ORDER — ACETAMINOPHEN 325 MG/1
TABLET ORAL
Status: DISPENSED
Start: 2023-06-08

## (undated) RX ORDER — PROPOFOL 10 MG/ML
INJECTION, EMULSION INTRAVENOUS
Status: DISPENSED
Start: 2023-06-08

## (undated) RX ORDER — OXYCODONE HYDROCHLORIDE 10 MG/1
TABLET ORAL
Status: DISPENSED
Start: 2023-06-08

## (undated) RX ORDER — FENTANYL CITRATE 50 UG/ML
INJECTION, SOLUTION INTRAMUSCULAR; INTRAVENOUS
Status: DISPENSED
Start: 2023-06-08

## (undated) RX ORDER — ONDANSETRON 2 MG/ML
INJECTION INTRAMUSCULAR; INTRAVENOUS
Status: DISPENSED
Start: 2023-06-08

## (undated) RX ORDER — HYDROMORPHONE HCL IN WATER/PF 6 MG/30 ML
PATIENT CONTROLLED ANALGESIA SYRINGE INTRAVENOUS
Status: DISPENSED
Start: 2023-06-08

## (undated) RX ORDER — SODIUM CHLORIDE, SODIUM LACTATE, POTASSIUM CHLORIDE, CALCIUM CHLORIDE 600; 310; 30; 20 MG/100ML; MG/100ML; MG/100ML; MG/100ML
INJECTION, SOLUTION INTRAVENOUS
Status: DISPENSED
Start: 2023-06-08

## (undated) RX ORDER — AMPICILLIN AND SULBACTAM 2; 1 G/1; G/1
INJECTION, POWDER, FOR SOLUTION INTRAMUSCULAR; INTRAVENOUS
Status: DISPENSED
Start: 2023-06-08

## (undated) RX ORDER — DEXAMETHASONE SODIUM PHOSPHATE 4 MG/ML
INJECTION, SOLUTION INTRA-ARTICULAR; INTRALESIONAL; INTRAMUSCULAR; INTRAVENOUS; SOFT TISSUE
Status: DISPENSED
Start: 2023-06-08